# Patient Record
Sex: MALE | Race: WHITE | Employment: OTHER | URBAN - METROPOLITAN AREA
[De-identification: names, ages, dates, MRNs, and addresses within clinical notes are randomized per-mention and may not be internally consistent; named-entity substitution may affect disease eponyms.]

---

## 2017-01-02 ENCOUNTER — APPOINTMENT (OUTPATIENT)
Dept: CARDIAC REHAB | Facility: HOSPITAL | Age: 82
End: 2017-01-02
Attending: INTERNAL MEDICINE

## 2017-01-04 ENCOUNTER — APPOINTMENT (OUTPATIENT)
Dept: CARDIAC REHAB | Facility: HOSPITAL | Age: 82
End: 2017-01-04
Attending: INTERNAL MEDICINE

## 2017-01-06 ENCOUNTER — APPOINTMENT (OUTPATIENT)
Dept: CARDIAC REHAB | Facility: HOSPITAL | Age: 82
End: 2017-01-06
Attending: INTERNAL MEDICINE

## 2017-01-09 ENCOUNTER — APPOINTMENT (OUTPATIENT)
Dept: CARDIAC REHAB | Facility: HOSPITAL | Age: 82
End: 2017-01-09
Attending: INTERNAL MEDICINE

## 2017-01-11 ENCOUNTER — APPOINTMENT (OUTPATIENT)
Dept: CARDIAC REHAB | Facility: HOSPITAL | Age: 82
End: 2017-01-11
Attending: INTERNAL MEDICINE

## 2017-01-13 ENCOUNTER — APPOINTMENT (OUTPATIENT)
Dept: CARDIAC REHAB | Facility: HOSPITAL | Age: 82
End: 2017-01-13
Attending: INTERNAL MEDICINE

## 2017-01-16 ENCOUNTER — APPOINTMENT (OUTPATIENT)
Dept: CARDIAC REHAB | Facility: HOSPITAL | Age: 82
End: 2017-01-16
Attending: INTERNAL MEDICINE

## 2017-01-18 ENCOUNTER — APPOINTMENT (OUTPATIENT)
Dept: CARDIAC REHAB | Facility: HOSPITAL | Age: 82
End: 2017-01-18
Attending: INTERNAL MEDICINE

## 2017-01-20 ENCOUNTER — APPOINTMENT (OUTPATIENT)
Dept: CARDIAC REHAB | Facility: HOSPITAL | Age: 82
End: 2017-01-20
Attending: INTERNAL MEDICINE

## 2017-01-23 ENCOUNTER — APPOINTMENT (OUTPATIENT)
Dept: CARDIAC REHAB | Facility: HOSPITAL | Age: 82
End: 2017-01-23
Attending: INTERNAL MEDICINE

## 2017-01-25 ENCOUNTER — APPOINTMENT (OUTPATIENT)
Dept: CARDIAC REHAB | Facility: HOSPITAL | Age: 82
End: 2017-01-25
Attending: INTERNAL MEDICINE

## 2017-01-26 ENCOUNTER — PRIOR ORIGINAL RECORDS (OUTPATIENT)
Dept: OTHER | Age: 82
End: 2017-01-26

## 2017-01-27 ENCOUNTER — PRIOR ORIGINAL RECORDS (OUTPATIENT)
Dept: OTHER | Age: 82
End: 2017-01-27

## 2017-02-16 ENCOUNTER — MYAURORA ACCOUNT LINK (OUTPATIENT)
Dept: OTHER | Age: 82
End: 2017-02-16

## 2017-02-16 ENCOUNTER — PRIOR ORIGINAL RECORDS (OUTPATIENT)
Dept: OTHER | Age: 82
End: 2017-02-16

## 2017-05-25 ENCOUNTER — PRIOR ORIGINAL RECORDS (OUTPATIENT)
Dept: OTHER | Age: 82
End: 2017-05-25

## 2017-06-07 ENCOUNTER — LAB ENCOUNTER (OUTPATIENT)
Dept: LAB | Age: 82
End: 2017-06-07
Attending: FAMILY MEDICINE
Payer: MEDICARE

## 2017-06-07 ENCOUNTER — PRIOR ORIGINAL RECORDS (OUTPATIENT)
Dept: OTHER | Age: 82
End: 2017-06-07

## 2017-06-07 DIAGNOSIS — I25.10 CORONARY ATHEROSCLEROSIS OF NATIVE CORONARY ARTERY: ICD-10-CM

## 2017-06-07 DIAGNOSIS — I10 ESSENTIAL HYPERTENSION, MALIGNANT: ICD-10-CM

## 2017-06-07 DIAGNOSIS — E78.2 MIXED HYPERLIPIDEMIA: Primary | ICD-10-CM

## 2017-06-07 DIAGNOSIS — R73.01 IMPAIRED FASTING GLUCOSE: ICD-10-CM

## 2017-06-07 PROCEDURE — 36415 COLL VENOUS BLD VENIPUNCTURE: CPT

## 2017-06-07 PROCEDURE — 82043 UR ALBUMIN QUANTITATIVE: CPT

## 2017-06-07 PROCEDURE — 83036 HEMOGLOBIN GLYCOSYLATED A1C: CPT

## 2017-06-07 PROCEDURE — 80053 COMPREHEN METABOLIC PANEL: CPT

## 2017-06-07 PROCEDURE — 82570 ASSAY OF URINE CREATININE: CPT

## 2017-06-07 PROCEDURE — 80061 LIPID PANEL: CPT

## 2017-06-15 ENCOUNTER — MYAURORA ACCOUNT LINK (OUTPATIENT)
Dept: OTHER | Age: 82
End: 2017-06-15

## 2017-06-15 ENCOUNTER — PRIOR ORIGINAL RECORDS (OUTPATIENT)
Dept: OTHER | Age: 82
End: 2017-06-15

## 2017-06-15 LAB
ALT (SGPT): 24 U/L
AST (SGOT): 26 U/L
CHOLESTEROL, TOTAL: 110 MG/DL
HDL CHOLESTEROL: 48 MG/DL
LDL CHOLESTEROL: 53 MG/DL
TRIGLYCERIDES: 47 MG/DL

## 2017-06-20 LAB
ALBUMIN: 3.5 G/DL
ALKALINE PHOSPHATATE(ALK PHOS): 57 IU/L
BILIRUBIN TOTAL: 0.4 MG/DL
BUN: 26 MG/DL
CALCIUM: 9.2 MG/DL
CHLORIDE: 109 MEQ/L
CREATININE, SERUM: 0.87 MG/DL
GLUCOSE: 120 MG/DL
POTASSIUM, SERUM: 4.6 MEQ/L
PROTEIN, TOTAL: 6.9 G/DL
SGOT (AST): 26 IU/L
SGPT (ALT): 24 IU/L
SODIUM: 141 MEQ/L

## 2017-07-10 ENCOUNTER — PRIOR ORIGINAL RECORDS (OUTPATIENT)
Dept: OTHER | Age: 82
End: 2017-07-10

## 2017-07-12 ENCOUNTER — PRIOR ORIGINAL RECORDS (OUTPATIENT)
Dept: OTHER | Age: 82
End: 2017-07-12

## 2017-12-21 ENCOUNTER — PRIOR ORIGINAL RECORDS (OUTPATIENT)
Dept: OTHER | Age: 82
End: 2017-12-21

## 2018-07-19 ENCOUNTER — PRIOR ORIGINAL RECORDS (OUTPATIENT)
Dept: OTHER | Age: 83
End: 2018-07-19

## 2018-07-19 ENCOUNTER — MYAURORA ACCOUNT LINK (OUTPATIENT)
Dept: OTHER | Age: 83
End: 2018-07-19

## 2019-01-01 ENCOUNTER — EXTERNAL RECORD (OUTPATIENT)
Dept: HEALTH INFORMATION MANAGEMENT | Facility: OTHER | Age: 84
End: 2019-01-01

## 2019-02-28 VITALS
HEIGHT: 64 IN | SYSTOLIC BLOOD PRESSURE: 132 MMHG | DIASTOLIC BLOOD PRESSURE: 82 MMHG | BODY MASS INDEX: 26.12 KG/M2 | HEART RATE: 70 BPM | WEIGHT: 153 LBS

## 2019-02-28 VITALS
BODY MASS INDEX: 24.91 KG/M2 | DIASTOLIC BLOOD PRESSURE: 52 MMHG | WEIGHT: 155 LBS | HEART RATE: 72 BPM | SYSTOLIC BLOOD PRESSURE: 128 MMHG | HEIGHT: 66 IN

## 2019-02-28 VITALS
HEIGHT: 65 IN | DIASTOLIC BLOOD PRESSURE: 54 MMHG | HEART RATE: 78 BPM | BODY MASS INDEX: 25.66 KG/M2 | SYSTOLIC BLOOD PRESSURE: 110 MMHG | WEIGHT: 154 LBS

## 2019-03-01 VITALS
HEART RATE: 56 BPM | BODY MASS INDEX: 24.27 KG/M2 | HEIGHT: 66 IN | SYSTOLIC BLOOD PRESSURE: 146 MMHG | DIASTOLIC BLOOD PRESSURE: 82 MMHG | WEIGHT: 151 LBS

## 2019-04-09 RX ORDER — CLOPIDOGREL BISULFATE 75 MG/1
TABLET ORAL
COMMUNITY
Start: 2018-07-27 | End: 2019-07-24 | Stop reason: SDUPTHER

## 2019-04-09 RX ORDER — NITROGLYCERIN 0.4 MG/1
TABLET SUBLINGUAL
COMMUNITY
Start: 2017-02-16 | End: 2019-06-06 | Stop reason: SDUPTHER

## 2019-04-09 RX ORDER — ISOSORBIDE MONONITRATE 30 MG/1
TABLET, EXTENDED RELEASE ORAL
COMMUNITY
Start: 2017-02-16 | End: 2019-12-06 | Stop reason: ALTCHOICE

## 2019-04-09 RX ORDER — RANITIDINE 150 MG/1
TABLET ORAL
COMMUNITY
Start: 2017-12-21 | End: 2019-12-06 | Stop reason: ALTCHOICE

## 2019-04-09 RX ORDER — LISINOPRIL 20 MG/1
TABLET ORAL
COMMUNITY
Start: 2017-11-03 | End: 2019-06-06

## 2019-04-18 LAB
ABSOLUTE IMMATURE GRANULOCYTES (OFFPRE24): NORMAL
ALBUMIN SERPL-MCNC: 2.9 G/DL
ALBUMIN/GLOB SERPL: 0.7 {RATIO}
ALP SERPL-CCNC: 60 U/L
ALT SERPL-CCNC: 14 U/L
ANION GAP SERPL CALC-SCNC: NORMAL MMOL/L
AST SERPL-CCNC: 16 U/L
BASO+EOS+MONOS # BLD: NORMAL 10*3/UL
BASO+EOS+MONOS NFR BLD: NORMAL %
BASOPHILS # BLD: NORMAL 10*3/UL
BASOPHILS NFR BLD: NORMAL %
BILIRUB SERPL-MCNC: 1.3 MG/DL
BUN SERPL-MCNC: 21 MG/DL
BUN/CREAT SERPL: NORMAL
CALCIUM SERPL-MCNC: 8.6 MG/DL
CHLORIDE SERPL-SCNC: 103 MMOL/L
CO2 SERPL-SCNC: 23 MMOL/L
CREAT SERPL-MCNC: 0.77 MG/DL
DIFFERENTIAL METHOD BLD: NORMAL
EOSINOPHIL # BLD: NORMAL 10*3/UL
EOSINOPHIL NFR BLD: NORMAL %
ERYTHROCYTE [DISTWIDTH] IN BLOOD: 14 %
GLOBULIN SER-MCNC: 4.1 G/DL
GLUCOSE SERPL-MCNC: 153 MG/DL
HCT VFR BLD CALC: 46.5 %
HGB BLD-MCNC: 16 G/DL
IMMATURE GRANULOCYTES (OFFPRE25): NORMAL
INR PPP: 1.23 (ref 2–3)
LENGTH OF FAST TIME PATIENT: NORMAL H
LYMPHOCYTES # BLD: NORMAL 10*3/UL
LYMPHOCYTES NFR BLD: NORMAL %
MCH RBC QN AUTO: 30.9 PG
MCHC RBC AUTO-ENTMCNC: 34.4 G/DL
MCV RBC AUTO: 89.8 FL
MONOCYTES # BLD: NORMAL 10*3/UL
MONOCYTES NFR BLD: NORMAL %
MPV (OFFPRE2): 10.6
NEUTROPHILS # BLD: NORMAL 10*3/UL
NEUTROPHILS NFR BLD: NORMAL %
NORMAL CONTROL: ABNORMAL
NRBC BLD MANUAL-RTO: NORMAL %
PATIENT ON COUMADIN Y/N: ABNORMAL
PLAT MORPH BLD: NORMAL
PLATELET # BLD: 229 10*3/UL
POTASSIUM SERPL-SCNC: 3.9 MMOL/L
PROT SERPL-MCNC: 7 G/DL
PROTHROMBIN TIME: 12.6 S
RBC # BLD: 5.18 10*6/UL
RBC MORPH BLD: NORMAL
SODIUM SERPL-SCNC: 133 MMOL/L
WBC # BLD: 24.9 10*3/UL
WBC MORPH BLD: NORMAL

## 2019-04-20 LAB
ABSOLUTE IMMATURE GRANULOCYTES (OFFPRE24): NORMAL
ALBUMIN SERPL-MCNC: NORMAL G/DL
ALBUMIN/GLOB SERPL: NORMAL {RATIO}
ALP SERPL-CCNC: NORMAL U/L
ALT SERPL-CCNC: NORMAL U/L
ANION GAP SERPL CALC-SCNC: NORMAL MMOL/L
AST SERPL-CCNC: NORMAL U/L
BASO+EOS+MONOS # BLD: NORMAL 10*3/UL
BASO+EOS+MONOS NFR BLD: NORMAL %
BASOPHILS # BLD: NORMAL 10*3/UL
BASOPHILS NFR BLD: NORMAL %
BILIRUB SERPL-MCNC: NORMAL MG/DL
BUN SERPL-MCNC: 43 MG/DL
BUN/CREAT SERPL: NORMAL
CALCIUM SERPL-MCNC: 9 MG/DL
CHLORIDE SERPL-SCNC: 101 MMOL/L
CO2 SERPL-SCNC: 25 MMOL/L
CREAT SERPL-MCNC: 0.84 MG/DL
DIFFERENTIAL METHOD BLD: NORMAL
EOSINOPHIL # BLD: NORMAL 10*3/UL
EOSINOPHIL NFR BLD: NORMAL %
ERYTHROCYTE [DISTWIDTH] IN BLOOD: 14.2 %
GLOBULIN SER-MCNC: NORMAL G/DL
GLUCOSE SERPL-MCNC: 115 MG/DL
HCT VFR BLD CALC: 45 %
HGB BLD-MCNC: 15.5 G/DL
IMMATURE GRANULOCYTES (OFFPRE25): NORMAL
LENGTH OF FAST TIME PATIENT: NORMAL H
LYMPHOCYTES # BLD: NORMAL 10*3/UL
LYMPHOCYTES NFR BLD: NORMAL %
MAGNESIUM SERPL-MCNC: 2.6 MG/DL
MCH RBC QN AUTO: 30.7 PG
MCHC RBC AUTO-ENTMCNC: 34.4 G/DL
MCV RBC AUTO: 89.1 FL
MONOCYTES # BLD: NORMAL 10*3/UL
MONOCYTES NFR BLD: NORMAL %
MPV (OFFPRE2): 10.7
NEUTROPHILS # BLD: NORMAL 10*3/UL
NEUTROPHILS NFR BLD: NORMAL %
NRBC BLD MANUAL-RTO: NORMAL %
PLAT MORPH BLD: NORMAL
PLATELET # BLD: 221 10*3/UL
POTASSIUM SERPL-SCNC: 3.7 MMOL/L
PROT SERPL-MCNC: NORMAL G/DL
RBC # BLD: 5.05 10*6/UL
RBC MORPH BLD: NORMAL
SODIUM SERPL-SCNC: 133 MMOL/L
WBC # BLD: 21.5 10*3/UL
WBC MORPH BLD: NORMAL

## 2019-04-21 LAB
ABSOLUTE IMMATURE GRANULOCYTES (OFFPRE24): NORMAL
ALBUMIN SERPL-MCNC: NORMAL G/DL
ALBUMIN/GLOB SERPL: NORMAL {RATIO}
ALP SERPL-CCNC: NORMAL U/L
ALT SERPL-CCNC: NORMAL U/L
ANION GAP SERPL CALC-SCNC: NORMAL MMOL/L
AST SERPL-CCNC: NORMAL U/L
BASO+EOS+MONOS # BLD: NORMAL 10*3/UL
BASO+EOS+MONOS NFR BLD: NORMAL %
BASOPHILS # BLD: NORMAL 10*3/UL
BASOPHILS NFR BLD: NORMAL %
BILIRUB SERPL-MCNC: NORMAL MG/DL
BUN SERPL-MCNC: 38 MG/DL
BUN/CREAT SERPL: NORMAL
CALCIUM SERPL-MCNC: 8.1 MG/DL
CHLORIDE SERPL-SCNC: 107 MMOL/L
CO2 SERPL-SCNC: 24 MMOL/L
CREAT SERPL-MCNC: 0.67 MG/DL
DIFFERENTIAL METHOD BLD: NORMAL
EOSINOPHIL # BLD: NORMAL 10*3/UL
EOSINOPHIL NFR BLD: NORMAL %
ERYTHROCYTE [DISTWIDTH] IN BLOOD: 14.1 %
GLOBULIN SER-MCNC: NORMAL G/DL
GLUCOSE SERPL-MCNC: 110 MG/DL
HCT VFR BLD CALC: 40.1 %
HGB BLD-MCNC: 13.5 G/DL
IMMATURE GRANULOCYTES (OFFPRE25): NORMAL
LENGTH OF FAST TIME PATIENT: NORMAL H
LYMPHOCYTES # BLD: NORMAL 10*3/UL
LYMPHOCYTES NFR BLD: NORMAL %
MAGNESIUM SERPL-MCNC: 2.6 MG/DL
MCH RBC QN AUTO: 30.2 PG
MCHC RBC AUTO-ENTMCNC: 33.7 G/DL
MCV RBC AUTO: 89.7 FL
MONOCYTES # BLD: NORMAL 10*3/UL
MONOCYTES NFR BLD: NORMAL %
MPV (OFFPRE2): 10.1
NEUTROPHILS # BLD: NORMAL 10*3/UL
NEUTROPHILS NFR BLD: NORMAL %
NRBC BLD MANUAL-RTO: NORMAL %
PLAT MORPH BLD: NORMAL
PLATELET # BLD: 223 10*3/UL
POTASSIUM SERPL-SCNC: 3.7 MMOL/L
PROT SERPL-MCNC: NORMAL G/DL
RBC # BLD: 4.47 10*6/UL
RBC MORPH BLD: NORMAL
SODIUM SERPL-SCNC: 137 MMOL/L
WBC # BLD: 13.1 10*3/UL
WBC MORPH BLD: NORMAL

## 2019-04-26 ENCOUNTER — TELEPHONE (OUTPATIENT)
Dept: CARDIOLOGY | Age: 84
End: 2019-04-26

## 2019-06-06 ENCOUNTER — OFFICE VISIT (OUTPATIENT)
Dept: CARDIOLOGY | Age: 84
End: 2019-06-06

## 2019-06-06 VITALS
HEART RATE: 64 BPM | BODY MASS INDEX: 26.58 KG/M2 | DIASTOLIC BLOOD PRESSURE: 72 MMHG | SYSTOLIC BLOOD PRESSURE: 126 MMHG | HEIGHT: 63 IN | WEIGHT: 150 LBS

## 2019-06-06 DIAGNOSIS — I10 BENIGN ESSENTIAL HTN: ICD-10-CM

## 2019-06-06 DIAGNOSIS — Z95.5 S/P DRUG ELUTING CORONARY STENT PLACEMENT: ICD-10-CM

## 2019-06-06 DIAGNOSIS — I25.10 ATHEROSCLEROSIS OF NATIVE CORONARY ARTERY OF NATIVE HEART WITHOUT ANGINA PECTORIS: Primary | ICD-10-CM

## 2019-06-06 PROCEDURE — 99214 OFFICE O/P EST MOD 30 MIN: CPT | Performed by: NURSE PRACTITIONER

## 2019-06-06 RX ORDER — DOXEPIN HYDROCHLORIDE 50 MG/1
1 CAPSULE ORAL
COMMUNITY
End: 2019-06-06 | Stop reason: SDUPTHER

## 2019-06-06 RX ORDER — ROSUVASTATIN CALCIUM 10 MG/1
10 TABLET, COATED ORAL DAILY
COMMUNITY

## 2019-06-06 RX ORDER — LISINOPRIL 10 MG/1
10 TABLET ORAL 2 TIMES DAILY
COMMUNITY
End: 2019-12-06 | Stop reason: ALTCHOICE

## 2019-06-06 RX ORDER — NITROGLYCERIN 0.4 MG/1
0.4 TABLET SUBLINGUAL EVERY 5 MIN PRN
Qty: 25 TABLET | Refills: 1 | Status: SHIPPED | OUTPATIENT
Start: 2019-06-06 | End: 2020-06-26 | Stop reason: SDUPTHER

## 2019-06-06 SDOH — SOCIAL STABILITY: SOCIAL NETWORK: ARE YOU MARRIED, WIDOWED, DIVORCED, SEPARATED, NEVER MARRIED, OR LIVING WITH A PARTNER?: MARRIED

## 2019-06-06 ASSESSMENT — ENCOUNTER SYMPTOMS
ALLERGIC/IMMUNOLOGIC COMMENTS: NO NEW FOOD ALLERGIES
HEMATOCHEZIA: 0
SUSPICIOUS LESIONS: 0
BRUISES/BLEEDS EASILY: 0
HEMOPTYSIS: 0
COUGH: 0
WEIGHT LOSS: 0
FEVER: 0
CHILLS: 0
WEIGHT GAIN: 0

## 2019-06-10 ENCOUNTER — CLINICAL ABSTRACT (OUTPATIENT)
Dept: CARDIOLOGY | Age: 84
End: 2019-06-10

## 2019-07-18 ENCOUNTER — OFFICE VISIT (OUTPATIENT)
Dept: CARDIOLOGY | Age: 84
End: 2019-07-18

## 2019-07-18 VITALS
WEIGHT: 153 LBS | SYSTOLIC BLOOD PRESSURE: 126 MMHG | DIASTOLIC BLOOD PRESSURE: 56 MMHG | HEART RATE: 68 BPM | HEIGHT: 65 IN | BODY MASS INDEX: 25.49 KG/M2

## 2019-07-18 DIAGNOSIS — I10 BENIGN ESSENTIAL HTN: Primary | ICD-10-CM

## 2019-07-18 DIAGNOSIS — I25.10 ATHEROSCLEROSIS OF NATIVE CORONARY ARTERY OF NATIVE HEART WITHOUT ANGINA PECTORIS: ICD-10-CM

## 2019-07-18 DIAGNOSIS — Z95.5 S/P DRUG ELUTING CORONARY STENT PLACEMENT: ICD-10-CM

## 2019-07-18 PROCEDURE — 99214 OFFICE O/P EST MOD 30 MIN: CPT | Performed by: INTERNAL MEDICINE

## 2019-07-18 ASSESSMENT — PATIENT HEALTH QUESTIONNAIRE - PHQ9
SUM OF ALL RESPONSES TO PHQ9 QUESTIONS 1 AND 2: 0
1. LITTLE INTEREST OR PLEASURE IN DOING THINGS: NOT AT ALL
2. FEELING DOWN, DEPRESSED OR HOPELESS: NOT AT ALL
SUM OF ALL RESPONSES TO PHQ9 QUESTIONS 1 AND 2: 0

## 2019-07-24 RX ORDER — CLOPIDOGREL BISULFATE 75 MG/1
TABLET ORAL
Qty: 90 TABLET | Refills: 1 | Status: SHIPPED | OUTPATIENT
Start: 2019-07-24 | End: 2020-01-21 | Stop reason: SDUPTHER

## 2019-08-15 ENCOUNTER — TELEPHONE (OUTPATIENT)
Dept: CARDIOLOGY | Age: 84
End: 2019-08-15

## 2019-08-29 ENCOUNTER — HOSPITAL ENCOUNTER (OUTPATIENT)
Dept: CV DIAGNOSTICS | Age: 84
Discharge: HOME | End: 2019-08-29
Attending: INTERNAL MEDICINE
Payer: MEDICARE

## 2019-08-29 DIAGNOSIS — I25.10 ATHEROSCLEROSIS OF CORONARY ARTERY: ICD-10-CM

## 2019-08-29 DIAGNOSIS — Z95.5 S/P DRUG ELUTING CORONARY STENT PLACEMENT: ICD-10-CM

## 2019-08-29 PROCEDURE — 78452 HT MUSCLE IMAGE SPECT MULT: CPT | Performed by: INTERNAL MEDICINE

## 2019-08-29 PROCEDURE — 93018 CV STRESS TEST I&R ONLY: CPT | Performed by: INTERNAL MEDICINE

## 2019-08-29 PROCEDURE — 93017 CV STRESS TEST TRACING ONLY: CPT | Performed by: INTERNAL MEDICINE

## 2019-09-06 ENCOUNTER — TELEPHONE (OUTPATIENT)
Dept: CARDIOLOGY | Age: 84
End: 2019-09-06

## 2019-12-16 ENCOUNTER — APPOINTMENT (OUTPATIENT)
Dept: CARDIOLOGY | Age: 84
End: 2019-12-16

## 2019-12-16 ENCOUNTER — TELEPHONE (OUTPATIENT)
Dept: CARDIOLOGY | Age: 84
End: 2019-12-16

## 2019-12-18 ENCOUNTER — APPOINTMENT (OUTPATIENT)
Dept: CARDIOLOGY | Age: 84
End: 2019-12-18

## 2019-12-20 ENCOUNTER — OFFICE VISIT (OUTPATIENT)
Dept: CARDIOLOGY | Age: 84
End: 2019-12-20

## 2019-12-20 VITALS
HEIGHT: 65 IN | DIASTOLIC BLOOD PRESSURE: 68 MMHG | BODY MASS INDEX: 25.49 KG/M2 | SYSTOLIC BLOOD PRESSURE: 126 MMHG | HEART RATE: 62 BPM | WEIGHT: 153 LBS

## 2019-12-20 DIAGNOSIS — I10 BENIGN ESSENTIAL HTN: Primary | ICD-10-CM

## 2019-12-20 DIAGNOSIS — I25.10 ATHEROSCLEROSIS OF NATIVE CORONARY ARTERY OF NATIVE HEART WITHOUT ANGINA PECTORIS: ICD-10-CM

## 2019-12-20 DIAGNOSIS — Z95.5 S/P DRUG ELUTING CORONARY STENT PLACEMENT: ICD-10-CM

## 2019-12-20 DIAGNOSIS — I25.10 CORONARY ARTERY DISEASE INVOLVING NATIVE HEART WITHOUT ANGINA PECTORIS, UNSPECIFIED VESSEL OR LESION TYPE: ICD-10-CM

## 2019-12-20 PROCEDURE — 99214 OFFICE O/P EST MOD 30 MIN: CPT | Performed by: INTERNAL MEDICINE

## 2019-12-20 RX ORDER — ISOSORBIDE MONONITRATE 30 MG/1
30 TABLET, EXTENDED RELEASE ORAL
COMMUNITY
Start: 2019-06-12

## 2019-12-20 RX ORDER — LISINOPRIL 10 MG/1
TABLET ORAL
COMMUNITY
Start: 2019-04-23

## 2019-12-20 SDOH — SOCIAL STABILITY: SOCIAL NETWORK: ARE YOU MARRIED, WIDOWED, DIVORCED, SEPARATED, NEVER MARRIED, OR LIVING WITH A PARTNER?: MARRIED

## 2019-12-20 SDOH — HEALTH STABILITY: PHYSICAL HEALTH: ON AVERAGE, HOW MANY DAYS PER WEEK DO YOU ENGAGE IN MODERATE TO STRENUOUS EXERCISE (LIKE A BRISK WALK)?: 0 DAYS

## 2019-12-20 SDOH — HEALTH STABILITY: PHYSICAL HEALTH: ON AVERAGE, HOW MANY MINUTES DO YOU ENGAGE IN EXERCISE AT THIS LEVEL?: 0 MIN

## 2019-12-20 ASSESSMENT — PATIENT HEALTH QUESTIONNAIRE - PHQ9
1. LITTLE INTEREST OR PLEASURE IN DOING THINGS: NOT AT ALL
2. FEELING DOWN, DEPRESSED OR HOPELESS: NOT AT ALL
SUM OF ALL RESPONSES TO PHQ9 QUESTIONS 1 AND 2: 0
SUM OF ALL RESPONSES TO PHQ9 QUESTIONS 1 AND 2: 0

## 2019-12-20 ASSESSMENT — ENCOUNTER SYMPTOMS
WEIGHT LOSS: 0
BRUISES/BLEEDS EASILY: 0
FEVER: 0
HEMATOCHEZIA: 0
CHILLS: 0
COUGH: 0
SUSPICIOUS LESIONS: 0
ALLERGIC/IMMUNOLOGIC COMMENTS: NO NEW FOOD ALLERGIES
HEMOPTYSIS: 0
WEIGHT GAIN: 0

## 2020-01-01 ENCOUNTER — EXTERNAL RECORD (OUTPATIENT)
Dept: HEALTH INFORMATION MANAGEMENT | Facility: OTHER | Age: 85
End: 2020-01-01

## 2020-01-21 ENCOUNTER — TELEPHONE (OUTPATIENT)
Dept: CARDIOLOGY | Age: 85
End: 2020-01-21

## 2020-01-21 RX ORDER — CLOPIDOGREL BISULFATE 75 MG/1
75 TABLET ORAL DAILY
Qty: 90 TABLET | Refills: 3 | Status: SHIPPED | OUTPATIENT
Start: 2020-01-21 | End: 2021-01-15

## 2020-02-17 ENCOUNTER — TELEPHONE (OUTPATIENT)
Dept: CARDIOLOGY | Age: 85
End: 2020-02-17

## 2020-06-09 LAB
ALBUMIN SERPL-MCNC: 4 G/DL
ALBUMIN/GLOB SERPL: 1.4 {RATIO}
ALP SERPL-CCNC: 53 U/L
ALT SERPL-CCNC: 15 UNITS/L
AST SERPL-CCNC: 21 UNITS/L
BILIRUB SERPL-MCNC: 1 MG/DL
BUN SERPL-MCNC: 20 MG/DL
CALCIUM SERPL-MCNC: 9.4 MG/DL
CHLORIDE SERPL-SCNC: 106 MMOL/L
CHOLEST SERPL-MCNC: 130 MG/DL
CHOLEST/HDLC SERPL: 2.8 {RATIO}
CO2 SERPL-SCNC: 24 MMOL/L
CREAT SERPL-MCNC: 0.79 MG/DL
ERYTHROCYTE [DISTWIDTH] IN BLOOD: 13 %
GLOBULIN SER-MCNC: 2.8 G/DL
GLUCOSE SERPL-MCNC: 108 MG/DL
HBA1C MFR BLD: 6 %
HCT VFR BLD CALC: 43.9 %
HDLC SERPL-MCNC: 46 MG/DL
HGB BLD-MCNC: 15.5 G/DL
LDLC SERPL CALC-MCNC: 70 MG/DL
LENGTH OF FAST TIME PATIENT: YES H
LENGTH OF FAST TIME PATIENT: YES H
MCH RBC QN AUTO: 30.8 PG
MCHC RBC AUTO-ENTMCNC: 35.3 G/DL
MCV RBC AUTO: 87.1 FL
MPV (OFFPRE2): 10.7
NONHDLC SERPL-MCNC: 84 MG/DL
PLATELET # BLD: 223 K/MCL
POTASSIUM SERPL-SCNC: 4.4 MMOL/L
PROT SERPL-MCNC: 6.8 G/DL
RBC # BLD: 5.04 10*6/UL
SODIUM SERPL-SCNC: 139 MMOL/L
TRIGL SERPL-MCNC: 63 MG/DL
WBC # BLD: 7.5 K/MCL

## 2020-06-26 ENCOUNTER — V-VISIT (OUTPATIENT)
Dept: CARDIOLOGY | Age: 85
End: 2020-06-26

## 2020-06-26 VITALS
HEIGHT: 65 IN | BODY MASS INDEX: 24.99 KG/M2 | DIASTOLIC BLOOD PRESSURE: 74 MMHG | HEART RATE: 54 BPM | WEIGHT: 150 LBS | SYSTOLIC BLOOD PRESSURE: 131 MMHG

## 2020-06-26 DIAGNOSIS — I25.10 CORONARY ARTERY DISEASE INVOLVING NATIVE HEART WITHOUT ANGINA PECTORIS, UNSPECIFIED VESSEL OR LESION TYPE: ICD-10-CM

## 2020-06-26 DIAGNOSIS — I10 BENIGN ESSENTIAL HTN: Primary | ICD-10-CM

## 2020-06-26 DIAGNOSIS — I25.10 ATHEROSCLEROSIS OF NATIVE CORONARY ARTERY OF NATIVE HEART WITHOUT ANGINA PECTORIS: ICD-10-CM

## 2020-06-26 DIAGNOSIS — Z95.5 S/P DRUG ELUTING CORONARY STENT PLACEMENT: ICD-10-CM

## 2020-06-26 PROCEDURE — 99212 OFFICE O/P EST SF 10 MIN: CPT | Performed by: INTERNAL MEDICINE

## 2020-06-26 RX ORDER — NITROGLYCERIN 0.4 MG/1
0.4 TABLET SUBLINGUAL EVERY 5 MIN PRN
Qty: 25 TABLET | Refills: 1 | Status: SHIPPED | OUTPATIENT
Start: 2020-06-26

## 2020-06-26 SDOH — SOCIAL STABILITY: SOCIAL NETWORK: ARE YOU MARRIED, WIDOWED, DIVORCED, SEPARATED, NEVER MARRIED, OR LIVING WITH A PARTNER?: MARRIED

## 2020-06-26 SDOH — HEALTH STABILITY: PHYSICAL HEALTH: ON AVERAGE, HOW MANY DAYS PER WEEK DO YOU ENGAGE IN MODERATE TO STRENUOUS EXERCISE (LIKE A BRISK WALK)?: 7 DAYS

## 2020-06-26 SDOH — HEALTH STABILITY: PHYSICAL HEALTH: ON AVERAGE, HOW MANY MINUTES DO YOU ENGAGE IN EXERCISE AT THIS LEVEL?: 60 MIN

## 2020-06-26 ASSESSMENT — ENCOUNTER SYMPTOMS
ALLERGIC/IMMUNOLOGIC COMMENTS: NO NEW FOOD ALLERGIES
CHILLS: 0
HEMATOCHEZIA: 0
HEMOPTYSIS: 0
BRUISES/BLEEDS EASILY: 0
WEIGHT GAIN: 0
COUGH: 0
SUSPICIOUS LESIONS: 0
FEVER: 0
WEIGHT LOSS: 0

## 2020-06-26 ASSESSMENT — PATIENT HEALTH QUESTIONNAIRE - PHQ9
SUM OF ALL RESPONSES TO PHQ9 QUESTIONS 1 AND 2: 0
1. LITTLE INTEREST OR PLEASURE IN DOING THINGS: NOT AT ALL
2. FEELING DOWN, DEPRESSED OR HOPELESS: NOT AT ALL
CLINICAL INTERPRETATION OF PHQ2 SCORE: NO FURTHER SCREENING NEEDED
CLINICAL INTERPRETATION OF PHQ9 SCORE: NO FURTHER SCREENING NEEDED
SUM OF ALL RESPONSES TO PHQ9 QUESTIONS 1 AND 2: 0

## 2020-06-29 ENCOUNTER — TELEPHONE (OUTPATIENT)
Dept: CARDIOLOGY | Age: 85
End: 2020-06-29

## 2020-07-06 ENCOUNTER — TELEPHONE (OUTPATIENT)
Dept: CARDIOLOGY | Age: 85
End: 2020-07-06

## 2020-07-06 RX ORDER — PANTOPRAZOLE SODIUM 40 MG/1
40 TABLET, DELAYED RELEASE ORAL 2 TIMES DAILY
COMMUNITY

## 2020-07-07 ENCOUNTER — CLINICAL ABSTRACT (OUTPATIENT)
Dept: CARDIOLOGY | Age: 85
End: 2020-07-07

## 2020-07-21 ENCOUNTER — TELEPHONE (OUTPATIENT)
Dept: CARDIOLOGY | Age: 85
End: 2020-07-21

## 2021-01-15 RX ORDER — CLOPIDOGREL BISULFATE 75 MG/1
75 TABLET ORAL DAILY
Qty: 90 TABLET | Refills: 3 | Status: SHIPPED | OUTPATIENT
Start: 2021-01-15

## 2021-05-26 ENCOUNTER — TELEPHONE (OUTPATIENT)
Dept: CARDIOLOGY | Age: 86
End: 2021-05-26

## 2021-06-02 ENCOUNTER — OFFICE VISIT (OUTPATIENT)
Dept: CARDIOLOGY | Age: 86
End: 2021-06-02

## 2021-06-02 VITALS
DIASTOLIC BLOOD PRESSURE: 78 MMHG | SYSTOLIC BLOOD PRESSURE: 158 MMHG | HEART RATE: 64 BPM | HEIGHT: 65 IN | BODY MASS INDEX: 25.99 KG/M2 | WEIGHT: 156 LBS

## 2021-06-02 DIAGNOSIS — Z01.810 PRE-OPERATIVE CARDIOVASCULAR EXAMINATION: Primary | ICD-10-CM

## 2021-06-02 PROCEDURE — 99213 OFFICE O/P EST LOW 20 MIN: CPT | Performed by: NURSE PRACTITIONER

## 2021-06-02 ASSESSMENT — PATIENT HEALTH QUESTIONNAIRE - PHQ9
CLINICAL INTERPRETATION OF PHQ9 SCORE: NO FURTHER SCREENING NEEDED
SUM OF ALL RESPONSES TO PHQ9 QUESTIONS 1 AND 2: 0
2. FEELING DOWN, DEPRESSED OR HOPELESS: NOT AT ALL
SUM OF ALL RESPONSES TO PHQ9 QUESTIONS 1 AND 2: 0
1. LITTLE INTEREST OR PLEASURE IN DOING THINGS: NOT AT ALL
CLINICAL INTERPRETATION OF PHQ2 SCORE: NO FURTHER SCREENING NEEDED

## 2021-08-09 ENCOUNTER — OFFICE VISIT (OUTPATIENT)
Dept: PODIATRY | Age: 86
End: 2021-08-09

## 2021-08-09 DIAGNOSIS — B35.1 DERMATOPHYTOSIS OF NAIL: ICD-10-CM

## 2021-08-09 DIAGNOSIS — L60.3 NAIL DYSTROPHY: Primary | ICD-10-CM

## 2021-08-09 PROCEDURE — 99203 OFFICE O/P NEW LOW 30 MIN: CPT | Performed by: PODIATRIST

## 2021-10-13 ENCOUNTER — APPOINTMENT (OUTPATIENT)
Dept: URGENT CARE | Age: 86
End: 2021-10-13

## 2021-11-15 ENCOUNTER — OFFICE VISIT (OUTPATIENT)
Dept: PODIATRY | Age: 86
End: 2021-11-15

## 2021-11-15 DIAGNOSIS — L60.8 INCURVATED NAIL: ICD-10-CM

## 2021-11-15 DIAGNOSIS — M79.674 PAIN OF TOE OF RIGHT FOOT: Primary | ICD-10-CM

## 2021-11-15 PROCEDURE — 99212 OFFICE O/P EST SF 10 MIN: CPT | Performed by: PODIATRIST

## 2021-12-27 ENCOUNTER — HOSPITAL ENCOUNTER (EMERGENCY)
Age: 86
Discharge: LEFT BEFORE DISCHARGE | End: 2021-12-27
Payer: MEDICARE

## 2022-06-08 ENCOUNTER — TELEPHONE (OUTPATIENT)
Dept: PODIATRY | Age: 87
End: 2022-06-08

## 2022-06-21 ENCOUNTER — OFFICE VISIT (OUTPATIENT)
Dept: PODIATRY | Age: 87
End: 2022-06-21

## 2022-06-21 DIAGNOSIS — B35.1 DERMATOPHYTOSIS OF NAIL: ICD-10-CM

## 2022-06-21 DIAGNOSIS — M79.675 PAIN IN TOES OF BOTH FEET: ICD-10-CM

## 2022-06-21 DIAGNOSIS — L60.3 NAIL DYSTROPHY: Primary | ICD-10-CM

## 2022-06-21 DIAGNOSIS — M79.674 PAIN IN TOES OF BOTH FEET: ICD-10-CM

## 2022-06-21 DIAGNOSIS — L60.0 INGROWING NAIL: ICD-10-CM

## 2022-09-07 ENCOUNTER — OFFICE VISIT (OUTPATIENT)
Dept: PODIATRY | Age: 87
End: 2022-09-07

## 2022-09-07 DIAGNOSIS — L60.3 NAIL DYSTROPHY: ICD-10-CM

## 2022-09-07 DIAGNOSIS — Z86.718 CURRENT LONG-TERM USE OF ANTICOAGULANT MEDICATION WITH HISTORY OF DEEP VENOUS THROMBOSIS (DVT): ICD-10-CM

## 2022-09-07 DIAGNOSIS — Z79.01 CURRENT LONG-TERM USE OF ANTICOAGULANT MEDICATION WITH HISTORY OF DEEP VENOUS THROMBOSIS (DVT): ICD-10-CM

## 2022-09-07 DIAGNOSIS — B35.1 DERMATOPHYTOSIS OF NAIL: Primary | ICD-10-CM

## 2022-09-07 DIAGNOSIS — M79.674 PAIN IN TOES OF BOTH FEET: ICD-10-CM

## 2022-09-07 DIAGNOSIS — M79.675 PAIN IN TOES OF BOTH FEET: ICD-10-CM

## 2022-11-16 ENCOUNTER — OFFICE VISIT (OUTPATIENT)
Dept: PODIATRY | Age: 87
End: 2022-11-16

## 2022-11-16 DIAGNOSIS — L60.3 NAIL DYSTROPHY: ICD-10-CM

## 2022-11-16 DIAGNOSIS — M79.675 PAIN IN TOES OF BOTH FEET: ICD-10-CM

## 2022-11-16 DIAGNOSIS — B35.1 DERMATOPHYTOSIS OF NAIL: Primary | ICD-10-CM

## 2022-11-16 DIAGNOSIS — M79.674 PAIN IN TOES OF BOTH FEET: ICD-10-CM

## 2023-02-14 ENCOUNTER — OFFICE VISIT (OUTPATIENT)
Dept: PODIATRY | Age: 88
End: 2023-02-14

## 2023-02-14 DIAGNOSIS — L60.3 NAIL DYSTROPHY: ICD-10-CM

## 2023-02-14 DIAGNOSIS — E11.9 TYPE 2 DIABETES MELLITUS WITHOUT COMPLICATION, WITHOUT LONG-TERM CURRENT USE OF INSULIN (CMD): Primary | ICD-10-CM

## 2023-02-14 PROCEDURE — 99213 OFFICE O/P EST LOW 20 MIN: CPT | Performed by: PODIATRIST

## 2023-08-22 ENCOUNTER — APPOINTMENT (OUTPATIENT)
Dept: PODIATRY | Age: 88
End: 2023-08-22

## 2023-12-04 ENCOUNTER — APPOINTMENT (OUTPATIENT)
Dept: GENERAL RADIOLOGY | Facility: HOSPITAL | Age: 88
End: 2023-12-04
Attending: EMERGENCY MEDICINE
Payer: MEDICARE

## 2023-12-04 ENCOUNTER — HOSPITAL ENCOUNTER (EMERGENCY)
Facility: HOSPITAL | Age: 88
Discharge: HOME | End: 2023-12-04
Attending: EMERGENCY MEDICINE
Payer: MEDICARE

## 2023-12-04 ENCOUNTER — APPOINTMENT (OUTPATIENT)
Dept: CT IMAGING | Facility: HOSPITAL | Age: 88
End: 2023-12-04
Attending: EMERGENCY MEDICINE
Payer: MEDICARE

## 2023-12-04 VITALS
HEART RATE: 95 BPM | DIASTOLIC BLOOD PRESSURE: 97 MMHG | TEMPERATURE: 97 F | SYSTOLIC BLOOD PRESSURE: 148 MMHG | RESPIRATION RATE: 18 BRPM | OXYGEN SATURATION: 96 %

## 2023-12-04 DIAGNOSIS — S02.2XXA CLOSED NONDISPLACED FRACTURE OF NASAL BONE, INITIAL ENCOUNTER: ICD-10-CM

## 2023-12-04 DIAGNOSIS — S01.21XA LACERATION OF NOSE, INITIAL ENCOUNTER: ICD-10-CM

## 2023-12-04 DIAGNOSIS — S09.90XA INJURY OF HEAD, INITIAL ENCOUNTER: ICD-10-CM
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
## 2023-12-04 PROCEDURE — 90471 IMMUNIZATION ADMIN: CPT

## 2023-12-04 PROCEDURE — 73030 X-RAY EXAM OF SHOULDER: CPT | Performed by: EMERGENCY MEDICINE

## 2023-12-04 PROCEDURE — 99284 EMERGENCY DEPT VISIT MOD MDM: CPT

## 2023-12-04 PROCEDURE — 70450 CT HEAD/BRAIN W/O DYE: CPT | Performed by: EMERGENCY MEDICINE

## 2023-12-04 PROCEDURE — 70160 X-RAY EXAM OF NASAL BONES: CPT | Performed by: EMERGENCY MEDICINE

## 2023-12-04 PROCEDURE — 99285 EMERGENCY DEPT VISIT HI MDM: CPT

## 2023-12-04 NOTE — ED QUICK NOTES
Report received from Claudio Yan [place]. Assumed care of pt at this time. Pt resting comfortably on the stretcher, denies needs at this time. Family at bedside.

## 2023-12-04 NOTE — ED INITIAL ASSESSMENT (HPI)
Patient received via [facility] for unwitnessed fall while out walking today. Patient denies LOC, but has swelling to nose and small laceration to bridge of nose. Patient is from home with wife. Patient QA&O x 4 upon arrival to ER.

## 2024-01-09 ENCOUNTER — HOSPITAL ENCOUNTER (OUTPATIENT)
Dept: CV DIAGNOSTICS | Age: 89
Discharge: HOME | End: 2024-01-09
Attending: INTERNAL MEDICINE
Payer: MEDICARE

## 2024-01-09 DIAGNOSIS — R55 SYNCOPE AND COLLAPSE: ICD-10-CM

## 2024-01-09 DIAGNOSIS — I10 BENIGN ESSENTIAL HYPERTENSION: ICD-10-CM

## 2024-01-09 PROCEDURE — 93306 TTE W/DOPPLER COMPLETE: CPT | Performed by: INTERNAL MEDICINE

## 2024-02-28 ENCOUNTER — ORDER TRANSCRIPTION (OUTPATIENT)
Dept: PHYSICAL THERAPY | Facility: HOSPITAL | Age: 89
End: 2024-02-28

## 2024-02-28 DIAGNOSIS — M25.511 CHRONIC RIGHT SHOULDER PAIN: Primary | ICD-10-CM

## 2024-02-28 DIAGNOSIS — G89.29 CHRONIC RIGHT SHOULDER PAIN: Primary | ICD-10-CM

## 2024-02-29 ENCOUNTER — TELEPHONE (OUTPATIENT)
Dept: PHYSICAL THERAPY | Facility: HOSPITAL | Age: 89
End: 2024-02-29

## 2024-03-04 ENCOUNTER — OFFICE VISIT (OUTPATIENT)
Facility: LOCATION | Age: 89
End: 2024-03-04
Attending: FAMILY MEDICINE
Payer: MEDICARE

## 2024-03-04 ENCOUNTER — TELEPHONE (OUTPATIENT)
Dept: PHYSICAL THERAPY | Facility: HOSPITAL | Age: 89
End: 2024-03-04

## 2024-03-04 DIAGNOSIS — M25.511 CHRONIC RIGHT SHOULDER PAIN: Primary | ICD-10-CM

## 2024-03-04 DIAGNOSIS — G89.29 CHRONIC RIGHT SHOULDER PAIN: Primary | ICD-10-CM

## 2024-03-04 PROCEDURE — 97110 THERAPEUTIC EXERCISES: CPT

## 2024-03-04 PROCEDURE — 97162 PT EVAL MOD COMPLEX 30 MIN: CPT

## 2024-03-04 NOTE — PROGRESS NOTES
INITIAL EVALUATION:   Referring Physician: Dr. Mills  Diagnosis: Chronic right shoulder pain (M25.511,G89.29)     Date of Service: 3/4/2024     PATIENT SUMMARY/ASSESSMENT   Beba Mijares is a 89 year old y/o male who presents to therapy today with complaints of right shoulder pain and decreased ROM. The patient reports initial onset of symptoms several months ago with no known mechanism of injury. HE reports his shoulder pain has progressively worsened and he had x-rays which showed some arthritis and possible rotator cuff injury    Pain: Present 5/10  Best 3/10  Worst 7/10  The patient reports pain in the right shoulder/upper arm. He reports his symptoms are aggravated with using the right arm more. He reports his symptoms are eased with resting    Beba Mijares presents with impaired joint mobility, motor function, muscle performance, and range of motion associated with right shoulder pain  There are 1 personal factors and co-morbidities influencing plan of care, including chronic nature of symptoms, making treatment more difficult  There are 3 or more Body Structures/Functions, Activity Limitations and Participation Restrictions, including poor posture, decreased shoulder AROM, decreased shoulder strength  Clinical Presentation: Evolving   Beba describes prior level of function as independent with ADLs and self-care tasks without significant limitations or complaints of shoulder pain. Pt goals include decreasing his complaints of shoulder pain.  Past medical history was reviewed with Beba. Significant findings include  has a past medical history of Atherosclerosis of coronary artery (9/21/16 ), BLOOD CLOTS, Colitis, Deaf, Heart attack (HCC) (9/18/16), High blood pressure, High cholesterol, Kidney stone, Pulmonary embolism (HCC) (2013), and Stroke (HCC).  Beba would benefit from skilled Physical Therapy to address the above impairments to decrease complaints of pain and facilitate return to previous level of  function    Precautions:  None  OBJECTIVE:   Observation/Posture: The patient sits with poor posture with a slightly forward head and rounded shoulders    ROM: Seated AROM right shoulder flexion 80 degrees, Abduction 70 degrees with scapular elevation, Functional IR to L5, Functional ER to ear.   Supine PROM right shoulder flexion 120 degrees, ER 70 degrees at 90 degrees of abduction, IR 50 degrees at 90 degrees of abduction    Accessory motion: Hypomobility with GH joint AP/inferior glides    Flexibility: Moderate limitations in bilateral pectorals    Special tests: Drop Arm test painful    QuickDASH Outcome Score  Score: 38.64 % (3/4/2024  2:17 PM)       Today’s Treatment and Response: Reviewed plan of care and role of physical therapy. Performed supine PROM right shoulder as tolerated, Seated OH pulleys, supine wand shoulder flexion and provided handout for HEP and recommended the patient obtain shoulder pulleys for home    Charges: PT Eval Moderate complexity x1, TherEx x 1      Total Timed Treatment: 10 min     Total Treatment Time: 40 min     PLAN OF CARE:    Goals:  (To be met in 12 visits)  Patient to demonstrate independence and compliance with comprehensive home exercise program   Patient to demonstrate improved postural awareness, requiring no cueing during treatment session   Patient to improve QuickDash score to be better than 15%  Patient to demonstrate improved right shoulder AROM to be at least 120 degrees of flexion to facilitate performance of overhead tasks  Patient to report decreased complaints of pain at its worst to be less than or equal to 3/10 to facilitate performance of ADLs and self-care tasks    Frequency / Duration: Patient will be seen for 2 x/week or a total of 12 visits over a 90 day period. Treatment will include: Manual Therapy; Therapeutic Exercises; Neuromuscular Re-education; Therapeutic Activity; Electrical Stim; Ultrasound;     Education or treatment limitation: None  Rehab  Potential:fair      Patient/Family/Caregiver was advised of these findings, precautions, and treatment options and has agreed to actively participate in planning and for this course of care.    Thank you for your referral. Please co-sign or sign and return this letter via fax as soon as possible to [phone]. If you have any questions, please contact me at Dept: [phone]    Sincerely,  Electronically signed by therapist: Jose Juan Lee, PT    Physician's certification required: Yes  I certify the need for these services furnished under this plan of treatment and while under my care.    X___________________________________________________ Date____________________    Certification From: 3/4/2024  To:6/2/2024

## 2024-03-06 ENCOUNTER — OFFICE VISIT (OUTPATIENT)
Facility: LOCATION | Age: 89
End: 2024-03-06
Attending: FAMILY MEDICINE
Payer: MEDICARE

## 2024-03-06 PROCEDURE — 97140 MANUAL THERAPY 1/> REGIONS: CPT

## 2024-03-06 PROCEDURE — 97110 THERAPEUTIC EXERCISES: CPT

## 2024-03-06 NOTE — PROGRESS NOTES
Dx: Chronic right shoulder pain (M25.511,G89.29)           Authorized # of Visits:  12  Fall Risk: standard         Precautions: n/a             Subjective:   The patient reports he has been working on the exercises at home and ordered pulleys  Current Pain Ratin/10  Objective:   See flow sheet    Assessment:   The patient tolerated treatment well with no significantly increased complaints of pain. Continued limitations with active shoulder ROM    Plan:   Continue PT to address soft tissue and joint restrictions and provide instruction in a progressive therapeutic exercise program with manual and verbal cueing for proper form and technique    Date: 3/6/2024  Tx#:  Date:   Tx#: 3/ Date:   Tx#: 4/ Date:   Tx#: 5/ Date:   Tx#: 6/ Date:   Tx#: 7/ Date:   Tx#: 8/   TherEx (30 min) TherEx TherEx TherEx TherEx TherEx TherEx   Supine PROM right shoulder as tolerated          Seated OH pulleys shoulder flexion and horizontal abd with ER         UBE x 6 min         Supine wand shoulder flexion and chest press 2 x 10 each         Reviewed HEP         Manual Therapy (8 min)         GH joint AP/inferior glides grade 3         STM/TPR right scapular musculature         -         HEP: Seated OH pulleys, Supine wand shoulder flexion    Charges: TherEx x 2; MT x 1       Total Timed Treatment: 38 min  Total Treatment Time: 38 min

## 2024-03-12 ENCOUNTER — OFFICE VISIT (OUTPATIENT)
Facility: LOCATION | Age: 89
End: 2024-03-12
Attending: FAMILY MEDICINE
Payer: MEDICARE

## 2024-03-12 PROCEDURE — 97110 THERAPEUTIC EXERCISES: CPT

## 2024-03-12 PROCEDURE — 97140 MANUAL THERAPY 1/> REGIONS: CPT

## 2024-03-12 NOTE — PROGRESS NOTES
Dx: Chronic right shoulder pain (M25.511,G89.29)           Authorized # of Visits:  12  Fall Risk: standard         Precautions: n/a             Subjective:   The patient reports his shoulder is feeling a little bit better with less pain  Current Pain Rating: 3/10  Objective:   See flow sheet  PROM: Supine PROM shoulder flexion 140 degrees, Shoulder ER 80 degrees at 90 degrees of abduction, Shoulder IR 60 degrees at 90 degrees of abduction    Assessment:   The patient demonstrated significantly improved shoulder PROM, but continues to have difficulty with shoulder AROM into flexion    Goals:  (To be met in 12 visits)  Patient to demonstrate independence and compliance with comprehensive home exercise program   Patient to demonstrate improved postural awareness, requiring no cueing during treatment session   Patient to improve QuickDash score to be better than 15%  Patient to demonstrate improved right shoulder AROM to be at least 120 degrees of flexion to facilitate performance of overhead tasks  Patient to report decreased complaints of pain at its worst to be less than or equal to 3/10 to facilitate performance of ADLs and self-care tasks    Plan:   Continue PT to address soft tissue and joint restrictions and provide instruction in a progressive therapeutic exercise program with manual and verbal cueing for proper form and technique    Date: 3/6/2024  Tx#: 2/12 Date:   3/12/2024  Tx#: 3/12 Date:   Tx#: 4/ Date:   Tx#: 5/ Date:   Tx#: 6/ Date:   Tx#: 7/ Date:   Tx#: 8/   TherEx (30 min) TherEx (30 min) TherEx TherEx TherEx TherEx TherEx   Supine PROM right shoulder as tolerated  Supine PROM right shoulder as tolerated        Seated OH pulleys shoulder flexion and horizontal abd with ER Seated OH pulleys shoulder flexion and horizontal abd with ER        UBE x 6 min Seated AAROM shoulder flexion x 10        Supine wand shoulder flexion and chest press 2 x 10 each Supine wand shoulder flexion and chest press 2 x 10  each        Reviewed HEP UBE x 6 min        Manual Therapy (8 min) Reviewed HEP        GH joint AP/inferior glides grade 3 Manual Therapy (10 min)        STM/TPR right scapular musculature GH joint AP/inferior glides grade 3        - STM/TPR right scapular musculature        HEP: Seated OH pulleys, Supine wand shoulder flexion    Charges: TherEx x 2; MT x 1       Total Timed Treatment: 40 min  Total Treatment Time: 40 min

## 2024-03-19 ENCOUNTER — OFFICE VISIT (OUTPATIENT)
Facility: LOCATION | Age: 89
End: 2024-03-19
Attending: FAMILY MEDICINE
Payer: MEDICARE

## 2024-03-19 PROCEDURE — 97110 THERAPEUTIC EXERCISES: CPT

## 2024-03-19 PROCEDURE — 97140 MANUAL THERAPY 1/> REGIONS: CPT

## 2024-03-19 NOTE — PROGRESS NOTES
Dx: Chronic right shoulder pain (M25.511,G89.29)           Authorized # of Visits:  12  Fall Risk: standard         Precautions: n/a             Subjective:   The patient reports he feels like his shoulder is a little better with less pain  Current Pain Ratin/10  Objective:   See flow sheet  PROM: Supine PROM shoulder flexion 140 degrees, Shoulder ER 80 degrees at 90 degrees of abduction, Shoulder IR 60 degrees at 90 degrees of abduction    Assessment:   The patient tolerated treatment well, with improvements in PROM, but significant limitations and continued pain with active shoulder movements    Goals:  (To be met in 12 visits)  Patient to demonstrate independence and compliance with comprehensive home exercise program   Patient to demonstrate improved postural awareness, requiring no cueing during treatment session   Patient to improve QuickDash score to be better than 15%  Patient to demonstrate improved right shoulder AROM to be at least 120 degrees of flexion to facilitate performance of overhead tasks  Patient to report decreased complaints of pain at its worst to be less than or equal to 3/10 to facilitate performance of ADLs and self-care tasks    Plan:   Continue PT to address soft tissue and joint restrictions and provide instruction in a progressive therapeutic exercise program with manual and verbal cueing for proper form and technique    Date: 3/6/2024  Tx#:  Date:   3/12/2024  Tx#: 3/12 Date:  3/19/2024   Tx#:  Date:   Tx#: 5/ Date:   Tx#: / Date:   Tx#: 7/ Date:   Tx#: 8/   TherEx (30 min) TherEx (30 min) TherEx (30 min) TherEx TherEx TherEx TherEx   Supine PROM right shoulder as tolerated  Supine PROM right shoulder as tolerated  UBE x 6 min       Seated OH pulleys shoulder flexion and horizontal abd with ER Seated OH pulleys shoulder flexion and horizontal abd with ER Seated OH pulleys shoulder flexion and horizontal abd with ER       UBE x 6 min Seated AAROM shoulder flexion x 10  Supine PROM right shoulder as tolerated       Supine wand shoulder flexion and chest press 2 x 10 each Supine wand shoulder flexion and chest press 2 x 10 each S/L shoulder ER 2 x 10       Reviewed HEP UBE x 6 min S/L shoulder abduction 2 x 10       Manual Therapy (8 min) Reviewed HEP Seated AAROM shoulder flexion with wand x 10       GH joint AP/inferior glides grade 3 Manual Therapy (10 min) Manual Therapy (10 min)       STM/TPR right scapular musculature GH joint AP/inferior glides grade 3 GH joint AP/inferior glides grade 3       - STM/TPR right scapular musculature STM/TPR right UT, LS and infraspinatus       HEP: Seated OH pulleys, Supine wand shoulder flexion    Charges: TherEx x 2; MT x 1       Total Timed Treatment: 40 min  Total Treatment Time: 40 min

## 2024-03-26 ENCOUNTER — APPOINTMENT (OUTPATIENT)
Facility: LOCATION | Age: 89
End: 2024-03-26
Attending: FAMILY MEDICINE
Payer: MEDICARE

## 2024-03-26 ENCOUNTER — TELEPHONE (OUTPATIENT)
Dept: PHYSICAL THERAPY | Facility: HOSPITAL | Age: 89
End: 2024-03-26

## 2024-03-26 NOTE — PROGRESS NOTES
Dx: Chronic right shoulder pain (M25.511,G89.29)           Authorized # of Visits:  12  Fall Risk: standard         Precautions: n/a             Subjective:   ***  Current Pain Ratin/10    Objective:   See flow sheet      Assessment:   ***    The patient tolerated treatment well, with improvements in PROM, but significant limitations and continued pain with active shoulder movements    Goals:  (To be met in 12 visits)  Patient to demonstrate independence and compliance with comprehensive home exercise program   Patient to demonstrate improved postural awareness, requiring no cueing during treatment session   Patient to improve QuickDash score to be better than 15%  Patient to demonstrate improved right shoulder AROM to be at least 120 degrees of flexion to facilitate performance of overhead tasks  Patient to report decreased complaints of pain at its worst to be less than or equal to 3/10 to facilitate performance of ADLs and self-care tasks    Plan:   Continue PT to address soft tissue and joint restrictions and provide instruction in a progressive therapeutic exercise program with manual and verbal cueing for proper form and technique    Date: 3/6/2024  Tx#:  Date:   3/12/2024  Tx#: 3/12 Date:  3/19/2024   Tx#:  Date: 3/26/24  Tx#: 5/ Date:   Tx#: 6/ Date:   Tx#: 7/ Date:   Tx#: 8/   TherEx (30 min) TherEx (30 min) TherEx (30 min)  TherEx TherEx TherEx   Supine PROM right shoulder as tolerated  Supine PROM right shoulder as tolerated  UBE x 6 min       Seated OH pulleys shoulder flexion and horizontal abd with ER Seated OH pulleys shoulder flexion and horizontal abd with ER Seated OH pulleys shoulder flexion and horizontal abd with ER       UBE x 6 min Seated AAROM shoulder flexion x 10 Supine PROM right shoulder as tolerated       Supine wand shoulder flexion and chest press 2 x 10 each Supine wand shoulder flexion and chest press 2 x 10 each S/L shoulder ER 2 x 10       Reviewed HEP UBE x 6 min S/L  shoulder abduction 2 x 10       Manual Therapy (8 min) Reviewed HEP Seated AAROM shoulder flexion with wand x 10       GH joint AP/inferior glides grade 3 Manual Therapy (10 min) Manual Therapy (10 min)       STM/TPR right scapular musculature GH joint AP/inferior glides grade 3 GH joint AP/inferior glides grade 3       - STM/TPR right scapular musculature STM/TPR right UT, LS and infraspinatus       HEP: Seated OH pulleys, Supine wand shoulder flexion    Charges: TherEx x ***; MT x ***       Total Timed Treatment: *** min  Total Treatment Time: *** min

## 2024-03-29 ENCOUNTER — APPOINTMENT (OUTPATIENT)
Dept: CT IMAGING | Age: 89
End: 2024-03-29
Attending: EMERGENCY MEDICINE
Payer: MEDICARE

## 2024-03-29 ENCOUNTER — HOSPITAL ENCOUNTER (EMERGENCY)
Age: 89
Discharge: HOME | End: 2024-03-29
Attending: EMERGENCY MEDICINE
Payer: MEDICARE

## 2024-03-29 VITALS
DIASTOLIC BLOOD PRESSURE: 62 MMHG | HEIGHT: 66 IN | OXYGEN SATURATION: 95 % | RESPIRATION RATE: 16 BRPM | WEIGHT: 140 LBS | SYSTOLIC BLOOD PRESSURE: 113 MMHG | BODY MASS INDEX: 22.5 KG/M2 | TEMPERATURE: 99 F | HEART RATE: 79 BPM

## 2024-03-29 DIAGNOSIS — S01.01XA LACERATION OF SCALP, INITIAL ENCOUNTER: Primary | ICD-10-CM

## 2024-03-29 DIAGNOSIS — S09.90XA CLOSED HEAD INJURY, INITIAL ENCOUNTER: ICD-10-CM

## 2024-03-29 LAB
ALBUMIN SERPL-MCNC: 3.5 G/DL (ref 3.4–5)
ALBUMIN/GLOB SERPL: 0.9 {RATIO} (ref 1–2)
ALP LIVER SERPL-CCNC: 54 U/L
ALT SERPL-CCNC: 23 U/L
ANION GAP SERPL CALC-SCNC: 8 MMOL/L (ref 0–18)
AST SERPL-CCNC: 20 U/L (ref 15–37)
BASOPHILS # BLD AUTO: 0.05 X10(3) UL (ref 0–0.2)
BASOPHILS NFR BLD AUTO: 0.3 %
BILIRUB SERPL-MCNC: 0.5 MG/DL (ref 0.1–2)
BILIRUB UR QL STRIP.AUTO: NEGATIVE
BUN BLD-MCNC: 32 MG/DL (ref 9–23)
CALCIUM BLD-MCNC: 8.9 MG/DL (ref 8.5–10.1)
CHLORIDE SERPL-SCNC: 107 MMOL/L (ref 98–112)
CLARITY UR REFRACT.AUTO: CLEAR
CO2 SERPL-SCNC: 24 MMOL/L (ref 21–32)
COLOR UR AUTO: YELLOW
CREAT BLD-MCNC: 0.99 MG/DL
EGFRCR SERPLBLD CKD-EPI 2021: 69 ML/MIN/1.73M2 (ref 60–?)
EOSINOPHIL # BLD AUTO: 0.04 X10(3) UL (ref 0–0.7)
EOSINOPHIL NFR BLD AUTO: 0.2 %
ERYTHROCYTE [DISTWIDTH] IN BLOOD BY AUTOMATED COUNT: 14 %
GLOBULIN PLAS-MCNC: 3.7 G/DL (ref 2.8–4.4)
GLUCOSE BLD-MCNC: 175 MG/DL (ref 70–99)
GLUCOSE BLD-MCNC: 198 MG/DL (ref 70–99)
GLUCOSE UR STRIP.AUTO-MCNC: NEGATIVE MG/DL
HCT VFR BLD AUTO: 43.9 %
HGB BLD-MCNC: 14.9 G/DL
IMM GRANULOCYTES # BLD AUTO: 0.09 X10(3) UL (ref 0–1)
IMM GRANULOCYTES NFR BLD: 0.6 %
KETONES UR STRIP.AUTO-MCNC: 15 MG/DL
LEUKOCYTE ESTERASE UR QL STRIP.AUTO: NEGATIVE
LYMPHOCYTES # BLD AUTO: 0.7 X10(3) UL (ref 1–4)
LYMPHOCYTES NFR BLD AUTO: 4.3 %
MCH RBC QN AUTO: 30.8 PG (ref 26–34)
MCHC RBC AUTO-ENTMCNC: 33.9 G/DL (ref 31–37)
MCV RBC AUTO: 90.7 FL
MONOCYTES # BLD AUTO: 0.88 X10(3) UL (ref 0.1–1)
MONOCYTES NFR BLD AUTO: 5.4 %
NEUTROPHILS # BLD AUTO: 14.51 X10 (3) UL (ref 1.5–7.7)
NEUTROPHILS # BLD AUTO: 14.51 X10(3) UL (ref 1.5–7.7)
NEUTROPHILS NFR BLD AUTO: 89.2 %
NITRITE UR QL STRIP.AUTO: NEGATIVE
OSMOLALITY SERPL CALC.SUM OF ELEC: 300 MOSM/KG (ref 275–295)
PH UR STRIP.AUTO: 7 [PH] (ref 5–8)
PLATELET # BLD AUTO: 172 10(3)UL (ref 150–450)
POTASSIUM SERPL-SCNC: 3.8 MMOL/L (ref 3.5–5.1)
PROT SERPL-MCNC: 7.2 G/DL (ref 6.4–8.2)
RBC # BLD AUTO: 4.84 X10(6)UL
SODIUM SERPL-SCNC: 139 MMOL/L (ref 136–145)
SP GR UR STRIP.AUTO: 1.02 (ref 1–1.03)
UROBILINOGEN UR STRIP.AUTO-MCNC: 0.2 MG/DL
WBC # BLD AUTO: 16.3 X10(3) UL (ref 4–11)

## 2024-03-29 PROCEDURE — 12002 RPR S/N/AX/GEN/TRNK2.6-7.5CM: CPT

## 2024-03-29 PROCEDURE — 81001 URINALYSIS AUTO W/SCOPE: CPT | Performed by: EMERGENCY MEDICINE

## 2024-03-29 PROCEDURE — 96360 HYDRATION IV INFUSION INIT: CPT

## 2024-03-29 PROCEDURE — 72125 CT NECK SPINE W/O DYE: CPT | Performed by: EMERGENCY MEDICINE

## 2024-03-29 PROCEDURE — 82962 GLUCOSE BLOOD TEST: CPT

## 2024-03-29 PROCEDURE — 81015 MICROSCOPIC EXAM OF URINE: CPT | Performed by: EMERGENCY MEDICINE

## 2024-03-29 PROCEDURE — 70450 CT HEAD/BRAIN W/O DYE: CPT | Performed by: EMERGENCY MEDICINE

## 2024-03-29 PROCEDURE — 99285 EMERGENCY DEPT VISIT HI MDM: CPT

## 2024-03-29 PROCEDURE — 85025 COMPLETE CBC W/AUTO DIFF WBC: CPT | Performed by: EMERGENCY MEDICINE

## 2024-03-29 PROCEDURE — 93005 ELECTROCARDIOGRAM TRACING: CPT

## 2024-03-29 PROCEDURE — 93010 ELECTROCARDIOGRAM REPORT: CPT

## 2024-03-29 PROCEDURE — 80053 COMPREHEN METABOLIC PANEL: CPT | Performed by: EMERGENCY MEDICINE

## 2024-03-29 RX ORDER — OLOPATADINE HYDROCHLORIDE 1 MG/ML
2 SOLUTION/ DROPS OPHTHALMIC 2 TIMES DAILY
COMMUNITY
Start: 2022-06-20

## 2024-03-29 RX ORDER — NITROGLYCERIN 0.4 MG/1
TABLET SUBLINGUAL
COMMUNITY
Start: 2021-08-03

## 2024-03-29 NOTE — ED PROVIDER NOTES
Patient Seen in: [facility]      History     Chief Complaint   Patient presents with    Fall     Stated Complaint: UNWITNESSED FALL ON DRIVEWAY. DRIVING NURSE NOTICED HIM LAYING ON THE GROUND 30*    Subjective:   HPI    89-year-old male here for fall patient had unwitnessed fall in his driveway neighbor found him no report that he was unconscious but has not quite clear why he fell whether he lost consciousness or fainted or how long he was down for.  Patient has some pain and swelling in the back of his head but denies any neck pain or extremity injury.    Objective:   Past Medical History:   Diagnosis Date    Atherosclerosis of coronary artery 9/21/16     Stents 3    BLOOD CLOTS     Colitis     Deaf     Heart attack (HCC) 9/18/16    NSTEMI    High blood pressure     High cholesterol     Kidney stone     Pulmonary embolism (HCC) 2013    DVT & PE POst  Fractured Metatarsil    Stroke (HCC)               Past Surgical History:   Procedure Laterality Date    ANGIOGRAM      ANGIOPLASTY (CORONARY)  9/21/16      Stents LAD, CIRC, RCA    CATH CARTOTID STENT      CATH DRUG ELUTING STENT      PROSTATE BIOPSIES                  Social History     Socioeconomic History    Marital status:    Tobacco Use    Smoking status: Never   Substance and Sexual Activity    Alcohol use: Yes     Comment: A BEER 2X/WEEK    Drug use: No              Review of Systems    Positive for stated complaint: UNWITNESSED FALL ON DRIVEWAY. DRIVING NURSE NOTICED HIM LAYING ON THE GROUND 30*  Other systems are as noted in HPI.  Constitutional and vital signs reviewed.      All other systems reviewed and negative except as noted above.    Physical Exam     ED Triage Vitals   BP 03/29/24 1138 (!) 194/76   Pulse 03/29/24 1138 108   Resp 03/29/24 1138 18   Temp 03/29/24 1138 98.9 °F (37.2 °C)   Temp src 03/29/24 1716 Temporal   SpO2 03/29/24 1138 93 %   O2 Device 03/29/24 1138 None (Room air)       Current:/62    Pulse 79   Temp 98.6 °F (37 °C) (Temporal)   Resp 16   Ht 167.6 cm (5' 6\")   Wt 63.5 kg   SpO2 95%   BMI 22.60 kg/m²         Physical Exam    The patient is alert and oriented x 2 no acute distress HEENT exam pupils are equal round react to light extract muscles are intact on the occipital area there is an area of skin tear about 4 cm across and underlying this there is a laceration about 3 cm that is into the subcutaneous tissue no bony deformities noted.  The neck is nontender there is no JVD lungs are clear cardiovascular exam shows regular rate and rhythm without murmurs abdomen soft nontender extremities are atraumatic with full range of motion  Neuro exam the mental status is mildly confused as per dates cranial nerves II through XII are normal motor functions intact sensations intact cerebellar finger-nose is normal.    ED Course     Labs Reviewed   COMP METABOLIC PANEL (14) - Abnormal; Notable for the following components:       Result Value    Glucose 198 (*)     BUN 32 (*)     Calculated Osmolality 300 (*)     A/G Ratio 0.9 (*)     All other components within normal limits   URINALYSIS WITH CULTURE REFLEX - Abnormal; Notable for the following components:    Ketones Urine 15 (*)     Blood Urine Moderate (*)     Protein Urine 30 mg/dL (*)     All other components within normal limits   UA MICROSCOPIC ONLY, URINE - Abnormal; Notable for the following components:    RBC Urine 3-5 (*)     All other components within normal limits   POCT GLUCOSE - Abnormal; Notable for the following components:    POC Glucose 175 (*)     All other components within normal limits   CBC W/ DIFFERENTIAL - Abnormal; Notable for the following components:    WBC 16.3 (*)     Neutrophil Absolute Prelim 14.51 (*)     Neutrophil Absolute 14.51 (*)     Lymphocyte Absolute 0.70 (*)     All other components within normal limits   CBC WITH DIFFERENTIAL WITH PLATELET    Narrative:     The following orders were created for panel order  CBC With Differential With Platelet.  Procedure                               Abnormality         Status                     ---------                               -----------         ------                     CBC W/ DIFFERENTIAL[[id number]]          Abnormal            Final result                 Please view results for these tests on the individual orders.   RAINBOW DRAW LAVENDER   RAINBOW DRAW LIGHT GREEN   RAINBOW DRAW BLUE     EKG    Rate, intervals and axes as noted on EKG Report.  Rate: 102  Rhythm: Sinus Rhythm  Reading: Sinus tachycardia occasional PVCs left axis deviation inferior infarct age undetermined this is an abnormal EKG         BUN 32 white blood Cygan 16.3        CT SPINE CERVICAL (CPT=72125)    Result Date: 3/29/2024  CONCLUSION:  No acute cervical spine fracture.  Diffuse spondylosis    LOCATION:  [id number]   Dictated by (CST): Nicholas Lemus MD on 3/29/2024 at 1:06 PM     Finalized by (CST): Nicholas Lemus MD on 3/29/2024 at 1:08 PM       CT BRAIN OR HEAD ([id number])    Result Date: 3/29/2024  CONCLUSION: 1. No acute intracranial findings 2. Cerebral atrophy with chronic microvascular ischemic changes.  Encephalomalacia involving the left frontal and parietal lobes, stable from prior.    LOCATION:  [id number]   Dictated by (CST): Nicholas Lemus MD on 3/29/2024 at 1:04 PM     Finalized by (CST): Nicholas Lemus MD on 3/29/2024 at 1:06 PM      Images independent reviewed there is no intracranial hemorrhage there is diffuse spondylosis but no fracture.         MDM      Initial differential diagnosis includes scalp laceration contusion concussion intracranial hemorrhage skull fracture cervical spine injury cervical spine fracture contusion extremity injury syncope.    Patient's wound was reexamined there was skin avulsion which was removed and then there was a stellate laceration total length about 6 cm a total of 8 staples were placed after local anesthesia with 1% lidocaine without epinephrine the wound  was scrubbed and irrigated prior to stapling.  A dressing was applied.  The patient was given a liter of IV fluids.  I discussed with the son the possibility of a syncopal episode but the patient and the son think this was more of a mechanical fall and offered admission but declines this.    UA was negative                           Medical Decision Making      Disposition and Plan     Clinical Impression:  1. Laceration of scalp, initial encounter    2. Closed head injury, initial encounter         Disposition:  Discharge  3/29/2024  6:00 pm    Follow-up:  Ashish Mills  2020 [address] [address]  [phone]    Follow up            Medications Prescribed:  Discharge Medication List as of 3/29/2024  6:02 PM

## 2024-03-29 NOTE — DISCHARGE INSTRUCTIONS
Follow-up with your primary care doctor or return to the ER in 10 days to 2 weeks for staple removal.  Clean wound daily with soap and water apply Neosporin ointment cover with a bandage.

## 2024-03-29 NOTE — ED QUICK NOTES
Pt ambulated to the bathroom with steady gait. Pt tried to void in the urinal bottle but was unable to collect his urine in it. Nurse was notified.

## 2024-03-29 NOTE — ED INITIAL ASSESSMENT (HPI)
Pt fell in driveway this AM. Fall was unwitnessed and patient does not remember falling. Unknown LOC, found by neighbor laying in driveway. Laceration and abrasion noted to back of head. Denies any pain. Pt is on blood thinners

## 2024-03-31 LAB
ATRIAL RATE: 102 BPM
P AXIS: -10 DEGREES
P-R INTERVAL: 136 MS
Q-T INTERVAL: 340 MS
QRS DURATION: 88 MS
QTC CALCULATION (BEZET): 443 MS
R AXIS: -41 DEGREES
T AXIS: 16 DEGREES
VENTRICULAR RATE: 102 BPM

## 2024-04-02 ENCOUNTER — APPOINTMENT (OUTPATIENT)
Facility: LOCATION | Age: 89
End: 2024-04-02
Attending: FAMILY MEDICINE
Payer: MEDICARE

## 2024-04-02 ENCOUNTER — TELEPHONE (OUTPATIENT)
Dept: PHYSICAL THERAPY | Facility: HOSPITAL | Age: 89
End: 2024-04-02

## 2024-04-09 ENCOUNTER — HOSPITAL ENCOUNTER (OUTPATIENT)
Age: 89
Discharge: HOME | End: 2024-04-09
Payer: MEDICARE

## 2024-04-09 VITALS
RESPIRATION RATE: 18 BRPM | SYSTOLIC BLOOD PRESSURE: 179 MMHG | TEMPERATURE: 98 F | OXYGEN SATURATION: 97 % | DIASTOLIC BLOOD PRESSURE: 69 MMHG | HEART RATE: 64 BPM

## 2024-04-09 DIAGNOSIS — Z48.02 ENCOUNTER FOR STAPLE REMOVAL: Primary | ICD-10-CM

## 2024-04-09 PROCEDURE — 99024 POSTOP FOLLOW-UP VISIT: CPT | Performed by: NURSE PRACTITIONER

## 2024-04-09 NOTE — ED PROVIDER NOTES
Patient Seen in: [facility]      History     Chief Complaint   Patient presents with    Sut Stap RingRemoval     Stated Complaint: staple removal    Subjective:   HPI    Patient is a 89-year-old male who is here today 2 weeks after having 8 staples placed in the posterior scalp for 6 staple removal.  There is no sign of infection wound is well-healed    Objective:   Past Medical History:   Diagnosis Date    Atherosclerosis of coronary artery 9/21/16     Stents 3    BLOOD CLOTS     Colitis     Deaf     Heart attack (HCC) 9/18/16    NSTEMI    High blood pressure     High cholesterol     Kidney stone     Pulmonary embolism (HCC) 2013    DVT & PE POst  Fractured Metatarsil    Stroke ([facility])               Past Surgical History:   Procedure Laterality Date    ANGIOGRAM      ANGIOPLASTY (CORONARY)  9/21/16      Stents LAD, CIRC, RCA    CATH CARTOTID STENT      CATH DRUG ELUTING STENT      PROSTATE BIOPSIES                  Social History     Socioeconomic History    Marital status:    Tobacco Use    Smoking status: Never   Substance and Sexual Activity    Alcohol use: Yes     Comment: A BEER 2X/WEEK    Drug use: No              Review of Systems    Positive for stated complaint: staple removal  Other systems are as noted in HPI.  Constitutional and vital signs reviewed.      All other systems reviewed and negative except as noted above.    Physical Exam     ED Triage Vitals [04/09/24 1612]   BP (!) 179/69   Pulse 64   Resp 18   Temp 98.1 °F (36.7 °C)   Temp src Temporal   SpO2 97 %   O2 Device None (Room air)       Current:BP (!) 179/69   Pulse 64   Temp 98.1 °F (36.7 °C) (Temporal)   Resp 18   SpO2 97%         Physical Exam    Adult physical exam:     VS: Vital signs reviewed. O2 saturation within normal limits for this patient     General: Patient is awake and alert, oriented to person, place and time. Not in acute distress.      HEENT: Head is normocephalic atraumatic. Pupils reactive bilaterally.   EOMs intact.  No facial droop or slurred speech.  No oral pallor. Mucous membranes moist.      Lungs: good inspiratory effort. +air entry bilaterally without wheezes, rhonchi, crackles.  No accessory muscle use or tachypnea.        Extremities: No edema.  Pulses 2+ extremities.   Brisk capillary refill noted.      Skin: Normal skin turgor there are 8 staples placed in the posterior scalp.  No surrounding erythema, drainage    CNS: Moves all 4 extremities.  Interacts appropriately.  No tremor.  No gait abnormality          ED Course   Labs Reviewed - No data to display  I have personally  reviewed available prior medical records for any recent pertinent discharge summaries/testing. Patient/family updated on results and plan, a verbalized understanding and agreement with the plan.  I explained to the patient that emergent conditions may arise and to go to the ER for new, worsening or any persistent conditions. I've explained the importance of taking all medicatons as prescribed, follow up, and return precuations,  All questions answered.    Please note that this report has been produced using speech recognition software and may contain errors related to that system including, but not limited to, errors in grammar, punctuation, and spelling, as well as words and phrases that possibly may have been recognized inappropriately.  If there are any questions or concerns, contact the dictating provider for clarification.         MDM          Patient presents the emergency room for staple removal.  History and physical exam as above.  Tetanus shot is up-to-date.  Staples removed at bedside.  No evidence of wound infection or wound dehiscence.  Counseled on local wound care.  Return to ED precautions discussed with the patient and they verbalized understanding.                                   Medical Decision Making      Disposition and Plan     Clinical Impression:  1. Encounter for staple removal          Disposition:  Discharge  4/9/2024  4:19 pm    Follow-up:  Ashish Mills  2020 [address] [id number] [phone]                Medications Prescribed:  Current Discharge Medication List

## 2024-04-09 NOTE — ED INITIAL ASSESSMENT (HPI)
Patient was treated at [facility] ED on 3/29/24 for a laceration to his scalp. 8 staples placed. Here for removal.

## 2024-04-17 ENCOUNTER — ORDER TRANSCRIPTION (OUTPATIENT)
Dept: PHYSICAL THERAPY | Facility: HOSPITAL | Age: 89
End: 2024-04-17

## 2024-04-17 DIAGNOSIS — R29.6 RECURRENT FALLS: ICD-10-CM

## 2024-04-17 DIAGNOSIS — R26.81 UNSTEADY GAIT: Primary | ICD-10-CM

## 2024-04-19 ENCOUNTER — TELEPHONE (OUTPATIENT)
Dept: PHYSICAL THERAPY | Facility: HOSPITAL | Age: 89
End: 2024-04-19

## 2024-04-23 ENCOUNTER — OFFICE VISIT (OUTPATIENT)
Facility: LOCATION | Age: 89
End: 2024-04-23
Attending: FAMILY MEDICINE
Payer: MEDICARE

## 2024-04-23 DIAGNOSIS — R26.81 UNSTEADY GAIT: Primary | ICD-10-CM

## 2024-04-23 DIAGNOSIS — R29.6 RECURRENT FALLS: ICD-10-CM

## 2024-04-23 PROCEDURE — 97162 PT EVAL MOD COMPLEX 30 MIN: CPT

## 2024-04-23 PROCEDURE — 97110 THERAPEUTIC EXERCISES: CPT

## 2024-04-23 NOTE — PROGRESS NOTES
INITIAL EVALUATION   Referring Physician: Dr. Mills  Diagnosis: Unsteady gait (R26.81)  Recurrent falls (R29.6)     Date of Service: 4/23/2024     PATIENT SUMMARY/ASSESSMENT   Beba Mijares is a 89 year old y/o male who presents to therapy today with complaints of recent falls and poor balance. He reports he had a fall last year in November/December and had another fall approximately 1 month ago. He also has chronic shoulder pain and was being treated in physical therapy for his shoulder pain last month. Treatment was placed on hold as the patient had the fall noted above at home   There are 1 personal factors and co-morbidities influencing plan of care, including chronic nature of balance difficulties, making treatment more difficult  There are 3 or more Body Structures/Functions, Activity Limitations and Participation Restrictions, including poor balance, decreased LE flexibility, difficulty walking long distances  Clinical Presentation: Evolving   Beba describes prior level of function as independent with ambulation without an assistive device and independent with ADLs and self-care tasks. Pt goals include improving his balance.  Past medical history was reviewed with Beba. Significant findings include  has a past medical history of Atherosclerosis of coronary artery (9/21/16 ), BLOOD CLOTS, Colitis, Deaf, Heart attack (HCC) (9/18/16), High blood pressure, High cholesterol, Kidney stone, Pulmonary embolism ([facility]) (2013), and Stroke ([facility]).  Beba would benefit from skilled Physical Therapy to address the above impairments to decrease complaints of shoulder pain and improve balance/gait      Positive History of Falls: yes   FES Score  Score: 73.44 % (4/23/2024  9:55 AM)    Score and level of concern: 47 - high concern (4/23/2024  9:55 AM)  Fall Risk: yes      Precautions:  Fall Risk    OBJECTIVE:   Physical Exam:  Posture/Observation: Slightly forward flexed posture in standing    LE Strength: Hip flexors 4+/5,  Quadriceps 5/5, Hamstrings 5/5, Ankle dorsiflexors 4/5, Ankle invertors 5/5, Ankle evertors 4/5  LE Flexibility: Moderate limitations in bilateral hamstrings               Postural Control:  4-Stage Balance Test:   - Feet together: >30 sec with increased sway   - Modified Tandem:  >15 sec with increased sway    - Tandem Stance: Unable sec Fall Risk: yes   - SLS: R 1 sec, L 1 sec Fall Risk: yes  [Full tandem stance <10 sec indicates increased risk of falling]  Age appropriate norms for SLS: 60-68 y/o mean = 27.0 sec      70-78 y/o mean = 17.2 sec      80-98 y/o mean = 8.5 sec     ROM: Seated AROM right shoulder flexion 80 degrees, Abduction 70 degrees with scapular elevation, Functional IR to L5, Functional ER to ear.     Functional Mobility:  30 sec sit<>stand: 11   Fall Risk: no  [Below average score indicates high risk for falls; norms by age > or = to...   60-63 y/o Men: 14, Women: 12   65-68 y/o Men: 12, Women: 11   70-73 y/o Men: 12, Women: 10   75-78 y/o Men: 11, Women: 10   80-85 y/o Men: 10, Women: 9   85-90 y/o Men: 8, Women: 8   90-95 y/o Men 7, Women: 4]    Gait The patient ambulates with a slightly wide ROBERT, decreased step length and decreased heel strike and push off     TUG (AD, time): 18 sec    Fall Risk: yes  [Performance exceeding the upper limit of confidence intervals are considered increased risk for falls; Lester, 2006...   60-68 y/o mean 8.1s (7.1-9.0s)   70-78 y/o mean 9.2s (8.2-10.2s)   80-98 y/o mean 11.3s (10.0-12.7s)]    4 Item Dynamic Gait Index Score: 6/12 Fall Risk: yes  [Scores of 10 or less indicate increased risk for falls]  Gait level surface: 1  Grading: Art the lowest category that applies.  (3)  Normal: Walks 20’, no assistive devices, good speed, no evidence of imbalance, normal gait pattern.  (2)  Mild Impairment: Walks 20’, uses assistive devices, slower speed, mild gait deviations.  (1)  Moderate Impairment: Walks 20’, slow speed, abnormal gait pattern, evidence of  imbalance.  (0)  Severe Impairment: Cannot walk 20’ without assistance, severe gait deviations or imbalance.     Change in gait speed: 1  Grading: Art the lowest category that applies.  (3)  Normal: Able to smoothly change walking speed without loss of balance or gait deviation. Shows a significant difference in walking speed between normal, fast and slow speeds.   (2)  Mild Impairment: Is able to change speed but demonstrates mild gait deviations, or no gait deviations but unable to achieve a significant change in velocity, or uses an assistive device.   (1)  Moderate Impairment: Makes only minor adjustments to walking speed, or accomplishes a change in speed with significant gait deviations, or changes speed but has significant gait deviations, or changes speed but loses balance but is able to recover and continue walking.  (0)  Severe Impairment: Cannot change speeds, or loses balance and has to reach for wall or be caught.    Gait with horizontal head turns: 2  (3)  Normal: Performs head turns smoothly with no change in gait.  (2)  Mild Impairment: Preforms head turns smoothly with slight change in gait velocity, i.e., minor disruption to smooth gait path or uses walking aid.  (1) Moderate Impairment: Preforms head turns with moderate change in gait velocity, slows down, staggers but recovers, can continue to walk.  (0)  Severe Impairment: Preform task with severe disruption of gait, i.e., staggers outside 15” path, loses balance, stops, reaches for wall.    Gait with vertical head turns: 2  Grading: Art the lowest category that applies.  (3) Normal: Preforms head turns smoothly with no change in gait.  (2) Mild Impairment: Preforms head turns smoothly with slight change in gait velocity, i.e., minor disruption to smooth gait path or uses walking aid.  (1) Moderate Impairment: Preforms head turns with moderate change in gait velocity, slows down, staggers but recovers, can continue to walk.  (0) Severe  Impairment: Preforms task with severe disruption of gait, i.e., staggers outside 15” path, loses balance, stops, reaches for wall.    Today's treatment: Reviewed plan of care and role of physical therapy.  : PT Eval Moderate complexity x1, TherEx x 1      Total Timed Treatment: 10 min     Total Treatment Time: 35 min     PLAN OF CARE:    Goals: ( To be met in 12 visits)  Pt will demonstrate improved SLS to >5 seconds JAYLYN to promote safety and decrease risk of falls on uneven surfaces such as grass   Pt will perform TUG in <12 seconds with least restrictive AD, demonstrating improved gait speed for improved participation in ADL such as community ambulation  Pt will perform 4-Item DGI with score of 10/12 or greater with least restrictive AD to demonstrate ability to ambulate safely in crowded and busy environments   Pt will demonstrate improved right shoulder flexion AROM to be greater than 120 degrees   Pt will be independent and compliant with comprehensive HEP to maintain progress achieved in PT    Frequency / Duration: Patient will be seen for 2 x/week or a total of 12 visits over a 90 day period. Treatment will include: Neuromuscular Re-education; Gait Training; Therapeutic Exercises; Manual Therapy;     Education or treatment limitation: None  Rehab Potential:good    Patient/Family/Caregiver was advised of these findings, precautions, and treatment options and has agreed to actively participate in planning and for this course of care.    Thank you for your referral. Please co-sign or sign and return this letter via fax as soon as possible to [phone]. If you have any questions, please contact me at Dept: [phone]    Sincerely,  Electronically signed by therapist: Jose Juan Lee, PT    Physician's certification required: Yes  I certify the need for these services furnished under this plan of treatment and while under my care.    X___________________________________________________  Date____________________    Certification From: 4/23/2024  To:7/22/2024

## 2024-04-25 ENCOUNTER — OFFICE VISIT (OUTPATIENT)
Facility: LOCATION | Age: 89
End: 2024-04-25
Attending: FAMILY MEDICINE
Payer: MEDICARE

## 2024-04-25 PROCEDURE — 97110 THERAPEUTIC EXERCISES: CPT

## 2024-04-25 NOTE — PROGRESS NOTES
Dx:  Unsteady gait (R26.81)  Recurrent falls (R29.6)           Authorized # of Visits:  12  Fall Risk: standard         Precautions: n/a             Subjective:   The patient reports no new complaints   Current Pain Ratin/10  Objective:   See flow sheet    Assessment:   The patient tolerated treatment well, but requires frequent cueing with exercises for positioning    Plan:   Progress strength, balance and stability exercises as tolerated    Date: 2024  Tx#:  Date:   Tx#: 3/ Date:   Tx#: 4/ Date:   Tx#: 5/ Date:   Tx#: 6/ Date:   Tx#: 7/ Date:   Tx#: 8/   TherEx (12 min) TherEx TherEx TherEx TherEx TherEx TherEx   Nustep L4 x 10 min         Seated OH pulleys shoulder flexion         Slantboard gastroc stretch 3 x 30 sec         Stance on Airex with head/trunk rotation SBA         Gait with verbal cueing for increased step length and gait speed         Sidestepping along bar with 1 hand assist         Forward/retro walking with 1 hand assist along bar         Supine piriformis stretch 2 x 30 sec         Reviewed HEP         HEP: Sit to stand, Standing gastroc stretch   Charges: TherEx x 3       Total Timed Treatment: 40 min  Total Treatment Time: 40 min

## 2024-04-30 ENCOUNTER — OFFICE VISIT (OUTPATIENT)
Facility: LOCATION | Age: 89
End: 2024-04-30
Attending: FAMILY MEDICINE
Payer: MEDICARE

## 2024-04-30 PROCEDURE — 97110 THERAPEUTIC EXERCISES: CPT

## 2024-04-30 NOTE — PROGRESS NOTES
Dx:  Unsteady gait (R26.81)  Recurrent falls (R29.6)           Authorized # of Visits:  12  Fall Risk: standard         Precautions: n/a             Subjective:   The patient brought his walking stick to therapy today   Current Pain Ratin/10  Objective:   See flow sheet    Assessment:   The patient tolerated exercises well without any significant loss of balance, cueing needed for positioning and form with exercises    Plan:   Progress strength, balance and stability exercises as tolerated    Date: 2024  Tx#:  Date:   2024  Tx#: 3/12 Date:   Tx#: 4/ Date:   Tx#: 5/ Date:   Tx#: 6/ Date:   Tx#: 7/ Date:   Tx#: 8/   TherEx (40 min) TherEx (40 min) TherEx TherEx TherEx TherEx TherEx   Nustep L4 x 10 min Nustep L5 x 10 min        Seated OH pulleys shoulder flexion Seated OH pulleys shoulder flexion         Slantboard gastroc stretch 3 x 30 sec Slantboard gastroc stretch 3 x 30 sec        Stance on Airex with head/trunk rotation SBA Stance on Airex with head/trunk rotation SBA        Gait with verbal cueing for increased step length and gait speed Sidestepping with 1 UE assist at mirror        Sidestepping along bar with 1 hand assist Forward/retro walking with 1 hand assist along bar        Forward/retro walking with 1 hand assist along bar Seated marching with green band 2 x 15        Supine piriformis stretch 2 x 30 sec Seated clams 2 x 15        Reviewed HEP Reviewed HEP        HEP: Sit to stand, Standing gastroc stretch     Charges: TherEx x 3       Total Timed Treatment: 40 min  Total Treatment Time: 40 min

## 2024-05-02 ENCOUNTER — OFFICE VISIT (OUTPATIENT)
Facility: LOCATION | Age: 89
End: 2024-05-02
Attending: FAMILY MEDICINE
Payer: MEDICARE

## 2024-05-02 PROCEDURE — 97110 THERAPEUTIC EXERCISES: CPT

## 2024-05-02 NOTE — PROGRESS NOTES
Dx:  Unsteady gait (R26.81)  Recurrent falls (R29.6)           Authorized # of Visits:  12  Fall Risk: standard         Precautions: n/a             Subjective:   The patient reports his daughter drove him today  Current Pain Rating: 3/10  Objective:   See flow sheet     Assessment:   The patient tolerated treatment well, requires frequent cueing with activities. Ambulates with a wide ROBERT    Plan:   Progress strength, balance and stability exercises as tolerated    Date: 4/25/2024  Tx#: 2/12 Date:   4/30/2024  Tx#: 3/12 Date:   5/2/2024  Tx#: 4/12 Date:   Tx#: 5/ Date:   Tx#: 6/ Date:   Tx#: 7/ Date:   Tx#: 8/   TherEx (40 min) TherEx (40 min) TherEx (40 min) TherEx TherEx TherEx TherEx   Nustep L4 x 10 min Nustep L5 x 10 min Nustep L5 x 10 min       Seated OH pulleys shoulder flexion Seated OH pulleys shoulder flexion  Forward tap up to cones       Slantboard gastroc stretch 3 x 30 sec Slantboard gastroc stretch 3 x 30 sec Sidestepping with 1 UE assist on bar       Stance on Airex with head/trunk rotation SBA Stance on Airex with head/trunk rotation SBA Seated marching green band 2 x 15       Gait with verbal cueing for increased step length and gait speed Sidestepping with 1 UE assist at mirror Seated clams green band 2 x 15       Sidestepping along bar with 1 hand assist Forward/retro walking with 1 hand assist along bar Standing marching 2 x 10       Forward/retro walking with 1 hand assist along bar Seated marching with green band 2 x 15 Seated HS stretch 3 x 30 sec       Supine piriformis stretch 2 x 30 sec Seated clams 2 x 15 Slantboard gastroc stretch 3 x 30 sec       Reviewed HEP Reviewed HEP Gait with verbal cueing for increased gait speed         Seated OH pulleys shoulder flexion       HEP: Sit to stand, Standing gastroc stretch     Charges: TherEx x 3       Total Timed Treatment: 40 min  Total Treatment Time: 40 min

## 2024-05-06 ENCOUNTER — OFFICE VISIT (OUTPATIENT)
Facility: LOCATION | Age: 89
End: 2024-05-06
Attending: FAMILY MEDICINE
Payer: MEDICARE

## 2024-05-06 PROCEDURE — 97110 THERAPEUTIC EXERCISES: CPT

## 2024-05-06 NOTE — PROGRESS NOTES
Dx:  Unsteady gait (R26.81)  Recurrent falls (R29.6)           Authorized # of Visits:  12  Fall Risk: standard         Precautions: n/a             Subjective:   The patient reports a little pain in his shoulder today.  Current Pain Ratin/10  Objective:   See flow sheet     Assessment:   The patient tolerated treatment well, but he continues to require frequent cueing with exercises    Plan:   Progress strength, balance and stability exercises as tolerated    Date: 2024  Tx#:  Date:   2024  Tx#: 3/12 Date:   2024  Tx#:  Date:   2024  Tx#:  Date:   Tx#: / Date:   Tx#:  Date:   Tx#: 8/   TherEx (40 min) TherEx (40 min) TherEx (40 min) TherEx (40 min) TherEx TherEx TherEx   Nustep L4 x 10 min Nustep L5 x 10 min Nustep L5 x 10 min Nustep L5 x 10 min      Seated OH pulleys shoulder flexion Seated OH pulleys shoulder flexion  Forward tap up to cones Seated OH pulleys shoulder flexion      Slantboard gastroc stretch 3 x 30 sec Slantboard gastroc stretch 3 x 30 sec Sidestepping with 1 UE assist on bar Slantboard gastroc stretch 3 x 30 sec      Stance on Airex with head/trunk rotation SBA Stance on Airex with head/trunk rotation SBA Seated marching green band 2 x 15 Seated HS stretch 3 x 30 sec      Gait with verbal cueing for increased step length and gait speed Sidestepping with 1 UE assist at mirror Seated clams green band 2 x 15 Seated marching and clams green band 2 x 15 each      Sidestepping along bar with 1 hand assist Forward/retro walking with 1 hand assist along bar Standing marching 2 x 10 Standing marching 2 x 10      Forward/retro walking with 1 hand assist along bar Seated marching with green band 2 x 15 Seated HS stretch 3 x 30 sec Sidestepping and retro walking along bar with 1 UE assist      Supine piriformis stretch 2 x 30 sec Seated clams 2 x 15 Slantboard gastroc stretch 3 x 30 sec Reviewed HEP      Reviewed HEP Reviewed HEP Gait with verbal cueing for increased gait  speed -        Seated OH pulleys shoulder flexion -      HEP: Sit to stand, Standing gastroc stretch     Charges: TherEx x 3       Total Timed Treatment: 40 min  Total Treatment Time: 40 min

## 2024-05-09 ENCOUNTER — APPOINTMENT (OUTPATIENT)
Facility: LOCATION | Age: 89
End: 2024-05-09
Attending: FAMILY MEDICINE
Payer: MEDICARE

## 2024-05-13 ENCOUNTER — OFFICE VISIT (OUTPATIENT)
Facility: LOCATION | Age: 89
End: 2024-05-13
Attending: FAMILY MEDICINE
Payer: MEDICARE

## 2024-05-13 PROCEDURE — 97110 THERAPEUTIC EXERCISES: CPT

## 2024-05-13 NOTE — PROGRESS NOTES
Dx:  Unsteady gait (R26.81)  Recurrent falls (R29.6)           Authorized # of Visits:  12  Fall Risk: standard         Precautions: n/a             Subjective:   The patient reports he had a busy weekend with family over the Mother's Day  Current Pain Ratin/10  Objective:   See flow sheet     Assessment:   The patient tolerated treatment well. He continues to ambulate with short and wide steps     Plan:   Progress strength, balance and stability exercises as tolerated    Date: 2024  Tx#:  Date:   2024  Tx#: 3/12 Date:   2024  Tx#:  Date:   2024  Tx#:  Date:   2024  Tx#:  Date:   Tx#:  Date:   Tx#: 8/   TherEx (40 min) TherEx (40 min) TherEx (40 min) TherEx (40 min) TherEx (40 min) TherEx TherEx   Nustep L4 x 10 min Nustep L5 x 10 min Nustep L5 x 10 min Nustep L5 x 10 min Nustep L5 x 10 min     Seated OH pulleys shoulder flexion Seated OH pulleys shoulder flexion  Forward tap up to cones Seated OH pulleys shoulder flexion Seated OH pulleys shoulder flexion     Slantboard gastroc stretch 3 x 30 sec Slantboard gastroc stretch 3 x 30 sec Sidestepping with 1 UE assist on bar Slantboard gastroc stretch 3 x 30 sec Standing marching 2 x 10     Stance on Airex with head/trunk rotation SBA Stance on Airex with head/trunk rotation SBA Seated marching green band 2 x 15 Seated HS stretch 3 x 30 sec Standing hip abd x 10 R/L     Gait with verbal cueing for increased step length and gait speed Sidestepping with 1 UE assist at mirror Seated clams green band 2 x 15 Seated marching and clams green band 2 x 15 each Seated marching and clams green band 2 x 15     Sidestepping along bar with 1 hand assist Forward/retro walking with 1 hand assist along bar Standing marching 2 x 10 Standing marching 2 x 10 Sidestepping along bar with 1 UE assist     Forward/retro walking with 1 hand assist along bar Seated marching with green band 2 x 15 Seated HS stretch 3 x 30 sec Sidestepping and retro  walking along bar with 1 UE assist FSU onto Airex 2 x 10     Supine piriformis stretch 2 x 30 sec Seated clams 2 x 15 Slantboard gastroc stretch 3 x 30 sec Reviewed HEP Forward tap to 2 cones with 1 UE assist     Reviewed HEP Reviewed HEP Gait with verbal cueing for increased gait speed - -       Seated OH pulleys shoulder flexion - -     HEP: Sit to stand, Standing gastroc stretch     Charges: TherEx x 3       Total Timed Treatment: 40 min  Total Treatment Time: 40 min

## 2024-06-03 ENCOUNTER — HOSPITAL ENCOUNTER (INPATIENT)
Facility: HOSPITAL | Age: 89
LOS: 1 days | Discharge: HOME | End: 2024-06-04
Attending: EMERGENCY MEDICINE | Admitting: HOSPITALIST
Payer: MEDICARE

## 2024-06-03 ENCOUNTER — APPOINTMENT (OUTPATIENT)
Dept: CT IMAGING | Age: 89
End: 2024-06-03
Attending: PHYSICIAN ASSISTANT
Payer: MEDICARE

## 2024-06-03 ENCOUNTER — APPOINTMENT (OUTPATIENT)
Dept: GENERAL RADIOLOGY | Age: 89
End: 2024-06-03
Attending: EMERGENCY MEDICINE
Payer: MEDICARE

## 2024-06-03 ENCOUNTER — APPOINTMENT (OUTPATIENT)
Dept: CT IMAGING | Age: 89
DRG: 312 | End: 2024-06-03
Attending: PHYSICIAN ASSISTANT
Payer: MEDICARE

## 2024-06-03 ENCOUNTER — APPOINTMENT (OUTPATIENT)
Dept: GENERAL RADIOLOGY | Age: 89
DRG: 312 | End: 2024-06-03
Attending: EMERGENCY MEDICINE
Payer: MEDICARE

## 2024-06-03 ENCOUNTER — HOSPITAL ENCOUNTER (INPATIENT)
Facility: HOSPITAL | Age: 89
LOS: 1 days | Discharge: HOME | DRG: 312 | End: 2024-06-04
Attending: EMERGENCY MEDICINE | Admitting: HOSPITALIST
Payer: MEDICARE

## 2024-06-03 DIAGNOSIS — S41.111A ARM LACERATION, RIGHT, INITIAL ENCOUNTER: ICD-10-CM

## 2024-06-03 DIAGNOSIS — R55 SYNCOPE AND COLLAPSE: Primary | ICD-10-CM

## 2024-06-03 LAB
ALBUMIN SERPL-MCNC: 3.3 G/DL (ref 3.4–5)
ALBUMIN/GLOB SERPL: 0.9 {RATIO} (ref 1–2)
ALP LIVER SERPL-CCNC: 47 U/L
ALT SERPL-CCNC: 21 U/L
ANION GAP SERPL CALC-SCNC: 3 MMOL/L (ref 0–18)
AST SERPL-CCNC: 17 U/L (ref 15–37)
BASOPHILS # BLD AUTO: 0.05 X10(3) UL (ref 0–0.2)
BASOPHILS NFR BLD AUTO: 0.7 %
BILIRUB SERPL-MCNC: 0.5 MG/DL (ref 0.1–2)
BILIRUB UR QL STRIP.AUTO: NEGATIVE
BUN BLD-MCNC: 18 MG/DL (ref 9–23)
CALCIUM BLD-MCNC: 9 MG/DL (ref 8.5–10.1)
CHLORIDE SERPL-SCNC: 109 MMOL/L (ref 98–112)
CLARITY UR REFRACT.AUTO: CLEAR
CO2 SERPL-SCNC: 27 MMOL/L (ref 21–32)
COLOR UR AUTO: YELLOW
CREAT BLD-MCNC: 0.74 MG/DL
EGFRCR SERPLBLD CKD-EPI 2021: 82 ML/MIN/1.73M2 (ref 60–?)
EOSINOPHIL # BLD AUTO: 0.17 X10(3) UL (ref 0–0.7)
EOSINOPHIL NFR BLD AUTO: 2.2 %
ERYTHROCYTE [DISTWIDTH] IN BLOOD BY AUTOMATED COUNT: 14.3 %
GLOBULIN PLAS-MCNC: 3.5 G/DL (ref 2.8–4.4)
GLUCOSE BLD-MCNC: 116 MG/DL (ref 70–99)
GLUCOSE UR STRIP.AUTO-MCNC: NEGATIVE MG/DL
HCT VFR BLD AUTO: 40.9 %
HGB BLD-MCNC: 13.9 G/DL
IMM GRANULOCYTES # BLD AUTO: 0.02 X10(3) UL (ref 0–1)
IMM GRANULOCYTES NFR BLD: 0.3 %
KETONES UR STRIP.AUTO-MCNC: NEGATIVE MG/DL
LEUKOCYTE ESTERASE UR QL STRIP.AUTO: NEGATIVE
LYMPHOCYTES # BLD AUTO: 1.83 X10(3) UL (ref 1–4)
LYMPHOCYTES NFR BLD AUTO: 24 %
MAGNESIUM SERPL-MCNC: 2.3 MG/DL (ref 1.6–2.6)
MCH RBC QN AUTO: 30.5 PG (ref 26–34)
MCHC RBC AUTO-ENTMCNC: 34 G/DL (ref 31–37)
MCV RBC AUTO: 89.7 FL
MONOCYTES # BLD AUTO: 0.71 X10(3) UL (ref 0.1–1)
MONOCYTES NFR BLD AUTO: 9.3 %
NEUTROPHILS # BLD AUTO: 4.85 X10 (3) UL (ref 1.5–7.7)
NEUTROPHILS # BLD AUTO: 4.85 X10(3) UL (ref 1.5–7.7)
NEUTROPHILS NFR BLD AUTO: 63.5 %
NITRITE UR QL STRIP.AUTO: NEGATIVE
OSMOLALITY SERPL CALC.SUM OF ELEC: 291 MOSM/KG (ref 275–295)
PH UR STRIP.AUTO: 7 [PH] (ref 5–8)
PLATELET # BLD AUTO: 212 10(3)UL (ref 150–450)
POTASSIUM SERPL-SCNC: 4.1 MMOL/L (ref 3.5–5.1)
PROT SERPL-MCNC: 6.8 G/DL (ref 6.4–8.2)
PROT UR STRIP.AUTO-MCNC: NEGATIVE MG/DL
RBC # BLD AUTO: 4.56 X10(6)UL
RBC UR QL AUTO: NEGATIVE
SODIUM SERPL-SCNC: 139 MMOL/L (ref 136–145)
SP GR UR STRIP.AUTO: 1.01 (ref 1–1.03)
TROPONIN I SERPL HS-MCNC: 14 NG/L
UROBILINOGEN UR STRIP.AUTO-MCNC: 0.2 MG/DL
WBC # BLD AUTO: 7.6 X10(3) UL (ref 4–11)

## 2024-06-03 PROCEDURE — 70450 CT HEAD/BRAIN W/O DYE: CPT | Performed by: PHYSICIAN ASSISTANT

## 2024-06-03 PROCEDURE — 71045 X-RAY EXAM CHEST 1 VIEW: CPT | Performed by: EMERGENCY MEDICINE

## 2024-06-03 RX ORDER — ISOSORBIDE MONONITRATE 30 MG/1
30 TABLET, EXTENDED RELEASE ORAL DAILY
COMMUNITY

## 2024-06-03 RX ORDER — PANTOPRAZOLE SODIUM 40 MG/1
40 TABLET, DELAYED RELEASE ORAL
COMMUNITY

## 2024-06-04 ENCOUNTER — APPOINTMENT (OUTPATIENT)
Dept: CV DIAGNOSTICS | Facility: HOSPITAL | Age: 89
End: 2024-06-04
Attending: INTERNAL MEDICINE
Payer: MEDICARE

## 2024-06-04 ENCOUNTER — APPOINTMENT (OUTPATIENT)
Dept: CV DIAGNOSTICS | Facility: HOSPITAL | Age: 89
DRG: 312 | End: 2024-06-04
Attending: INTERNAL MEDICINE
Payer: MEDICARE

## 2024-06-04 VITALS
RESPIRATION RATE: 18 BRPM | HEIGHT: 62 IN | OXYGEN SATURATION: 96 % | TEMPERATURE: 99 F | HEART RATE: 68 BPM | SYSTOLIC BLOOD PRESSURE: 121 MMHG | DIASTOLIC BLOOD PRESSURE: 81 MMHG | WEIGHT: 142.19 LBS | BODY MASS INDEX: 26.17 KG/M2

## 2024-06-04 PROBLEM — S41.111A ARM LACERATION, RIGHT, INITIAL ENCOUNTER: Status: ACTIVE | Noted: 2024-06-04

## 2024-06-04 PROBLEM — R55 SYNCOPE AND COLLAPSE: Status: ACTIVE | Noted: 2024-06-04

## 2024-06-04 LAB
ATRIAL RATE: 58 BPM
P AXIS: 12 DEGREES
P-R INTERVAL: 158 MS
Q-T INTERVAL: 450 MS
QRS DURATION: 132 MS
QTC CALCULATION (BEZET): 441 MS
R AXIS: -48 DEGREES
T AXIS: -4 DEGREES
VENTRICULAR RATE: 58 BPM

## 2024-06-04 PROCEDURE — 93308 TTE F-UP OR LMTD: CPT | Performed by: INTERNAL MEDICINE

## 2024-06-04 PROCEDURE — 99235 HOSP IP/OBS SAME DATE MOD 70: CPT | Performed by: INTERNAL MEDICINE

## 2024-06-04 RX ORDER — ASPIRIN 81 MG/1
81 TABLET, CHEWABLE ORAL DAILY
Status: DISCONTINUED | OUTPATIENT
Start: 2024-06-04 | End: 2024-06-04

## 2024-06-04 RX ORDER — MELATONIN
3 NIGHTLY PRN
Status: DISCONTINUED | OUTPATIENT
Start: 2024-06-04 | End: 2024-06-04

## 2024-06-04 RX ORDER — ONDANSETRON 2 MG/ML
4 INJECTION INTRAMUSCULAR; INTRAVENOUS EVERY 6 HOURS PRN
Status: DISCONTINUED | OUTPATIENT
Start: 2024-06-04 | End: 2024-06-04

## 2024-06-04 RX ORDER — ACETAMINOPHEN 500 MG
1000 TABLET ORAL EVERY 8 HOURS PRN
Status: DISCONTINUED | OUTPATIENT
Start: 2024-06-04 | End: 2024-06-04

## 2024-06-04 RX ORDER — SENNOSIDES 8.6 MG
17.2 TABLET ORAL NIGHTLY PRN
Status: DISCONTINUED | OUTPATIENT
Start: 2024-06-04 | End: 2024-06-04

## 2024-06-04 RX ORDER — BENZONATATE 100 MG/1
200 CAPSULE ORAL 3 TIMES DAILY PRN
Status: DISCONTINUED | OUTPATIENT
Start: 2024-06-04 | End: 2024-06-04

## 2024-06-04 RX ORDER — ENEMA 19; 7 G/133ML; G/133ML
1 ENEMA RECTAL ONCE AS NEEDED
Status: DISCONTINUED | OUTPATIENT
Start: 2024-06-04 | End: 2024-06-04

## 2024-06-04 RX ORDER — ENOXAPARIN SODIUM 100 MG/ML
40 INJECTION SUBCUTANEOUS DAILY
Status: DISCONTINUED | OUTPATIENT
Start: 2024-06-04 | End: 2024-06-04

## 2024-06-04 RX ORDER — LISINOPRIL 10 MG/1
10 TABLET ORAL 2 TIMES DAILY
Status: DISCONTINUED | OUTPATIENT
Start: 2024-06-04 | End: 2024-06-04

## 2024-06-04 RX ORDER — POLYETHYLENE GLYCOL 3350 17 G/17G
17 POWDER, FOR SOLUTION ORAL DAILY PRN
Status: DISCONTINUED | OUTPATIENT
Start: 2024-06-04 | End: 2024-06-04

## 2024-06-04 RX ORDER — LISINOPRIL 10 MG/1
10 TABLET ORAL 2 TIMES DAILY
COMMUNITY

## 2024-06-04 RX ORDER — PANTOPRAZOLE SODIUM 40 MG/1
40 TABLET, DELAYED RELEASE ORAL
Status: DISCONTINUED | OUTPATIENT
Start: 2024-06-04 | End: 2024-06-04

## 2024-06-04 RX ORDER — METOCLOPRAMIDE HYDROCHLORIDE 5 MG/ML
10 INJECTION INTRAMUSCULAR; INTRAVENOUS EVERY 8 HOURS PRN
Status: DISCONTINUED | OUTPATIENT
Start: 2024-06-04 | End: 2024-06-04

## 2024-06-04 RX ORDER — ISOSORBIDE MONONITRATE 30 MG/1
30 TABLET, EXTENDED RELEASE ORAL DAILY
Status: DISCONTINUED | OUTPATIENT
Start: 2024-06-04 | End: 2024-06-04

## 2024-06-04 RX ORDER — BISACODYL 10 MG
10 SUPPOSITORY, RECTAL RECTAL
Status: DISCONTINUED | OUTPATIENT
Start: 2024-06-04 | End: 2024-06-04

## 2024-06-04 RX ORDER — ECHINACEA PURPUREA EXTRACT 125 MG
1 TABLET ORAL
Status: DISCONTINUED | OUTPATIENT
Start: 2024-06-04 | End: 2024-06-04

## 2024-06-04 NOTE — H&P
[facility]     Beba Mijares Patient Status:  Inpatient    1926 MRN [id number]   Location [facility] 3NE-A Attending Alvaro Mcginnis,    Hosp Day # 0 PCP CLARK HIGGINS     Chief Complaint: Syncope    Subjective:    History of Present Illness:     Beba Mijares is a 89 year old male with past medical history of CAD presents following a syncopal episode    Patient was at home with his wife, he had a syncopal episode.  He had a similar episode earlier this year.  He only complains of pain in his right arm where he fell, he was found to have a lacerated elbow which was sutured in the emergency department.  He is denying chest pain or difficulty breathing.  He denies pain anywhere else.  No recent illness.    History/Other:    Past Medical History:  Past Medical History:    Atherosclerosis of coronary artery    Stents 3    BLOOD CLOTS    Colitis    Deaf    Heart attack (HCC)    NSTEMI    High blood pressure    High cholesterol    Kidney stone    Pulmonary embolism (HCC)    DVT & PE POst  Fractured Metatarsil    Stroke (HCC)     Past Surgical History:   Past Surgical History:   Procedure Laterality Date    Angiogram      Angioplasty (coronary)  16      Stents LAD, CIRC, RCA    Cath cartotid stent      Cath drug eluting stent      Prostate biopsies        Family History:   Family History   Problem Relation Age of Onset    Hypertension Sister     Cancer Brother      Social History:    reports that he has quit smoking. His smoking use included cigarettes. He does not have any smokeless tobacco history on file. He reports that he does not currently use alcohol. He reports that he does not use drugs.     Allergies: No Known Allergies    Medications:    Current Facility-Administered Medications on File Prior to Encounter   Medication Dose Route Frequency Provider Last Rate Last Admin    [COMPLETED] sodium chloride 0.9 % IV bolus 1,000 mL  1,000 mL Intravenous Once Norm Jalloh  MD KATELYN   Stopped at 03/29/24 8605     Current Outpatient Medications on File Prior to Encounter   Medication Sig Dispense Refill    isosorbide mononitrate ER 30 MG Oral Tablet 24 Hr Take 1 tablet (30 mg total) by mouth daily.      pantoprazole 40 MG Oral Tab EC Take 1 tablet (40 mg total) by mouth 2 (two) times daily before meals.      lisinopril 20 MG Oral Tab Take 0.5 tablets (10 mg total) by mouth daily. 30 tablet     multivitamin Oral Tab Take 1 tablet by mouth daily.      aspirin 81 MG Oral Chew Tab Chew 1 tablet (81 mg total) by mouth daily.      NITROSTAT 0.4 MG Sublingual SL Tab nitroGLYcerin (NITROSTAT) 0.4 MG sublingual tablet, [RxNorm: 913336]      olopatadine 0.1 % Ophthalmic Solution Apply 2 drops to eye 2 (two) times daily. (Patient not taking: Reported on 6/3/2024)      Clopidogrel Bisulfate 75 MG Oral Tab Take 1 tablet (75 mg total) by mouth daily. (Patient not taking: Reported on 6/3/2024) 30 tablet 3    Rosuvastatin Calcium (CRESTOR) 10 MG Oral Tab Take 1 tablet (10 mg total) by mouth daily. (Patient not taking: Reported on 6/3/2024)      Ranitidine HCl (ZANTAC) 15 MG/ML Oral Syrup Take  by mouth as needed. (Patient not taking: Reported on 6/3/2024)         Review of Systems:   A comprehensive review of systems was completed.    Pertinent positives and negatives noted in the HPI.    Objective:   Physical Exam:    /80   Pulse 70   Temp 98.1 °F (36.7 °C) (Oral)   Resp 20   Ht 5' 6\" (1.676 m)   Wt 140 lb (63.5 kg)   SpO2 96%   BMI 22.60 kg/m²   General: No acute distress, Alert  Respiratory: No rhonchi, no wheezes  Cardiovascular: S1, S2. Regular rate and rhythm  Abdomen: Soft, Non-tender, non-distended, positive bowel sounds  Neuro: No new focal deficits  Extremities: No edema      Results:    Labs:      Labs Last 24 Hours:    Recent Labs   Lab 06/03/24  2304   RBC 4.56   HGB 13.9   HCT 40.9   MCV 89.7   MCH 30.5   MCHC 34.0   RDW 14.3   NEPRELIM 4.85   WBC 7.6   .0       Recent  Labs   Lab 06/03/24  2304   *   BUN 18   CREATSERUM 0.74   EGFRCR 82   CA 9.0   ALB 3.3*      K 4.1      CO2 27.0   ALKPHO 47   AST 17   ALT 21   BILT 0.5   TP 6.8       Lab Results   Component Value Date    INR 1.05 09/18/2016    INR 1.03 07/06/2016       Recent Labs   Lab 06/03/24  2304   TROPHS 14       No results for input(s): \"TROP\", \"PBNP\" in the last 168 hours.    No results for input(s): \"PCT\" in the last 168 hours.    Imaging: Imaging data reviewed in Epic.    Assessment & Plan:      #Recurrent Syncopal episodes   -telemetry  -orthostatic vital signs  -Cardiology consulted    #CAD  -ASA/Imdur/Statin    #HTN  -Lisinopril    #GERD  -PPI        Plan of care discussed with Dr. Khoa Mcginnis, DO    Supplementary Documentation:     The 21st Century Cures Act makes medical notes like these available to patients in the interest of transparency. Please be advised this is a medical document. Medical documents are intended to carry relevant information, facts as evident, and the clinical opinion of the practitioner. The medical note is intended as peer to peer communication and may appear blunt or direct. It is written in medical language and may contain abbreviations or verbiage that are unfamiliar.               **Certification      PHYSICIAN Certification of Need for Inpatient Hospitalization - Initial Certification    Patient will require inpatient services that will reasonably be expected to span two midnight's based on the clinical documentation in H+P.   Based on patients current state of illness, I anticipate that, after discharge, patient will require TBD.

## 2024-06-04 NOTE — PROGRESS NOTES
06/04/24 0221 06/04/24 0222 06/04/24 0224   Vitals   /80 128/83 (!) 152/91   MAP (mmHg) 99 98 (!) 109   BP Location Left arm Left arm Left arm   BP Method Automatic Automatic Automatic   Patient Position Lying Sitting Standing

## 2024-06-04 NOTE — PROGRESS NOTES
Beba Mijares Patient Status:  Emergency    1926 MRN [id number]   Location [facility] [place] Attending Jose Couch,    Hosp Day # 0 PCP CLARK HIGGINS     Cardiology Nocturnal APN Note    Briefly: (Documentation from chart review)     Beba Mijares is a 89 y/o male with PMH/PSH of HTN and CAD s/p PCI who presented with recurrent syncope.     Primary Cardiologist Sonny    Vital Signs:       6/3/2024    11:45 PM 2024    12:44 AM   Vitals History   /70 144/87   Pulse 62 70   Resp 17 20   SpO2 96 %         Labs:   Lab Results   Component Value Date    WBC 7.6 2024    HGB 13.9 2024    HCT 40.9 2024    .0 2024    CREATSERUM 0.74 2024    BUN 18 2024     2024    K 4.1 2024     2024    CO2 27.0 2024     2024    CA 9.0 2024    ALB 3.3 2024    ALKPHO 47 2024    BILT 0.5 2024    TP 6.8 2024    AST 17 2024    ALT 21 2024    MG 2.3 2024    TROPHS 14 2024       Diagnostics:   XR CHEST AP PORTABLE  (CPT=71045)    Result Date: 6/3/2024  CONCLUSION: No acute cardiopulmonary abnormality.   LOCATION:  [facility]      Dictated by (CST): Nicholas Veronica MD on 2024 at 11:36 PM     Finalized by (CST): Nicholas Veronica MD on 2024 at 11:36 PM       CT BRAIN OR HEAD (90041)    Result Date: 6/3/2024  CONCLUSION:  No acute intracranial abnormality    LOCATION:  [facility]   Dictated by (CST): Nicholas Veronica MD on 2024 at 11:30 PM     Finalized by (CST): Nicholas Veronica MD on 2024 at 11:31 PM        Allergies:  No Known Allergies    Medications:  No current facility-administered medications for this encounter.    Assessment:   Syncope  - Does not appear to have had prodrome   - Previous workup in office with normal LVEF and no significant bradycardia or AV block on MCT  - Monitor on tele  - Orthostatic vitals      Plan:    - Continue to monitor  overnight  - Formal Cardiology consult to follow in AM.       ABDIEL Kat  [facility]  6/4/2024  1:07 AM

## 2024-06-04 NOTE — HISTORICAL OFFICE NOTE
[facility]  Outside Information  Continuity of Care Document  1/16/2024  Beba Mijares - 89 y.o. Male; born [birth year]Summary of episode note, generated on Mar. 04, 2024March 04, 2024   CHIEF COMPLAINT    CHIEF COMPLAINT  Reason for Visit/Chief Complaint   follow up after holter and echo   Patient is here for follow-up visit. Overall he is doing well. He is under quite amount of stress. He is taking care of of his wife due to dementia issues. He has not had any more falls or syncope.     PROBLEMS  Reconcile with Patient's ChartPROBLEMS  Problem Effective Dates Date resolved Problem Status   Coronary artery disease, [SNOMED-CT: 91435666] 12/16/2019 - Active   S/P drug eluting coronary stent placement, [SNOMED-CT: 582746981] 6/6/2019 - Active   Benign essential HTN, [SNOMED-CT: 6413626] 6/6/2019 - Active   Atherosclerosis of native coronary artery of native heart without angina pectoris, [SNOMED-CT: 128456907] 6/6/2019 - Active     ENCOUNTER DIAGNOSIS    ENCOUNTER DIAGNOSIS  Problem Effective Dates Date resolved Problem Status   Syncope and collapse, [SNOMED-CT: 431932681] 12/6/2023 - Active   S/P drug eluting coronary stent placement, [SNOMED-CT: 951376391] 6/6/2019 - Active   Benign essential HTN, [SNOMED-CT: 8657762] 6/6/2019 - Active   Atherosclerosis of native coronary artery of native heart without angina pectoris, [SNOMED-CT: 895038908] 6/6/2019 - Active     VITAL SIGNS    VITAL SIGNS  Date / Time: 1/16/2024   BP Systolic 140 mmHg   BP Diastolic 64 mmHg   Height 65 inches   Weight 152 lbs   Pulse Rate 62 bpm   BSA (Body Surface Area) 1.8 cc/m2   BMI (Body Mass Index) 25.3 cc/m2   Blood Pressure 140 / 64 mmHg     PHYSICAL EXAMINATION    PHYSICAL EXAMINATION  Header Details   Vitals Right Arm Sitting  / 64 mmHg, Pulse rate 62 bpm, Height in 5' 5\", BMI: 25.3, Weight in 152.01 lbs (or) 68.95 kgs, BSA : 1.79 cc/m²   General Appearance No Acute Distress   Cardiovascular       ALLERGIES, ADVERSE REACTIONS, ALERTS  Reconcile with Patient's ChartNo data available    MEDICATIONS ADMINISTERED DURING VISIT    No data available    MEDICATIONS  Reconcile with Patient's ChartMEDICATIONS  Medication Start Date Route/Frequency Status   aspirin 81 MG tablet, [RxNorm: 668517] 8/3/2021 Take 81 mg by mouth daily. Active   isosorbide mononitrate (IMDUR) 30 MG 24 hr tablet, [RxNorm: 189498] 8/3/2021 Take 30 mg by mouth. Active   lisinopriL 10 mg tablet, [RxNorm: 129407] 12/6/2023 Take 1 tablet orally 2 times a day. Active   nitroGLYcerin (NITROSTAT) 0.4 MG sublingual tablet, [RxNorm: 389912] 8/3/2021 Place 1 tablet under the tongue every 5 minutes as needed for Chest pain. Active   nitroglycerin 0.4 mg sublingual tablet, [RxNorm: 381022] 12/6/2023 Take 1 tablet (sublingual) as needed for chest pain Active   pantoprazole (PROTONIX) 40 MG tablet, [RxNorm: 216924] 8/3/2021 Take 40 mg by mouth 2 times daily. Active   rosuvastatin 10 mg tablet, [RxNorm: 596787] 12/6/2023 Take 1 tablet orally once a day. Active   lisinopriL 10 mg tablet, [RxNorm: 900898] 1/16/2024 Take 1 tablet orally 2 times a day. Active   rosuvastatin 10 mg tablet, [RxNorm: 054551] 1/16/2024 Take 1 tablet orally once a day. Active     ASSESSMENT    Patient is here for follow-up visit. Since our last visit, he has had no falls. There is no syncope. I reviewed his MCT monitor and echo with him. There were no clear issues with this. His LV function is normal. No heart block was significant. Will continue with medical therapy. Patient will be 89 years old in the summer. I will see him in a year. He will call with questions or concerns     FAMILY HISTORY    No data available    GENERAL STATUS    No data available    PAST MEDICAL HISTORY    PAST MEDICAL HISTORY  Problem Date diagonsed Date resolved Status   Coronary artery disease, [SNOMED-CT: 65731581] 12/16/2019 - Active   S/P drug eluting coronary stent placement, [SNOMED-CT: 153123505]  6/6/2019 - Active   Benign essential HTN, [SNOMED-CT: 0715604] 6/6/2019 - Active   Atherosclerosis of native coronary artery of native heart without angina pectoris, [SNOMED-CT: 928327145] 6/6/2019 - Active     HISTORY OF PRESENT ILLNESS    Patient is here for follow-up visit. Overall he is doing well. He is under quite amount of stress. He is taking care of of his wife due to dementia issues. He has not had any more falls or syncope.     IMMUNIZATIONS  Reconcile with Patient's ChartNo data available    PLAN OF CARE    PLAN OF CARE  Planned Care Date   Take 1 tablet orally 2 times a day.-lisinopriL 10 mg tablet 1/16/2024   Take 1 tablet orally once a day.-rosuvastatin 10 mg tablet 1/16/2024   Follow up visit - Dusty Dennis MD 9/30/2024     PROCEDURES    No data available    RESULTS    RESULTS  Name Result Date Location - Ordered By   Holter Monitoring 1.This is an excellent quality study. 2.Predominant rhythm is normal sinus rhythm. 3.The minimum heart rate recorded was 37 beats / minute (12/11/2023). The maximum heart rate is 149 beats / minute (12/7/2023). The mean heart rate is 64 beats / minute. 4.No evidence of atrial fibrillation noted. 5.Afib [place] 0%6.Lowest HR was during presumed hours of sleeping. 12/6/2023 2:00:00 PM Luma Hazel MD     REVIEW OF SYSTEMS    REVIEW OF SYSTEMS  Header Details   Constitutional No history of Weight Loss   Eyes No history of Blurry vision   ENT No history of Bloody nose (epistaxis)   Gastrointestinal No history of Abdominal pain   Cardiovascular No history of Chest pain, DOWNS, Palpitations, Syncope, PND, Orthopnea, Edema, Claudication   Genitourinary No history of Dysuria   Musculoskeletal No history of Myalgias   Respiratory No history of SOB   Neurological No history of Migraines   Endocrine No history of Polyuria   Hem/Lymphatic No history of Easy bruising   Integumentary No history of Rashes     SOCIAL HISTORY    SOCIAL HISTORY  Social History Element Description  Effective Dates   Smoking status Never smoked -     FUNCTIONAL STATUS    No data available    MEDICAL EQUIPMENT    No data available    Goals Sections    No data available    REASON FOR REFERRAL    No data available    Health Concerns Section    No data available    COGNITIVE/MENTAL STATUS    No data available    Patient Demographics    Patient Demographics  Patient Address Patient Name Communication   [address] [place] [address] Beba Mijares [phone] (Mobile)     Patient Demographics  Language Race / Ethnicity Marital Status   English - Spoken (Preferred) White / Not  or       Document Information    Primary Care Provider Other Service Providers Document Coverage Dates   Dusty Dennis  NPI: [id number]  [phone] (Work)  [address] [place] [address]  [place] [address]  Interpreting Physicians  [facility]  [phone] (Work)  [address] [place] [id number] Jessie Carreno  NPI: [id number]  [phone] (Work)  [address] [place] [address]  [place] [address]  Nurses Jan. 16, 2024January 16, 2024      Organization   [facility]  [phone] (Work)  [address] [place] [id number]  [place] [address]     Encounter Providers Encounter Date    Jan. 16, 2024January 16, 2024     Legal Authenticator    Dusty Dennis  NPI: [id number]  [phone] (Work)  [address] [place] [address]  [place] [address]

## 2024-06-04 NOTE — CONSULTS
[facility]  Cardiology Consultation    Beba Mijares Patient Status:  Inpatient    1926 MRN [id number]   Location [facility] 3NE-A Attending Clifford Peterson, DO   Hosp Day # 0 PCP CLARK HIGGINS     Reason for Consultation:  Syncope    History of Present Illness:  Beba Mijares is a a(n) 89 year old male with hx of CAD with prior stenting who presents with witnessed syncope.  He was preparing supper for his wife, who has dementia.  Last thing he remembers was being in living room.  No confusion after her regained his consciousness.  Denies CP, palpitations or heart failure symptoms.  No seizure like activity.  No loss of urine or bowel continence.  Similar episode earlier this year.    ECG shows SB with RBBB, ventricualr rate 58, as low as 47 bpm in   HS troponin negative; blood work and urine unremarkable    Echo 2024:  1. Left ventricle: The cavity size was normal. Wall thickness was mildly      increased. Systolic function was normal. The estimated ejection fraction      was 65-70%, by visual assessment. Wall motion is normal; there are no      regional wall motion abnormalities. Left ventricular diastolic function      parameters were normal for the patient's age.   2. Right ventricle: The cavity size was normal. Systolic function was      normal. Systolic pressure was at the upper limits of normal.   3. Left atrium: The left atrial volume was mildly increased.   4. Aortic valve: The valve was trileaflet. The leaflets were mildly to      moderately calcified. Cusp separation was at the lower limits of normal.      Transvalvular velocity was within the normal range. There was no evidence      for stenosis. There was moderate regurgitation. The peak systolic      velocity was 1.96m/sec. The mean systolic gradient was 8mm Hg. The valve      area (VTI) was 1.98cm^2. The valve area (VTI) index was 1.25cm^2/m^2.   5. Aortic root: The aortic root was 3.9cm diameter.   6. Ascending aorta: The  ascending aorta was 4.2cm diameter.   7. Pulmonary arteries: Systolic pressure was at the upper limits of normal,      estimated to be 33mm Hg.   Impressions:  No previous study was available for comparison.     Nuc 2019 with small reversible inferior defect    History:  Past Medical History:    Atherosclerosis of coronary artery    Stents 3    BLOOD CLOTS    Colitis    Deaf    Heart attack (HCC)    NSTEMI    High blood pressure    High cholesterol    Kidney stone    Pulmonary embolism (HCC)    DVT & PE POst  Fractured Metatarsil    Stroke (HCC)     Past Surgical History:   Procedure Laterality Date    Angiogram      Angioplasty (coronary)  9/21/16      Stents LAD, CIRC, RCA    Cath cartotid stent      Cath drug eluting stent      Prostate biopsies       Family History   Problem Relation Age of Onset    Hypertension Sister     Cancer Brother       reports that he has quit smoking. His smoking use included cigarettes. He does not have any smokeless tobacco history on file. He reports that he does not currently use alcohol. He reports that he does not use drugs.    Allergies:  No Known Allergies    Medications:    Current Facility-Administered Medications:     aspirin chewable tab 81 mg, 81 mg, Oral, Daily    isosorbide mononitrate ER (Imdur) 24 hr tab 30 mg, 30 mg, Oral, Daily    lisinopril (Zestril) tab 10 mg, 10 mg, Oral, BID    pantoprazole (Protonix) DR tab 40 mg, 40 mg, Oral, BID AC    acetaminophen (Tylenol Extra Strength) tab 1,000 mg, 1,000 mg, Oral, Q8H PRN    melatonin tab 3 mg, 3 mg, Oral, Nightly PRN    enoxaparin (Lovenox) 40 MG/0.4ML SUBQ injection 40 mg, 40 mg, Subcutaneous, Daily    ondansetron (Zofran) 4 MG/2ML injection 4 mg, 4 mg, Intravenous, Q6H PRN    metoclopramide (Reglan) 5 mg/mL injection 10 mg, 10 mg, Intravenous, Q8H PRN    polyethylene glycol (PEG 3350) (Miralax) 17 g oral packet 17 g, 17 g, Oral, Daily PRN    sennosides (Senokot) tab 17.2 mg, 17.2 mg, Oral, Nightly PRN    bisacodyl  (Dulcolax) 10 MG rectal suppository 10 mg, 10 mg, Rectal, Daily PRN    fleet enema (Fleet) 7-19 GM/118ML rectal enema 133 mL, 1 enema, Rectal, Once PRN    benzonatate (Tessalon) cap 200 mg, 200 mg, Oral, TID PRN    guaiFENesin (Robitussin) 100 MG/5 ML oral liquid 200 mg, 200 mg, Oral, Q4H PRN    glycerin-hypromellose- (Artificial Tears) 0.2-0.2-1 % ophthalmic solution 1 drop, 1 drop, Both Eyes, QID PRN    sodium chloride (Saline Mist) 0.65 % nasal solution 1 spray, 1 spray, Each Nare, Q3H PRN    Review of Systems:  A comprehensive review of systems was negative if not otherwise mention in above HPI.    BP (!) 152/91 (BP Location: Left arm)   Pulse 68   Temp 98.1 °F (36.7 °C) (Oral)   Resp 18   Ht 62\"   Wt 142 lb 3.2 oz   SpO2 95%   BMI 26.01 kg/m²   Temp (24hrs), Av.1 °F (36.7 °C), Min:98.1 °F (36.7 °C), Max:98.1 °F (36.7 °C)       Intake/Output Summary (Last 24 hours) at 2024 0646  Last data filed at 2024 0300  Gross per 24 hour   Intake --   Output 300 ml   Net -300 ml     Wt Readings from Last 3 Encounters:   24 142 lb 3.2 oz   24 140 lb   16 153 lb 3.5 oz       Physical Exam:   General: Alert and oriented x 3.    HEENT: Normocephalic, anicteric sclera, neck supple.  Neck: No JVD   Cardiac: Regular rate and rhythm. S1, S2 normal. Systolic/diastolic murmur RUSB  Lungs: Clear anteriorly  Extremities: Without edema  Neurologic: Alert and oriented, normal affect.  Skin: Warm and dry.     Laboratory Data:  Lab Results   Component Value Date    WBC 7.6 2024    HGB 13.9 2024    HCT 40.9 2024    .0 2024    CREATSERUM 0.74 2024    BUN 18 2024     2024    K 4.1 2024     2024    CO2 27.0 2024     2024    CA 9.0 2024    ALB 3.3 2024    ALKPHO 47 2024    BILT 0.5 2024    TP 6.8 2024    AST 17 2024    ALT 21 2024    MG 2.3 2024        Imaging:  CXR with no acute pathology  CT brain - no acute intracranial abnormallity    Impression:  Principal Problem:    Syncope and collapse  Active Problems:    Arm laceration, right, initial encounter    Syncope  CAD with prior stenting  Aortic insufficiency - moderate on recent echo    Recommendations:  - tele monitoring while in hospital, 30 day MCT at discharge  - limited echo  - no indication for PPM at present  - hold AV reagan blocking agents with bradycardia at night - ventricular rates in high 40's    Thank you for allowing me to participate in the care of your patient.    Josué Castellanos MD  6/4/2024  6:46 AM

## 2024-06-04 NOTE — PROGRESS NOTES
NURSING ADMISSION NOTE      Patient admitted via Ambulance  Oriented to room 3601.   Safety precautions initiated.  Bed in low position.  Call light in reach.    Received pt to unit around 0230. Admission navigator completed with pt, pt daughter, and this RN. A&O X3, RA, VSS, NSR/SB on tele. Cardiac electrolyte replacement protocol. Lab draw. PIV in L AC flushed and capped. Dressing CDI. Lovenox for VTE. Regular diet, pills whole with thin. Continent, using bedside urinal. High fall risk, bed alarm on. Denies pain. Up SBA. Laceration to R elbow from fall dressed with ABD pad and ACE bandage. Dressing changed, small amount of serosanguineous drainage. Very [place]. Orthostatics completed. PT/OT to see.

## 2024-06-04 NOTE — PLAN OF CARE
Please see H&P for further details. Patient has no complaints and feels like his normal self. Plan for limited echo today and PT/OT eval. Possible discharge later today with MCT on discharge.    Clifford Peterson, DO

## 2024-06-04 NOTE — OCCUPATIONAL THERAPY NOTE
OCCUPATIONAL THERAPY EVALUATION - INPATIENT    Room Number: 3601/3601-A  Evaluation Date: 6/4/2024     Type of Evaluation: Initial  Presenting Problem: syncope and collapse, right arm laceration    Physician Order: IP Consult to Occupational Therapy  Reason for Therapy:  ADL/IADL Dysfunction and Discharge Planning      OCCUPATIONAL THERAPY ASSESSMENT   Patient is a 89 year old male admitted on 6/3/2024 with Presenting Problem: syncope and collapse, right arm laceration. Co-Morbidities : CAD, HTN, CAD, NSTEMI  Patient is currently functioning at baseline with selfcare and functional mobility.  Prior to admission, patient's baseline is independent.  Patient met all OT goals at mod I to independent level.  Patient reports no further questions/concerns at this time.     Patient will be discharged from OT services at this time.  Will benefit from home at discharge from hospital.    WEIGHT BEARING RESTRICTION  Weight Bearing Restriction: None                Recommendations for nursing staff:   Transfers: supervision  Toileting location: Toilet    EVALUATION SESSION:  Patient at start of session: supine  FUNCTIONAL TRANSFER ASSESSMENT  Sit to Stand: Edge of Bed; Chair  Edge of Bed: Modified Independent  Chair: Modified Independent  Toilet Transfer: Modified Independent    BED MOBILITY  Rolling: Modified Independent  Supine to Sit : Modified Independent  Sit to Supine (OT): Modified Independent  Scooting: mod I    BALANCE ASSESSMENT  Static Sitting: Independent  Static Standing: Modified Independent    FUNCTIONAL ADL ASSESSMENT  Eating: Independent  Grooming Seated: Independent  LB Dressing Seated: Modified Independent  LB Dressing Standing: Modified Independent  Toileting Seated: Modified Independent      ACTIVITY TOLERANCE: WFL   SBP stable between 117-126 in supine, sitting and standing                       O2 SATURATIONS       COGNITION  WFL  COGNITION ASSESSMENTS       Upper Extremity:   ROM: within functional  limits w/ chronic limited end range right shoulder flexion and abduction  Strength: is within functional limits   Coordination:  Gross motor:   Fine motor: WFL  Sensation: light touch intact BUE    EDUCATION PROVIDED  Patient: Role of Occupational Therapy; Plan of Care; Discharge Recommendations  Patient's Response to Education: Verbalized Understanding; Returned Demonstration  Family/Caregiver: Role of Occupational Therapy; Plan of Care; Discharge Recommendations  Family/Caregiver's Response to Education: Verbalized Understanding    Equipment used: none  Demonstrates functional use    Therapist comments: OT educated patient on safety,  sequencing, energy conservation, pain management, home modifications and adaptive equipment to increase independence with ADLs.      Patient End of Session: Needs met;Call light within reach;RN aware of session/findings;Up in chair;With  staff;All patient questions and concerns addressed;Family present;Discussed recommendations with /    OCCUPATIONAL PROFILE    HOME SITUATION  Type of Home: House  Home Layout: One level  Lives With: Spouse (pt provides supervison d/t wife has dementia)    Toilet and Equipment: Standard height toilet  Shower/Tub and Equipment: Walk-in shower;Shower chair;Grab bar  Other Equipment: First-alert system (Rollator, cane)    Occupation/Status: retired  Hand Dominance: Right  Drives: Yes  Patient Regularly Uses: Reading glasses;Hearing aides    Prior Level of Function: independent with ADls and functional mobility with occ use of cane.  Patient provides supervision for his wife of 73 years d/t suffers from dementia.  Patient just finished attending outpatient PT for right shoulder.  Typically rides his stationary bike for 4-5 miles.  Patient's most recent fall was on Good Friday.      SUBJECTIVE  \"I drive a [place].\"    PAIN ASSESSMENT  Rating: Unable to rate  Location: right UE  Management Techniques: Activity  promotion    OBJECTIVE  Precautions: Hard of hearing  Fall Risk: Standard fall risk    WEIGHT BEARING RESTRICTION  Weight Bearing Restriction: None                AM-PAC ‘6-Clicks’ Inpatient Daily Activity Short Form  -   Putting on and taking off regular lower body clothing?: None  -   Bathing (including washing, rinsing, drying)?: None  -   Toileting, which includes using toilet, bedpan or urinal? : None  -   Putting on and taking off regular upper body clothing?: None  -   Taking care of personal grooming such as brushing teeth?: None  -   Eating meals?: None    AM-PAC Score:  Score: 24  Approx Degree of Impairment: 0%  Standardized Score (AM-PAC Scale): 57.54      ADDITIONAL TESTS     NEUROLOGICAL FINDINGS        PLAN   Patient has been evaluated and presents with no skilled Occupational Therapy needs at this time.  Patient discharged from Occupational Therapy services.  Please re-order if a new functional limitation presents during this admission.      Patient Evaluation Complexity Level:   Occupational Profile/Medical History LOW - Brief history including review of medical or therapy records    Specific performance deficits impacting engagement in ADL/IADL LOW  1 - 3 performance deficits    Client Assessment/Performance Deficits LOW - No comorbidities nor modifications of tasks    Clinical Decision Making LOW - Analysis of occupational profile, problem-focused assessments, limited treatment options    Overall Complexity LOW     OT Session Time: 30 minutes  Self-Care Home Management: 10 minutes

## 2024-06-04 NOTE — ED INITIAL ASSESSMENT (HPI)
Patient to ER with c/o fall around 1730. Patient states, \"I was making dinner for my wife and I and apparently I fell\". Patient denies any LOC, denies any head injury. Positive blood thinner. Right elbow laceration present.

## 2024-06-04 NOTE — CM/SW NOTE
06/04/24 1700   CM/SW Referral Data   Referral Source Physician   Reason for Referral Discharge planning   Informant Patient;Daughter;EMR;Clinical Staff Member   Medical Hx   Does patient have an established PCP? Yes   Patient Info   Patient's Current Mental Status at Time of Assessment Alert;Oriented   Patient's Home Environment House   Patient lives with Spouse/Significant other   Patient Status Prior to Admission   Independent with ADLs and Mobility Yes   Discharge Needs   Anticipated D/C needs No anticipated discharge needs     HOME SITUATION  Type of Home: House   Home Layout: One level     Lives With: Spouse (pt is primary CG for spouse with dementia)  Drives: Yes  Patient Owned Equipment: Rollator;Cane  Patient Regularly Uses: Reading glasses;Hearing aides     Prior Level of [place] per PT eval: Pt is typically independent with ADLs, ambulates with no AD or a cane as needed, and drives. [facility] and rides 4-5 miles per day on a stationary bike at home.     Pt is a 89 y/o male admitted with syncope and collapse. TIGIST received order for HHRN. TIGIST spoke with Hospitalist PA who stated pt will need a HHRN for wound dressings. HH referral sent in AID[facility]. TIGIST reviewed PT note stating anticipated need is outpatient therapy.     Anticipated therapy need: Home with Outpatient Rehab    TIGIST met with pt and pt's dtr at bedside to discuss discharge planning. Pt's dtr stated pt lives in a ranch home and is the primary caregiver for his spouse with dementia. Discussed HHRN and stated pt must be homebound for  services otherwise pt could have financial liability for services. Pt's dtr stated pt is not homebound and would not qualify for services. TIGIST provided pt's dtr with list of HH agencies and stated if pt utilized HH he may be billed for services. Pt's dtr requested to have instructions for pt's wound dressing, relayed to RN. Pt's dtr stated her siblings have considered hiring a caregiver for pt and  his spouse. SW provided pt's dtr with list of caregiving agencies and contact information for AP. Pt's dtr denied any further needs at this time. SW will continue to remain available.     MARTIN Joaquin  Discharge Planner

## 2024-06-04 NOTE — PLAN OF CARE
Problem: SAFETY ADULT - FALL  Goal: Free from fall injury  Description: INTERVENTIONS:  - Assess pt frequently for physical needs  - Identify cognitive and physical deficits and behaviors that affect risk of falls.  - [place] fall precautions as indicated by assessment.  - Educate pt/family on patient safety including physical limitations  - Instruct pt to call for assistance with activity based on assessment  - Modify environment to reduce risk of injury  - Provide assistive devices as appropriate  - Consider OT/PT consult to assist with strengthening/mobility  - Encourage toileting schedule  Outcome: Progressing      4 = No assist / stand by assistance

## 2024-06-04 NOTE — PHYSICAL THERAPY NOTE
PHYSICAL THERAPY EVALUATION - INPATIENT     Room Number: 3601/3601-A  Evaluation Date: 6/4/2024  Type of Evaluation: Initial  Physician Order: PT Eval and Treat    Presenting Problem: syncope and collapse, R elbow laceration  Co-Morbidities : MI, stroke, PE, HTN, cardiac stents  Reason for Therapy: Mobility Dysfunction and Discharge Planning    PHYSICAL THERAPY ASSESSMENT   Patient is a 89 year old male admitted 6/3/2024 for syncope and collapse, R elbow laceration.   Patient is currently functioning near baseline with bed mobility, transfers, and gait. Prior to admission, patient's baseline is independent with no AD or mod ind with a cane.     Patient will benefit from continued skilled PT Services at discharge to promote functional independence in home.  Anticipate patient will return home with OP PT.    PLAN  Patient has been evaluated and presents with no skilled Physical Therapy needs at this time.  Patient discharged from Physical Therapy services.  Please re-order if a new functional limitation presents during this admission.    GOALS  Patient was able to achieve the following goals ...    Patient was able to transfer Safely and independently    Patient able to ambulate on level surfaces Safely with supervision         HOME SITUATION  Type of Home: House   Home Layout: One level                Lives With: Spouse (pt is primary CG for spouse with dementia)  Drives: Yes  Patient Owned Equipment: Rollator;Cane  Patient Regularly Uses: Reading glasses;Hearing aides    Prior Level of [place]: Pt is typically independent with ADLs, ambulates with no AD or a cane as needed, and drives. [facility] and rides 4-5 miles per day on a stationary bike at home.     SUBJECTIVE  \"It's not good laying in the bed all that time\"       OBJECTIVE  Precautions: Bed/chair alarm;Hard of hearing  Fall Risk: Standard fall risk    WEIGHT BEARING RESTRICTION  Weight Bearing Restriction: None                PAIN  ASSESSMENT  Rating: Unable to rate  Location: RUE  Management Techniques: Activity promotion;Relaxation;Repositioning    COGNITION  Overall Cognitive Status:  WFL - within functional limits    RANGE OF MOTION AND STRENGTH ASSESSMENT  Upper extremity ROM and strength are within functional limits     Lower extremity ROM is within functional limits     Lower extremity strength is within functional limits       BALANCE  Static Sitting: Good  Dynamic Sitting: Good  Static Standing: Fair +  Dynamic Standing: Fair +    ADDITIONAL TESTS                                    ACTIVITY TOLERANCE   BP stable in supine, sitting, and standing. Pt denies dizziness.                       O2 WALK       NEUROLOGICAL FINDINGS                        AM-PAC '6-Clicks' INPATIENT SHORT FORM - BASIC MOBILITY  How much difficulty does the patient currently have...  Patient Difficulty: Turning over in bed (including adjusting bedclothes, sheets and blankets)?: None   Patient Difficulty: Sitting down on and standing up from a chair with arms (e.g., wheelchair, bedside commode, etc.): None   Patient Difficulty: Moving from lying on back to sitting on the side of the bed?: None   How much help from another person does the patient currently need...   Help from Another: Moving to and from a bed to a chair (including a wheelchair)?: A Little   Help from Another: Need to walk in hospital room?: A Little   Help from Another: Climbing 3-5 steps with a railing?: A Little       AM-PAC Score:  Raw Score: 21   Approx Degree of Impairment: 28.97%   Standardized Score (AM-PAC Scale): 50.25   CMS Modifier (G-Code): CJ    FUNCTIONAL ABILITY STATUS  Gait Assessment   Functional Mobility/Gait Assessment  Gait Assistance: Supervision  Distance (ft): 250  Assistive Device: None  Pattern: Within Functional Limits    Skilled Therapy Provided: Per RN okay to work with pt. Pt received in supine and was agreeable to PT session.     Bed Mobility:  Rolling: NT  Supine to  sit: independent    Sit to supine: independent      Transfer Mobility:  Sit to stand: independent    Stand to sit: independent   Gait = Pt ambulated with no AD and supervision. No LOB noted.     Therapist's comments:Pt educated on role of therapy, goals for session, safety, fall prevention, and activity recommendations.     Exercise/Education Provided:  Bed mobility  Energy conservation  Functional activity tolerated  Gait training  Posture  Strengthening  Transfer training    Patient End of Session: Up in chair;Needs met;Call light within reach;RN aware of session/findings;All patient questions and concerns addressed;Alarm set;Family present    Patient Evaluation Complexity Level:  History Moderate - 1 or 2 personal factors and/or co-morbidities   Examination of body systems Low - addressing 1-2 elements   Clinical Presentation Low - Stable   Clinical Decision Making Low Complexity       PT Session Time: 25 minutes  Gait Trainin minutes

## 2024-06-04 NOTE — PROGRESS NOTES
NURSING DISCHARGE NOTE    Discharge AVS reviewed with patient and his daughter at bedside.  Wound care reviewed with daughter and instructions are in AVS,  Supplies provided.  Peripheral IV and tele monitoring removed.  All patient belongings accounted for and sent home with the patient.  He was taken down to the lobby via wheelchair where his daughter was waiting to drive him home.

## 2024-06-04 NOTE — DISCHARGE SUMMARY
[facility]     Beba Mijares Patient Status:  Inpatient    1926 MRN [id number]   Location [facility] 3NE-A Attending Clifford Peterson, DO   Hosp Day # 0 PCP CLARK HIGGINS     Date of Admission: 6/3/2024  Date of Discharge:   2024    Discharge Disposition: Home or Self Care    Discharge Diagnosis:  Syncope and collapse with fall recurrent  Left forearm laceration requiring 2 sutures  CAD prior stenting remote  Aortic insufficiency      History of Present Illness:     Beba Mijares is a 89 year old male with past medical history of CAD presents following a syncopal episode     Patient was at home with his wife, he had a syncopal episode.  He had a similar episode earlier this year.  He only complains of pain in his right arm where he fell, he was found to have a lacerated elbow which was sutured in the emergency department.  He is denying chest pain or difficulty breathing.  He denies pain anywhere else.  No recent illness.    Brief Synopsis:   89-year-old who had syncopal episode earlier this year presented after falling again after having another syncopal episode.  Fortunately no fractures but did have a right forearm laceration that required several sutures.  Patient was seen by cardiology echocardiogram was done showed a preserved EF no wall motion abnormality.  Noted to have bradycardia especially while sleeping cardiology recommended a 30-day MCT monitor at discharge.  But there is no indication for pacemaker and avoid any AV reagan blocking agents.  Patient was seen by PT OT he is at his baseline recommend home.  Change the dressing right forearm recommendable Neosporin and Vaseline gauze and covered keep it dry for several days.  Will need sutures removed next week at PCPs office discussed with his daughter he has been cleared for discharge home today.    Lace+ Score: 65  59-90 High Risk  29-58 Medium Risk  0-28   Low Risk       TCM Follow-Up Recommendation:  LACE  29-58: Moderate Risk of readmission after discharge from the hospital.  **Certification    Admission date was 6/3/2024.  Inpatient stay was shorter than expected.  Patient's Syncope and collapse was initially serious enough to expect a more lengthy hospitalization but patient improved faster than expected.                 Procedures during hospitalization:   Echocardiogram preserved EF  CT of the head no acute intracranial abnormality  Chest x-ray negative for cardiopulmonary abnormality    Incidental or significant findings and recommendations (brief descriptions):  30-day MCT event monitor  Daily right forearm dressings with Neosporin Vaseline gauze and cover dry for several days then okay to shower needs sutures removed next week with PCP  Follow-up with cardiology  Hopefully social work can get a home health nurse to assess the wound.  Follow-up with cardiology PCP    Lab/Test results pending at Discharge:   None    Consultants:  Cardiology PT OT social work    Discharge Medication List:     Discharge Medications        CONTINUE taking these medications        Instructions Prescription details   aspirin 81 MG Chew      Chew 1 tablet (81 mg total) by mouth daily.   Refills: 0     isosorbide mononitrate ER 30 MG Tb24  Commonly known as: Imdur      Take 1 tablet (30 mg total) by mouth daily.   Refills: 0     lisinopril 10 MG Tabs  Commonly known as: Zestril      Take 1 tablet (10 mg total) by mouth 2 (two) times daily.   Refills: 0     multivitamin Tabs      Take 1 tablet by mouth at bedtime.   Refills: 0     Nitrostat 0.4 MG Subl  Generic drug: nitroglycerin      nitroGLYcerin (NITROSTAT) 0.4 MG sublingual tablet, [RxNorm: 049746]   Refills: 0     pantoprazole 40 MG Tbec  Commonly known as: Protonix      Take 1 tablet (40 mg total) by mouth 2 (two) times daily before meals.   Refills: 0     rosuvastatin 10 MG Tabs  Commonly known as: Crestor      Take 1 tablet (10 mg total) by mouth daily.   Refills: 0             STOP taking these medications      clopidogrel 75 MG Tabs  Commonly known as: Plavix        olopatadine 0.1 % Soln  Commonly known as: Patanol        raNITIdine 15 MG/ML Syrp  Commonly known as: Zantac                 [place] reviewed: Reviewed    Follow-up appointment:   [address]  [address]  [facility] [address]  [phone]        [place] [place]  [address]  [address]  [facility] [address]  [phone]    Follow up in 1 week(s)      Appointments for Next 30 Days 2024 - 2024      None            Vital signs:  Temp:  [98.1 °F (36.7 °C)-98.7 °F (37.1 °C)] 98.6 °F (37 °C)  Pulse:  [57-72] 68  Resp:  [16-20] 18  BP: (109-152)/(70-91) 121/81  SpO2:  [94 %-96 %] 96 %    Physical Exam:    -----------------------------------------------------------------------------------------------  PATIENT DISCHARGE INSTRUCTIONS: See electronic chart    Roma Abdullahi NP      Addendum:    Agree with above note except as documented below.  He feels back to his normal self. Has no complaints.     Gen: NAD, awake and alert  CVS: s1s2, RRR  Resp: Clear bilaterally  Abd: soft, NT, ND  Ext: No edema    #Recurrent syncope  #CAD s/p stent  #Left forearm laceration  #Aortic insufficiency  #Hypertension    -Orthostatics negative  -Telemetry  -PT/OT evals appreciated  -Cards recs appreciated  -TTE unremarkable  -MCT on discharge    Pt seen and examined independently. Chart reviewed. Labs and imaging over the last 24 hours have been personally reviewed.  I personally made/approved 100% of the management plan for this patient and take full responsibility for the patient management.   Note has been reviewed by me and modified as needed.  Exam and Impression/ Recs as noted above.  D/w staff.    Clifford Peterson,               Total time spent on discharge plannin minutes     The  [place] Cures Act makes medical notes like these available to patients in the interest of transparency. Please be  advised this is a medical document. Medical documents are intended to carry relevant information, facts as evident, and the clinical opinion of the practitioner. The medical note is intended as peer to peer communication and may appear blunt or direct. It is written in medical language and may contain abbreviations or verbiage that are unfamiliar.

## 2024-06-04 NOTE — ED PROVIDER NOTES
Patient Seen in: [facility] In [place]      History     Chief Complaint   Patient presents with    Laceration/Abrasion     Stated Complaint: Fall - right arm lac - denies head injury - right elbow pain    Subjective:   HPI    89-year-old male.  Medical history of previous heart attack, 3 previous stents,, hypertension, hyperlipidemia, previous DVT and PE, previous stroke.  Currently anticoagulated.  Patient lives with his wife.  She has advanced dementia and he is her primary caregiver.  This evening, he was making dinner and possibly had a syncopal episode.  He is unsure what happened but awoke on the floor.  He has a wound to his right  forearm region.    Patient arrives with his daughter.  She informs me that the patient has had 2 other similar falls in the last 6 months.  This has been evaluated by his primary care physician.  Patient underwent a echocardiogram at the beginning of the year.  She is concerned that he does not check his blood pressure as often as he should and might possibly be overmedicated.    Patient denies any recent abnormal health, fever or chills, cough or cold symptoms.  No urinary complaints.  No diarrhea.  No chest pain or shortness of breath.    Objective:   Past Medical History:    Atherosclerosis of coronary artery    Stents 3    BLOOD CLOTS    Colitis    Deaf    Heart attack (HCC)    NSTEMI    High blood pressure    High cholesterol    Kidney stone    Pulmonary embolism (HCC)    DVT & PE POst  Fractured Metatarsil    Stroke (HCC)              Past Surgical History:   Procedure Laterality Date    Angiogram      Angioplasty (coronary)  9/21/16      Stents LAD, CIRC, RCA    Cath cartotid stent      Cath drug eluting stent      Prostate biopsies                  Social History     Socioeconomic History    Marital status:    Tobacco Use    Smoking status: Former     Types: Cigarettes   Vaping Use    Vaping status: Never Used   Substance and Sexual Activity     Alcohol use: Not Currently     Comment: occ    Drug use: No     Social Determinants of Health     Physical Activity: Sufficiently Active (6/26/2020)    [facility]    Exercise Vital Sign     Days of Exercise per Week: 7 days     Minutes of Exercise per Session: 60 min    [facility], [facility]    Social Connections    Received from [facility], [facility]    Housing Stability              Review of Systems    Positive for stated complaint: Fall - right arm lac - denies head injury - right elbow pain  Other systems are as noted in HPI.  Constitutional and vital signs reviewed.      All other systems reviewed and negative except as noted above.    Physical Exam     ED Triage Vitals   BP 06/03/24 2218 136/72   Pulse 06/03/24 2218 58   Resp 06/03/24 2218 18   Temp 06/03/24 2218 98.1 °F (36.7 °C)   Temp src 06/03/24 2218 Oral   SpO2 06/03/24 2218 94 %   O2 Device 06/03/24 2345 None (Room air)       Current Vitals:   Vital Signs  BP: 109/70  Pulse: 62  Resp: 17  Temp: 98.1 °F (36.7 °C)  Temp src: Oral    Oxygen Therapy  SpO2: 96 %  O2 Device: None (Room air)            Physical Exam    Gen: Well appearing, well groomed, alert and aware x 3  Neck: Supple, full range of motion, no thyromegaly or lymphadenopathy.  Eye examination: EOMs are intact, normal conjunctival  ENT: No injection noted to the bilateral auditory canals; no loss of landmarks. Normal nasal mucosa without audible nasal congestion.  Oropharynx is patent without evidence of erythema, exudates or deviation.  No stridor to auscultation  Lung: No distress, RR, no retraction, breath sounds are clear bilaterally  Cardio: Regular rate and rhythm, normal S1-S2, no murmur appreciable  Skin:   Large skin tear to the right dorsal forearm.  Distal aspect transitions into a 1.5 cm laceration    ED Course     Labs Reviewed   COMP  METABOLIC PANEL (14) - Abnormal; Notable for the following components:       Result Value    Glucose 116 (*)     Albumin 3.3 (*)     A/G Ratio 0.9 (*)     All other components within normal limits   TROPONIN I HIGH SENSITIVITY - Normal   MAGNESIUM - Normal   CBC WITH DIFFERENTIAL WITH PLATELET    Narrative:     The following orders were created for panel order CBC With Differential With Platelet.  Procedure                               Abnormality         Status                     ---------                               -----------         ------                     CBC W/ DIFFERENTIAL[[id number]]                              Final result                 Please view results for these tests on the individual orders.   URINALYSIS, ROUTINE   CBC W/ DIFFERENTIAL     EKG    Rate, intervals and axes as noted on EKG Report.  Rate: 58  Rhythm: Sinus bradycardia  Reading:  right bundle branch block.  Evidence of previous inferior infarct.  No acute ischemic changes noted            XR CHEST AP PORTABLE  (CPT=71045)    Result Date: 6/3/2024  PROCEDURE:  XR CHEST AP PORTABLE  (CPT=71045)  TECHNIQUE:  AP chest radiograph was obtained.  COMPARISON:  [facility], XR, XR CHEST AP PORTABLE  (CPT=71010), 9/18/2016, 12:40 PM.  INDICATIONS:  Fall - right arm lac - denies head injury - right elbow pain  PATIENT STATED HISTORY: (As transcribed by Technologist)  Patient has fallen 06/03/2024 onto his right arm, right arm has lacerations. Ruling out any possible chest abnormalities.    FINDINGS:  The cardiomediastinal silhouette is within normal limits.  Chronic interstitial changes are noted in the lungs.  There is no consolidation, effusion, or pneumothorax.  No aggressive osseous lesions are identified.            CONCLUSION: No acute cardiopulmonary abnormality.   LOCATION:  [facility]      Dictated by (CST): Nicholas Veronica MD on 6/03/2024 at 11:36 PM     Finalized by (CST): Nicholas Veronica MD on 6/03/2024 at 11:36 PM       CT BRAIN OR HEAD  ([id number])    Result Date: 6/3/2024  PROCEDURE:  CT BRAIN OR HEAD (45930)  COMPARISON:  [facility], CT, CT BRAIN OR HEAD ([id number]), 3/29/2024, 12:42 PM.  INDICATIONS:  Fall - right arm lac - denies head injury - right elbow pain  TECHNIQUE:  Noncontrast CT scanning is performed through the brain. Dose reduction techniques were used. Dose information is transmitted to the ACR (American College of Radiology) NRDR (National Radiology Data Registry) which includes the Dose Index Registry.  PATIENT STATED HISTORY: (As transcribed by Technologist)  S/P fall with arm pain..  Head trauma.    FINDINGS: Diffuse cerebral volume loss is noted.  Ventricles are within normal limits.  There is no midline shift or mass-effect.  The basal cisterns are patent.  The gray-white matter differentiation is intact.  There is no acute intracranial hemorrhage or extra-axial fluid collection.  Severe hypodensities in the periventricular subcortical white matter is compatible chronic small vessel disease.  Encephalomalacia in the left frontal and parietal lobes is noted.  Incidental calcified meningioma along the falx is noted measuring up to 0.9 x 0.5 cm (image 48).  There is no evident fracture.  The visualized paranasal sinuses and mastoid air cells are unremarkable.             CONCLUSION:  No acute intracranial abnormality    LOCATION:  [facility]   Dictated by (CST): Nicholas Veronica MD on 6/03/2024 at 11:30 PM     Finalized by (CST): Nicholas Veronica MD on 6/03/2024 at 11:31 PM                MDM        Admission disposition: 6/4/2024 12:13 AM           I involved my supervising physician    CT of the brain.  Chest x-ray.  EKG.  CBC, CMP, troponin, magnesium level.    We had a discussion with the family concerning the multiple recent falls and the family's current living situation.  We recommend admission for further investigation and consideration of placement.  Family is agreeable.    Large skin tear to the right dorsal forearm.  The more distal  aspect transitions into a  laceration.        CT of the brain and chest x-ray demonstrate no acute abnormalities    CBC demonstrates no leukocytosis.  Magnesium levels within normal limits.  Urinalysis benign.  Troponin negative         CMP demonstrates a glucose of 116.  Albumin of 3.3     To the right forearm, the proximal aspect of the wound is a large skin tear with the majority of the skin intact.  This was thoroughly irrigated and the skin was reapproximated.  Multiple Steri-Strips were then applied.  Just distal to this there is a area of skin avulsion.  Gelfoam was applied to this region.  Just distal to this, there is a 1.5 cm laceration.  This site was anesthetized with plain lidocaine.  It was sterilized.  Using 4-0 nylon, 2 simple interrupted sutures were placed.  Nonadherent was applied to the entirety of the wound and Ace wrap applied.    We will proceed with admission       Medical Decision Making      Disposition and Plan     Clinical Impression:  1. Syncope and collapse    2. Arm laceration, right, initial encounter         Disposition:  Admit  6/4/2024 12:13 am    Follow-up:  No follow-up provider specified.        Medications Prescribed:  Current Discharge Medication List                            Hospital Problems       Present on Admission  Date Reviewed: 4/9/2024            ICD-10-CM Noted POA    * (Principal) Syncope and collapse R55 6/4/2024 Unknown

## 2024-06-04 NOTE — ED QUICK NOTES
Orders for admission, patient is aware of plan and ready to go upstairs. Any questions, please call ED RN Leeann at extension [phone].     Patient Covid vaccination status: Fully vaccinated     COVID Test Ordered in ED: None    COVID Suspicion at Admission: N/A    Running Infusions:  None    Mental Status/LOC at time of transport: Alert,oriented, and pleasant. Patient was making dinner for wife and passed out. Fall to kitchen floor causing injury to right arm requiring repair. Dressing in place. No c/o pain or discomfort. This is the 3rd fall with injury since November.     Other pertinent information:   CIWA score: N/A   NIH score:  N/A

## 2024-06-04 NOTE — PLAN OF CARE
Pt AOX4, room air, VSS, NSR.    Cardiology consulted, echo planned for today  Patient up with stand by assist, PT/OT to see today  Discharge planning to home with 30 day event monitoring pending echo  Daughter at bedside, updated on plan of care.  Continue to monitor.

## 2024-06-04 NOTE — DISCHARGE INSTRUCTIONS
Neosporin r forearm on sutures  then cover Vaseline gauze and cover to protect the skin- avoid tape to prevent further skin damage   See PCP  next week for suture removal   change drsg daily or as needed. Keep it dry for a few days     For assistance in finding in home care please call:  Toma at [facility] [phone] Email [email or web address]  [facility] [phone]  [facility]  [phone]

## 2024-06-05 NOTE — PAYOR COMM NOTE
--------------  DISCHARGE REVIEW    Payor: [employer]  Subscriber #:  [id number]  Authorization Number: [id number]    Admit date: 24  Admit time:   2:12 AM  Discharge Date: 2024  6:12 PM     Admitting Physician: Eric Steven MD  Attending Physician:  No att. providers found  Primary Care Physician: Ashish Higgins          Discharge Summary Notes        Discharge Summary signed by Clifford Peterson DO at 2024  5:56 PM       Author: Clifford Peterson DO Specialty: HOSPITALIST, Internal Medicine Author Type: Physician    Filed: 2024  5:56 PM Date of Service: 2024  4:11 PM Status: Signed    : Clifford Peterson DO (Physician)    Related Notes: Original Note by Roma Abdullahi NP (Nurse Practitioner) filed at 2024  4:19 PM           [facility]     Beba Mijares Patient Status:  Inpatient    1926 MRN [id number]   [facility] 3NE-A Attending Clifford Peterson DO   Hosp Day # 0 PCP ASHISH HIGGINS     Date of Admission: 6/3/2024  Date of Discharge:   2024    Discharge Disposition: Home or Self Care    Discharge Diagnosis:  Syncope and collapse with fall recurrent  Left forearm laceration requiring 2 sutures  CAD prior stenting remote  Aortic insufficiency      History of Present Illness:     Beba Mijares is a 89 year old male with past medical history of CAD presents following a syncopal episode     Patient was at home with his wife, he had a syncopal episode.  He had a similar episode earlier this year.  He only complains of pain in his right arm where he fell, he was found to have a lacerated elbow which was sutured in the emergency department.  He is denying chest pain or difficulty breathing.  He denies pain anywhere else.  No recent illness.    Brief Synopsis:   89-year-old who had syncopal episode earlier this year presented after falling again after having another syncopal episode.  Fortunately no fractures but did have a right  forearm laceration that required several sutures.  Patient was seen by cardiology echocardiogram was done showed a preserved EF no wall motion abnormality.  Noted to have bradycardia especially while sleeping cardiology recommended a 30-day MCT monitor at discharge.  But there is no indication for pacemaker and avoid any AV reagan blocking agents.  Patient was seen by PT OT he is at his baseline recommend home.  Change the dressing right forearm recommendable Neosporin and Vaseline gauze and covered keep it dry for several days.  Will need sutures removed next week at PCPs office discussed with his daughter he has been cleared for discharge home today.    Lace+ Score: 65  59-90 High Risk  29-58 Medium Risk  0-28   Low Risk       TCM Follow-Up Recommendation:  LACE 29-58: Moderate Risk of readmission after discharge from the hospital.  **Certification    Admission date was 6/3/2024.  Inpatient stay was shorter than expected.  Patient's Syncope and collapse was initially serious enough to expect a more lengthy hospitalization but patient improved faster than expected.                 Procedures during hospitalization:   Echocardiogram preserved EF  CT of the head no acute intracranial abnormality  Chest x-ray negative for cardiopulmonary abnormality    Incidental or significant findings and recommendations (brief descriptions):  30-day MCT event monitor  Daily right forearm dressings with Neosporin Vaseline gauze and cover dry for several days then okay to shower needs sutures removed next week with PCP  Follow-up with cardiology  Hopefully social work can get a home health nurse to assess the wound.  Follow-up with cardiology PCP    Lab/Test results pending at Discharge:   None    Consultants:  Cardiology PT OT social work    Discharge Medication List:     Discharge Medications        CONTINUE taking these medications        Instructions Prescription details   aspirin 81 MG Chew      Chew 1 tablet (81 mg total) by  mouth daily.   Refills: 0     isosorbide mononitrate ER 30 MG Tb24  Commonly known as: Imdur      Take 1 tablet (30 mg total) by mouth daily.   Refills: 0     lisinopril 10 MG Tabs  Commonly known as: Zestril      Take 1 tablet (10 mg total) by mouth 2 (two) times daily.   Refills: 0     multivitamin Tabs      Take 1 tablet by mouth at bedtime.   Refills: 0     Nitrostat 0.4 MG Subl  Generic drug: nitroglycerin      nitroGLYcerin (NITROSTAT) 0.4 MG sublingual tablet, [RxNorm: 602351]   Refills: 0     pantoprazole 40 MG Tbec  Commonly known as: Protonix      Take 1 tablet (40 mg total) by mouth 2 (two) times daily before meals.   Refills: 0     rosuvastatin 10 MG Tabs  Commonly known as: Crestor      Take 1 tablet (10 mg total) by mouth daily.   Refills: 0            STOP taking these medications      clopidogrel 75 MG Tabs  Commonly known as: Plavix        olopatadine 0.1 % Soln  Commonly known as: Patanol        raNITIdine 15 MG/ML Syrp  Commonly known as: Zantac                 [place] reviewed: Reviewed    Follow-up appointment:   [address] [id number] [phone]        [facility] Mills  [address] [place] [address] [id number] [phone]    Follow up in 1 week(s)      Appointments for Next 30 Days 6/4/2024 - 7/4/2024      None            Vital signs:  Temp:  [98.1 °F (36.7 °C)-98.7 °F (37.1 °C)] 98.6 °F (37 °C)  Pulse:  [57-72] 68  Resp:  [16-20] 18  BP: (109-152)/(70-91) 121/81  SpO2:  [94 %-96 %] 96 %    Physical Exam:    -----------------------------------------------------------------------------------------------  PATIENT DISCHARGE INSTRUCTIONS: See electronic chart    Roma Abdullahi NP      Addendum:    Agree with above note except as documented below.  He feels back to his normal self. Has no complaints.     Gen: NAD, awake and alert  CVS: s1s2, RRR  Resp: Clear bilaterally  Abd: soft, NT, ND  Ext: No edema    #Recurrent syncope  #CAD s/p  stent  #Left forearm laceration  #Aortic insufficiency  #Hypertension    -Orthostatics negative  -Telemetry  -PT/OT evals appreciated  -Cards recs appreciated  -TTE unremarkable  -MCT on discharge    Pt seen and examined independently. Chart reviewed. Labs and imaging over the last 24 hours have been personally reviewed.  I personally made/approved 100% of the management plan for this patient and take full responsibility for the patient management.   Note has been reviewed by me and modified as needed.  Exam and Impression/ Recs as noted above.  D/w staff.    Clifford Peterson DO              Total time spent on discharge plannin minutes     The  [employer] Cures Act makes medical notes like these available to patients in the interest of transparency. Please be advised this is a medical document. Medical documents are intended to carry relevant information, facts as evident, and the clinical opinion of the practitioner. The medical note is intended as peer to peer communication and may appear blunt or direct. It is written in medical language and may contain abbreviations or verbiage that are unfamiliar.       Electronically signed by Clifford Peterson DO on 2024  5:56 PM         REVIEWER COMMENTS

## 2024-06-05 NOTE — PAYOR COMM NOTE
--------------  ADMISSION REVIEW     Payor: [employer]  Subscriber #:  [id number]  Authorization Number: [id number]    Admit date: 6/4/24  Admit time:  2:12 AM       REVIEW DOCUMENTATION:    Patient Seen in: [facility] [place]      History     Chief Complaint   Patient presents with    Laceration/Abrasion     Stated Complaint: Fall - right arm lac - denies head injury - right elbow pain    Subjective:   HPI    89-year-old male.  Medical history of previous heart attack, 3 previous stents,, hypertension, hyperlipidemia, previous DVT and PE, previous stroke.  Currently anticoagulated.  Patient lives with his wife.  She has advanced dementia and he is her primary caregiver.  This evening, he was making dinner and possibly had a syncopal episode.  He is unsure what happened but awoke on the floor.  He has a wound to his right  forearm region.    Patient arrives with his daughter.  She informs me that the patient has had 2 other similar falls in the last 6 months.  This has been evaluated by his primary care physician.  Patient underwent a echocardiogram at the beginning of the year.  She is concerned that he does not check his blood pressure as often as he should and might possibly be overmedicated.    Patient denies any recent abnormal health, fever or chills, cough or cold symptoms.  No urinary complaints.  No diarrhea.  No chest pain or shortness of breath.    Objective:   Past Medical History:    Atherosclerosis of coronary artery    Stents 3    BLOOD CLOTS    Colitis    Deaf    Heart attack (HCC)    NSTEMI    High blood pressure    High cholesterol    Kidney stone    Pulmonary embolism (HCC)    DVT & PE POst  Fractured Metatarsil    Stroke (HCC)       Past Surgical History:   Procedure Laterality Date    Angiogram      Angioplasty (coronary)  9/21/16      Stents LAD, CIRC, RCA    Cath cartotid stent      Cath drug eluting stent      Prostate biopsies         Physical Exam      ED Triage Vitals   BP 06/03/24 2218 136/72   Pulse 06/03/24 2218 58   Resp 06/03/24 2218 18   Temp 06/03/24 2218 98.1 °F (36.7 °C)   Temp src 06/03/24 2218 Oral   SpO2 06/03/24 2218 94 %   O2 Device 06/03/24 2345 None (Room air)       Current Vitals:   Vital Signs  BP: 109/70  Pulse: 62  Resp: 17  Temp: 98.1 °F (36.7 °C)  Temp src: Oral    Oxygen Therapy  SpO2: 96 %  O2 Device: None (Room air)    Physical Exam    Gen: Well appearing, well groomed, alert and aware x 3  Neck: Supple, full range of motion, no thyromegaly or lymphadenopathy.  Eye examination: EOMs are intact, normal conjunctival  ENT: No injection noted to the bilateral auditory canals; no loss of landmarks. Normal nasal mucosa without audible nasal congestion.  Oropharynx is patent without evidence of erythema, exudates or deviation.  No stridor to auscultation  Lung: No distress, RR, no retraction, breath sounds are clear bilaterally  Cardio: Regular rate and rhythm, normal S1-S2, no murmur appreciable  Skin:   Large skin tear to the right dorsal forearm.  Distal aspect transitions into a 1.5 cm laceration    ED Course     Labs Reviewed   COMP METABOLIC PANEL (14) - Abnormal; Notable for the following components:       Result Value    Glucose 116 (*)     Albumin 3.3 (*)     A/G Ratio 0.9 (*)     All other components within normal limits   TROPONIN I HIGH SENSITIVITY - Normal   MAGNESIUM - Normal     EKG    Rate, intervals and axes as noted on EKG Report.  Rate: 58  Rhythm: Sinus bradycardia  Reading:  right bundle branch block.  Evidence of previous inferior infarct.  No acute ischemic changes noted       XR CHEST AP PORTABLE  (CPT=71045)    Result Date: 6/3/2024  PROCEDURE:  XR CHEST AP PORTABLE CONCLUSION: No acute cardiopulmonary abnormality.   LOCATION:  [facility]      Dictated by (CST): Nicholas Veronica MD on 6/03/2024 at 11:36 PM     Finalized by (CST): Nicholas Veronica MD on 6/03/2024 at 11:36 PM       CT BRAIN OR HEAD (49071)    Result Date:  6/3/2024  PROCEDURE:  CT BRAIN OR HEAD   CONCLUSION:  No acute intracranial abnormality    LOCATION:  [facility]   Dictated by (CST): Nicholas Veronica MD on 6/03/2024 at 11:30 PM     Finalized by (CST): Nicholas Veronica MD on 6/03/2024 at 11:31 PM          Disposition and Plan     Clinical Impression:  1. Syncope and collapse    2. Arm laceration, right, initial encounter         Disposition:  Admit  6/4/2024 12:13 am      History and Physical     Assessment & Plan:  #Recurrent Syncopal episodes   -telemetry  -orthostatic vital signs  -Cardiology consulted     #CAD  -ASA/Imdur/Statin     #HTN  -Lisinopril     #GERD  -PPI       Cardiology Consultation     Reason for Consultation:  Syncope     History of Present Illness:  Beba Mijares is a a(n) 89 year old male with hx of CAD with prior stenting who presents with witnessed syncope.  He was preparing supper for his wife, who has dementia.  Last thing he remembers was being in living room.  No confusion after her regained his consciousness.  Denies CP, palpitations or heart failure symptoms.  No seizure like activity.  No loss of urine or bowel continence.  Similar episode earlier this year.    CT brain - no acute intracranial abnormallity     Impression:  Principal Problem:    Syncope and collapse  Active Problems:    Arm laceration, right, initial encounter     Syncope  CAD with prior stenting  Aortic insufficiency - moderate on recent echo     Recommendations:  - tele monitoring while in hospital, 30 day MCT at discharge  - limited echo  - no indication for PPM at present  - hold AV reagan blocking agents with bradycardia at night - ventricular rates in high 40's          MEDICATIONS ADMINISTERED IN LAST 1 DAY:    Vitals (last day) before discharge       Date/Time Temp Pulse Resp BP SpO2 Weight O2 Device O2 Flow Rate (L/min) [facility]    06/04/24 1145 98.6 °F (37 °C) 68 18 121/81 96 % -- None (Room air) 0 L/min ND    06/04/24 0745 98.5 °F (36.9 °C) 57 16 122/90 94 % -- None (Room  air) 0 L/min ND    06/04/24 0500 98.7 °F (37.1 °C) 59 16 122/88 96 % -- None (Room air) -- CC    06/04/24 0224 -- 68 -- 152/91 -- -- -- -- SB    06/04/24 0222 -- 72 -- 128/83 95 % -- -- -- SB    06/04/24 0221 -- 71 18 140/80 96 % 142 lb 3.2 oz None (Room air) -- SB    06/04/24 0140 -- -- -- -- -- -- None (Room air) -- CW    06/04/24 0044 -- 70 20 144/87 -- -- None (Room air) -- CW    06/03/24 2345 -- 62 17 109/70 96 % -- None (Room air) -- CW    06/03/24 2218 98.1 °F (36.7 °C) 58 18 136/72 94 % 140 lb -- -- EB

## 2024-06-27 ENCOUNTER — HOSPITAL ENCOUNTER (OUTPATIENT)
Dept: CV DIAGNOSTICS | Age: 89
Discharge: HOME | End: 2024-06-27
Attending: INTERNAL MEDICINE
Payer: MEDICARE

## 2024-06-27 DIAGNOSIS — Z95.5 S/P CORONARY ARTERY STENT PLACEMENT: ICD-10-CM

## 2024-06-27 DIAGNOSIS — R00.1 BRADYCARDIA: ICD-10-CM

## 2024-06-27 DIAGNOSIS — I25.10 CORONARY ATHEROSCLEROSIS OF NATIVE CORONARY ARTERY: ICD-10-CM

## 2024-06-27 DIAGNOSIS — I10 ESSENTIAL HYPERTENSION, BENIGN: ICD-10-CM

## 2024-06-27 PROCEDURE — 93306 TTE W/DOPPLER COMPLETE: CPT | Performed by: INTERNAL MEDICINE

## 2025-02-15 ENCOUNTER — APPOINTMENT (OUTPATIENT)
Dept: GENERAL RADIOLOGY | Age: OVER 89
End: 2025-02-15
Attending: EMERGENCY MEDICINE
Payer: MEDICARE

## 2025-02-15 ENCOUNTER — HOSPITAL ENCOUNTER (INPATIENT)
Facility: HOSPITAL | Age: OVER 89
LOS: 6 days | Discharge: HOME | DRG: 393 | End: 2025-02-22
Attending: EMERGENCY MEDICINE | Admitting: HOSPITALIST
Payer: MEDICARE

## 2025-02-15 ENCOUNTER — APPOINTMENT (OUTPATIENT)
Dept: GENERAL RADIOLOGY | Age: OVER 89
DRG: 393 | End: 2025-02-15
Attending: EMERGENCY MEDICINE
Payer: MEDICARE

## 2025-02-15 ENCOUNTER — APPOINTMENT (OUTPATIENT)
Dept: CT IMAGING | Age: OVER 89
DRG: 393 | End: 2025-02-15
Attending: EMERGENCY MEDICINE
Payer: MEDICARE

## 2025-02-15 ENCOUNTER — APPOINTMENT (OUTPATIENT)
Dept: CT IMAGING | Age: OVER 89
End: 2025-02-15
Attending: EMERGENCY MEDICINE
Payer: MEDICARE

## 2025-02-15 ENCOUNTER — HOSPITAL ENCOUNTER (INPATIENT)
Facility: HOSPITAL | Age: OVER 89
LOS: 6 days | Discharge: HOME | End: 2025-02-22
Attending: EMERGENCY MEDICINE | Admitting: HOSPITALIST
Payer: MEDICARE

## 2025-02-15 DIAGNOSIS — E86.0 DEHYDRATION: ICD-10-CM

## 2025-02-15 DIAGNOSIS — R11.2 NAUSEA AND VOMITING, UNSPECIFIED VOMITING TYPE: Primary | ICD-10-CM

## 2025-02-15 LAB
ALBUMIN SERPL-MCNC: 4.2 G/DL (ref 3.2–4.8)
ALBUMIN/GLOB SERPL: 1.3 {RATIO} (ref 1–2)
ALP LIVER SERPL-CCNC: 58 U/L
ALT SERPL-CCNC: 14 U/L
ANION GAP SERPL CALC-SCNC: 10 MMOL/L (ref 0–18)
AST SERPL-CCNC: 26 U/L (ref ?–34)
BASOPHILS # BLD AUTO: 0.06 X10(3) UL (ref 0–0.2)
BASOPHILS NFR BLD AUTO: 0.6 %
BILIRUB SERPL-MCNC: 1.1 MG/DL (ref 0.2–0.9)
BUN BLD-MCNC: 24 MG/DL (ref 9–23)
CALCIUM BLD-MCNC: 9.1 MG/DL (ref 8.7–10.6)
CHLORIDE SERPL-SCNC: 109 MMOL/L (ref 98–112)
CO2 SERPL-SCNC: 23 MMOL/L (ref 21–32)
CREAT BLD-MCNC: 0.87 MG/DL
EGFRCR SERPLBLD CKD-EPI 2021: 78 ML/MIN/1.73M2 (ref 60–?)
EOSINOPHIL # BLD AUTO: 0.16 X10(3) UL (ref 0–0.7)
EOSINOPHIL NFR BLD AUTO: 1.5 %
ERYTHROCYTE [DISTWIDTH] IN BLOOD BY AUTOMATED COUNT: 15 %
GLOBULIN PLAS-MCNC: 3.2 G/DL (ref 2–3.5)
GLUCOSE BLD-MCNC: 124 MG/DL (ref 70–99)
HCT VFR BLD AUTO: 48.1 %
HGB BLD-MCNC: 16.2 G/DL
IMM GRANULOCYTES # BLD AUTO: 0.04 X10(3) UL (ref 0–1)
IMM GRANULOCYTES NFR BLD: 0.4 %
LIPASE SERPL-CCNC: 38 U/L (ref 12–53)
LYMPHOCYTES # BLD AUTO: 2 X10(3) UL (ref 1–4)
LYMPHOCYTES NFR BLD AUTO: 18.3 %
MCH RBC QN AUTO: 30.3 PG (ref 26–34)
MCHC RBC AUTO-ENTMCNC: 33.7 G/DL (ref 31–37)
MCV RBC AUTO: 89.9 FL
MONOCYTES # BLD AUTO: 0.84 X10(3) UL (ref 0.1–1)
MONOCYTES NFR BLD AUTO: 7.7 %
NEUTROPHILS # BLD AUTO: 7.8 X10 (3) UL (ref 1.5–7.7)
NEUTROPHILS # BLD AUTO: 7.8 X10(3) UL (ref 1.5–7.7)
NEUTROPHILS NFR BLD AUTO: 71.5 %
OSMOLALITY SERPL CALC.SUM OF ELEC: 299 MOSM/KG (ref 275–295)
PLATELET # BLD AUTO: 222 10(3)UL (ref 150–450)
POTASSIUM SERPL-SCNC: 4.4 MMOL/L (ref 3.5–5.1)
PROT SERPL-MCNC: 7.4 G/DL (ref 5.7–8.2)
RBC # BLD AUTO: 5.35 X10(6)UL
SODIUM SERPL-SCNC: 142 MMOL/L (ref 136–145)
WBC # BLD AUTO: 10.9 X10(3) UL (ref 4–11)

## 2025-02-15 PROCEDURE — 74177 CT ABD & PELVIS W/CONTRAST: CPT | Performed by: EMERGENCY MEDICINE

## 2025-02-15 PROCEDURE — 71045 X-RAY EXAM CHEST 1 VIEW: CPT | Performed by: EMERGENCY MEDICINE

## 2025-02-15 RX ORDER — TAMSULOSIN HYDROCHLORIDE 0.4 MG/1
CAPSULE ORAL
COMMUNITY
Start: 2025-01-06

## 2025-02-15 RX ORDER — ONDANSETRON 2 MG/ML
4 INJECTION INTRAMUSCULAR; INTRAVENOUS ONCE
Status: COMPLETED | OUTPATIENT
Start: 2025-02-15 | End: 2025-02-15

## 2025-02-15 RX ORDER — METOCLOPRAMIDE HYDROCHLORIDE 5 MG/ML
5 INJECTION INTRAMUSCULAR; INTRAVENOUS ONCE
Status: COMPLETED | OUTPATIENT
Start: 2025-02-15 | End: 2025-02-15

## 2025-02-16 ENCOUNTER — ANESTHESIA (OUTPATIENT)
Dept: ENDOSCOPY | Facility: HOSPITAL | Age: OVER 89
End: 2025-02-16
Payer: MEDICARE

## 2025-02-16 ENCOUNTER — ANESTHESIA EVENT (OUTPATIENT)
Dept: ENDOSCOPY | Facility: HOSPITAL | Age: OVER 89
End: 2025-02-16
Payer: MEDICARE

## 2025-02-16 PROBLEM — E86.0 DEHYDRATION: Status: ACTIVE | Noted: 2025-02-16

## 2025-02-16 LAB
ATRIAL RATE: 69 BPM
P AXIS: 14 DEGREES
P-R INTERVAL: 146 MS
Q-T INTERVAL: 388 MS
QRS DURATION: 90 MS
QTC CALCULATION (BEZET): 415 MS
R AXIS: -26 DEGREES
T AXIS: 95 DEGREES
VENTRICULAR RATE: 69 BPM

## 2025-02-16 PROCEDURE — 0DC38ZZ EXTIRPATION OF MATTER FROM LOWER ESOPHAGUS, VIA NATURAL OR ARTIFICIAL OPENING ENDOSCOPIC: ICD-10-PCS | Performed by: INTERNAL MEDICINE

## 2025-02-16 PROCEDURE — 99223 1ST HOSP IP/OBS HIGH 75: CPT | Performed by: HOSPITALIST

## 2025-02-16 RX ORDER — ACETAMINOPHEN 500 MG
1000 TABLET ORAL ONCE AS NEEDED
Status: DISCONTINUED | OUTPATIENT
Start: 2025-02-16 | End: 2025-02-16 | Stop reason: HOSPADM

## 2025-02-16 RX ORDER — BISACODYL 10 MG
10 SUPPOSITORY, RECTAL RECTAL
Status: DISCONTINUED | OUTPATIENT
Start: 2025-02-16 | End: 2025-02-22

## 2025-02-16 RX ORDER — PANTOPRAZOLE SODIUM 40 MG/1
40 TABLET, DELAYED RELEASE ORAL
Status: DISCONTINUED | OUTPATIENT
Start: 2025-02-16 | End: 2025-02-22

## 2025-02-16 RX ORDER — TAMSULOSIN HYDROCHLORIDE 0.4 MG/1
0.4 CAPSULE ORAL NIGHTLY
Status: DISCONTINUED | OUTPATIENT
Start: 2025-02-16 | End: 2025-02-22

## 2025-02-16 RX ORDER — ENOXAPARIN SODIUM 100 MG/ML
40 INJECTION SUBCUTANEOUS DAILY
Status: DISCONTINUED | OUTPATIENT
Start: 2025-02-16 | End: 2025-02-17

## 2025-02-16 RX ORDER — PHENYLEPHRINE HCL 10 MG/ML
VIAL (ML) INJECTION AS NEEDED
Status: DISCONTINUED | OUTPATIENT
Start: 2025-02-16 | End: 2025-02-16 | Stop reason: SURG

## 2025-02-16 RX ORDER — SODIUM CHLORIDE 9 MG/ML
INJECTION, SOLUTION INTRAVENOUS CONTINUOUS
Status: DISCONTINUED | OUTPATIENT
Start: 2025-02-16 | End: 2025-02-18

## 2025-02-16 RX ORDER — ECHINACEA PURPUREA EXTRACT 125 MG
1 TABLET ORAL
Status: DISCONTINUED | OUTPATIENT
Start: 2025-02-16 | End: 2025-02-22

## 2025-02-16 RX ORDER — ISOSORBIDE MONONITRATE 30 MG/1
30 TABLET, EXTENDED RELEASE ORAL DAILY
Status: DISCONTINUED | OUTPATIENT
Start: 2025-02-16 | End: 2025-02-22

## 2025-02-16 RX ORDER — HYDROMORPHONE HYDROCHLORIDE 1 MG/ML
0.2 INJECTION, SOLUTION INTRAMUSCULAR; INTRAVENOUS; SUBCUTANEOUS EVERY 5 MIN PRN
Status: DISCONTINUED | OUTPATIENT
Start: 2025-02-16 | End: 2025-02-16 | Stop reason: HOSPADM

## 2025-02-16 RX ORDER — EPHEDRINE SULFATE 50 MG/ML
INJECTION INTRAVENOUS AS NEEDED
Status: DISCONTINUED | OUTPATIENT
Start: 2025-02-16 | End: 2025-02-16 | Stop reason: SURG

## 2025-02-16 RX ORDER — SODIUM CHLORIDE, SODIUM LACTATE, POTASSIUM CHLORIDE, CALCIUM CHLORIDE 600; 310; 30; 20 MG/100ML; MG/100ML; MG/100ML; MG/100ML
INJECTION, SOLUTION INTRAVENOUS CONTINUOUS PRN
Status: DISCONTINUED | OUTPATIENT
Start: 2025-02-16 | End: 2025-02-16 | Stop reason: SURG

## 2025-02-16 RX ORDER — POLYETHYLENE GLYCOL 3350 17 G/17G
17 POWDER, FOR SOLUTION ORAL DAILY PRN
Status: DISCONTINUED | OUTPATIENT
Start: 2025-02-16 | End: 2025-02-22

## 2025-02-16 RX ORDER — LABETALOL HYDROCHLORIDE 5 MG/ML
5 INJECTION, SOLUTION INTRAVENOUS EVERY 5 MIN PRN
Status: DISCONTINUED | OUTPATIENT
Start: 2025-02-16 | End: 2025-02-16 | Stop reason: HOSPADM

## 2025-02-16 RX ORDER — MINERAL OIL AND PETROLATUM 150; 830 MG/G; MG/G
OINTMENT OPHTHALMIC 3 TIMES DAILY
Status: DISCONTINUED | OUTPATIENT
Start: 2025-02-16 | End: 2025-02-22

## 2025-02-16 RX ORDER — HYDROCODONE BITARTRATE AND ACETAMINOPHEN 5; 325 MG/1; MG/1
1 TABLET ORAL ONCE AS NEEDED
Status: DISCONTINUED | OUTPATIENT
Start: 2025-02-16 | End: 2025-02-16 | Stop reason: HOSPADM

## 2025-02-16 RX ORDER — DEXAMETHASONE SODIUM PHOSPHATE 4 MG/ML
VIAL (ML) INJECTION AS NEEDED
Status: DISCONTINUED | OUTPATIENT
Start: 2025-02-16 | End: 2025-02-16 | Stop reason: SURG

## 2025-02-16 RX ORDER — ONDANSETRON 2 MG/ML
4 INJECTION INTRAMUSCULAR; INTRAVENOUS EVERY 6 HOURS PRN
Status: DISCONTINUED | OUTPATIENT
Start: 2025-02-16 | End: 2025-02-16 | Stop reason: HOSPADM

## 2025-02-16 RX ORDER — NALOXONE HYDROCHLORIDE 0.4 MG/ML
80 INJECTION, SOLUTION INTRAMUSCULAR; INTRAVENOUS; SUBCUTANEOUS AS NEEDED
Status: DISCONTINUED | OUTPATIENT
Start: 2025-02-16 | End: 2025-02-16 | Stop reason: HOSPADM

## 2025-02-16 RX ORDER — LISINOPRIL 10 MG/1
10 TABLET ORAL 2 TIMES DAILY
Status: DISCONTINUED | OUTPATIENT
Start: 2025-02-16 | End: 2025-02-22

## 2025-02-16 RX ORDER — HYDROMORPHONE HYDROCHLORIDE 1 MG/ML
0.6 INJECTION, SOLUTION INTRAMUSCULAR; INTRAVENOUS; SUBCUTANEOUS EVERY 5 MIN PRN
Status: DISCONTINUED | OUTPATIENT
Start: 2025-02-16 | End: 2025-02-16 | Stop reason: HOSPADM

## 2025-02-16 RX ORDER — METOCLOPRAMIDE HYDROCHLORIDE 5 MG/ML
5 INJECTION INTRAMUSCULAR; INTRAVENOUS EVERY 8 HOURS PRN
Status: DISCONTINUED | OUTPATIENT
Start: 2025-02-16 | End: 2025-02-22

## 2025-02-16 RX ORDER — SODIUM CHLORIDE, SODIUM LACTATE, POTASSIUM CHLORIDE, CALCIUM CHLORIDE 600; 310; 30; 20 MG/100ML; MG/100ML; MG/100ML; MG/100ML
INJECTION, SOLUTION INTRAVENOUS CONTINUOUS
Status: DISCONTINUED | OUTPATIENT
Start: 2025-02-16 | End: 2025-02-16 | Stop reason: HOSPADM

## 2025-02-16 RX ORDER — HYDROCODONE BITARTRATE AND ACETAMINOPHEN 5; 325 MG/1; MG/1
2 TABLET ORAL ONCE AS NEEDED
Status: DISCONTINUED | OUTPATIENT
Start: 2025-02-16 | End: 2025-02-16 | Stop reason: HOSPADM

## 2025-02-16 RX ORDER — ONDANSETRON 2 MG/ML
INJECTION INTRAMUSCULAR; INTRAVENOUS AS NEEDED
Status: DISCONTINUED | OUTPATIENT
Start: 2025-02-16 | End: 2025-02-16 | Stop reason: SURG

## 2025-02-16 RX ORDER — LIDOCAINE HYDROCHLORIDE 10 MG/ML
INJECTION, SOLUTION EPIDURAL; INFILTRATION; INTRACAUDAL; PERINEURAL AS NEEDED
Status: DISCONTINUED | OUTPATIENT
Start: 2025-02-16 | End: 2025-02-16 | Stop reason: SURG

## 2025-02-16 RX ORDER — ONDANSETRON 2 MG/ML
4 INJECTION INTRAMUSCULAR; INTRAVENOUS EVERY 6 HOURS PRN
Status: DISCONTINUED | OUTPATIENT
Start: 2025-02-16 | End: 2025-02-19

## 2025-02-16 RX ORDER — ASPIRIN 81 MG/1
81 TABLET, CHEWABLE ORAL DAILY
Status: DISCONTINUED | OUTPATIENT
Start: 2025-02-16 | End: 2025-02-22

## 2025-02-16 RX ORDER — SENNOSIDES 8.6 MG
17.2 TABLET ORAL NIGHTLY PRN
Status: DISCONTINUED | OUTPATIENT
Start: 2025-02-16 | End: 2025-02-22

## 2025-02-16 RX ORDER — NITROGLYCERIN 0.4 MG/1
0.4 TABLET SUBLINGUAL EVERY 5 MIN PRN
Status: DISCONTINUED | OUTPATIENT
Start: 2025-02-16 | End: 2025-02-22

## 2025-02-16 RX ORDER — ROCURONIUM BROMIDE 10 MG/ML
INJECTION, SOLUTION INTRAVENOUS AS NEEDED
Status: DISCONTINUED | OUTPATIENT
Start: 2025-02-16 | End: 2025-02-16 | Stop reason: SURG

## 2025-02-16 RX ORDER — SODIUM PHOSPHATE, DIBASIC AND SODIUM PHOSPHATE, MONOBASIC 7; 19 G/230ML; G/230ML
1 ENEMA RECTAL ONCE AS NEEDED
Status: DISCONTINUED | OUTPATIENT
Start: 2025-02-16 | End: 2025-02-22

## 2025-02-16 RX ORDER — ACETAMINOPHEN 500 MG
500 TABLET ORAL EVERY 4 HOURS PRN
Status: DISCONTINUED | OUTPATIENT
Start: 2025-02-16 | End: 2025-02-22

## 2025-02-16 RX ORDER — HYDROMORPHONE HYDROCHLORIDE 1 MG/ML
0.4 INJECTION, SOLUTION INTRAMUSCULAR; INTRAVENOUS; SUBCUTANEOUS EVERY 5 MIN PRN
Status: DISCONTINUED | OUTPATIENT
Start: 2025-02-16 | End: 2025-02-16 | Stop reason: HOSPADM

## 2025-02-16 RX ORDER — ROSUVASTATIN CALCIUM 10 MG/1
10 TABLET, COATED ORAL DAILY
Status: DISCONTINUED | OUTPATIENT
Start: 2025-02-16 | End: 2025-02-22

## 2025-02-16 RX ADMIN — ROCURONIUM BROMIDE 30 MG: 10 INJECTION, SOLUTION INTRAVENOUS at 14:10:00

## 2025-02-16 RX ADMIN — EPHEDRINE SULFATE 5 MG: 50 INJECTION INTRAVENOUS at 14:20:00

## 2025-02-16 RX ADMIN — LIDOCAINE HYDROCHLORIDE 50 MG: 10 INJECTION, SOLUTION EPIDURAL; INFILTRATION; INTRACAUDAL; PERINEURAL at 14:02:00

## 2025-02-16 RX ADMIN — PHENYLEPHRINE HCL 100 MCG: 10 MG/ML VIAL (ML) INJECTION at 14:02:00

## 2025-02-16 RX ADMIN — ROCURONIUM BROMIDE 10 MG: 10 INJECTION, SOLUTION INTRAVENOUS at 14:02:00

## 2025-02-16 RX ADMIN — SODIUM CHLORIDE, SODIUM LACTATE, POTASSIUM CHLORIDE, CALCIUM CHLORIDE: 600; 310; 30; 20 INJECTION, SOLUTION INTRAVENOUS at 13:58:00

## 2025-02-16 RX ADMIN — DEXAMETHASONE SODIUM PHOSPHATE 4 MG: 4 MG/ML VIAL (ML) INJECTION at 14:10:00

## 2025-02-16 RX ADMIN — PHENYLEPHRINE HCL 100 MCG: 10 MG/ML VIAL (ML) INJECTION at 14:10:00

## 2025-02-16 RX ADMIN — PHENYLEPHRINE HCL 100 MCG: 10 MG/ML VIAL (ML) INJECTION at 14:20:00

## 2025-02-16 RX ADMIN — ONDANSETRON 4 MG: 2 INJECTION INTRAMUSCULAR; INTRAVENOUS at 14:40:00

## 2025-02-16 NOTE — ANESTHESIA PROCEDURE NOTES
Airway  Date/Time: 2/16/2025 2:03 PM  Urgency: elective    Airway not difficult    General Information and Staff    Patient location during procedure: OR  Anesthesiologist: Humberto Hi MD  Performed: anesthesiologist   Performed by: Humberto Hi MD  Authorized by: Humberto Hi MD      Indications and Patient Condition  Indications for airway management: anesthesia  Sedation level: deep  Preoxygenated: yes  Patient position: sniffing  Mask difficulty assessment: 1 - vent by mask    Final Airway Details  Final airway type: endotracheal airway      Successful airway: ETT  Cuffed: yes   Successful intubation technique: direct laryngoscopy  Facilitating devices/methods: intubating stylet  Endotracheal tube insertion site: oral  Blade: GlideScope  Blade size: #3  ETT size (mm): 7.0    Cormack-Lehane Classification: grade I - full view of glottis  Placement verified by: capnometry   Measured from: lips  ETT to lips (cm): 22  Number of attempts at approach: 1  Number of other approaches attempted: 0

## 2025-02-16 NOTE — ED INITIAL ASSESSMENT (HPI)
Patient here with vomiting that started last night. States some generalized abdominal pain. Denies diarrhea or constipation.

## 2025-02-16 NOTE — H&P
[facility]     Beba Mijares Patient Status:  Inpatient    1926 MRN [id number]   Location [facility] 3SW-A Attending Mark Day,    Hosp Day # 0 PCP CLARK HIGGINS     Chief Complaint: Vomiting    Subjective:    History of Present Illness:     Beba Mijares is a 89 year old male with a past medical history of hypertension, hyperlipidemia, CVA who presents to the emergency department with vomiting.  Patient said he was eating lasagna last night, was unable to eat his full meal as he began having upset stomach.  He then began vomiting overnight and into this morning.  He has not been able to eat or drink anything all day long.  He came into the ER with continued symptoms, failed p.o. challenge.  Patient denies having any fevers, no abdominal pain, no diarrhea.  He has no other acute complaints at this time.    History/Other:    Past Medical History:  Past Medical History:    Atherosclerosis of coronary artery    Stents 3    BLOOD CLOTS    Colitis    Deaf    Heart attack (HCC)    NSTEMI    High blood pressure    High cholesterol    Kidney stone    Pulmonary embolism (HCC)    DVT & PE POst  Fractured Metatarsil    Stroke (HCC)     Past Surgical History:   Past Surgical History:   Procedure Laterality Date    Angiogram      Angioplasty (coronary)  16      Stents LAD, CIRC, RCA    Cath cartotid stent      Cath drug eluting stent      Prostate biopsies        Family History:   Family History   Problem Relation Age of Onset    Hypertension Sister     Cancer Brother      Social History:    reports that he has quit smoking. His smoking use included cigarettes. He does not have any smokeless tobacco history on file. He reports that he does not currently use alcohol. He reports that he does not use drugs.     Allergies: Allergies[1]    Medications:  Medications Ordered Prior to Encounter[2]    Review of Systems:   A comprehensive review of systems was completed.     Pertinent positives and negatives noted in the HPI.    Objective:   Physical Exam:    /77 (BP Location: Left arm)   Pulse 81   Temp 97.6 °F (36.4 °C) (Oral)   Resp 18   Ht 5' 6\" (1.676 m)   Wt 100 lb (45.4 kg)   SpO2 91%   BMI 16.14 kg/m²   General: No acute distress, Alert, pleasant   Respiratory: No rhonchi, no wheezes  Cardiovascular: S1, S2. Regular rate and rhythm  Abdomen: Soft, Non-tender, non-distended, positive bowel sounds  Neuro: No new focal deficits  Extremities: No edema    Results:    Labs:      Labs Last 24 Hours:    Recent Labs   Lab 02/15/25  1909   RBC 5.35   HGB 16.2   HCT 48.1   MCV 89.9   MCH 30.3   MCHC 33.7   RDW 15.0   NEPRELIM 7.80*   WBC 10.9   .0       Recent Labs   Lab 02/15/25  2002   *   BUN 24*   CREATSERUM 0.87   EGFRCR 78   CA 9.1   ALB 4.2      K 4.4      CO2 23.0   ALKPHO 58   AST 26   ALT 14   BILT 1.1*   TP 7.4       Estimated Glomerular Filtration Rate: 78 mL/min/1.73m2 (by CKD-EPI based on SCr of 0.87 mg/dL).    Lab Results   Component Value Date    INR 1.05 09/18/2016    INR 1.03 07/06/2016       No results for input(s): \"TROP\", \"TROPHS\", \"CK\" in the last 168 hours.    No results for input(s): \"TROP\", \"PBNP\" in the last 168 hours.    No results for input(s): \"PCT\" in the last 168 hours.    Imaging: Imaging data reviewed in Epic.    Assessment & Plan:      #Intractable N/v  Patient unable to tolerate PO  CT abd reviewed, no e/o obstruction   EKG NSR, no acute st changes  CXR clear, no consolidation   IVF's, CLD, ADAT    #CAD  ASA, statin, ace, imdur    #Essential HTN  Lisinopril     #GERD  PPI    #HLD  Statin        Plan of care discussed with patient, er physician, nurse     Paxton Arauz MD    Supplementary Documentation:     The 21st Century Cures Act makes medical notes like these available to patients in the interest of transparency. Please be advised this is a medical document. Medical documents are intended to carry relevant  information, facts as evident, and the clinical opinion of the practitioner. The medical note is intended as peer to peer communication and may appear blunt or direct. It is written in medical language and may contain abbreviations or verbiage that are unfamiliar.               **Certification      PHYSICIAN Certification of Need for Inpatient Hospitalization - Initial Certification    Patient will require inpatient services that will reasonably be expected to span two midnight's based on the clinical documentation in H+P.   Based on patients current state of illness, I anticipate that, after discharge, patient will require TBD.                        [1] No Known Allergies  [2]   No current facility-administered medications on file prior to encounter.     Current Outpatient Medications on File Prior to Encounter   Medication Sig Dispense Refill    tamsulosin 0.4 MG Oral Cap TAKE 1 CAPSULE(0.4 MG) BY MOUTH EVERY NIGHT. SWALLOW WHOLE      lisinopril 10 MG Oral Tab Take 1 tablet (10 mg total) by mouth 2 (two) times daily.      isosorbide mononitrate ER 30 MG Oral Tablet 24 Hr Take 1 tablet (30 mg total) by mouth daily.      pantoprazole 40 MG Oral Tab EC Take 1 tablet (40 mg total) by mouth 2 (two) times daily before meals.      Rosuvastatin Calcium (CRESTOR) 10 MG Oral Tab Take 1 tablet (10 mg total) by mouth daily.      aspirin 81 MG Oral Chew Tab Chew 1 tablet (81 mg total) by mouth daily.      NITROSTAT 0.4 MG Sublingual SL Tab nitroGLYcerin (NITROSTAT) 0.4 MG sublingual tablet, [RxNorm: 688888]      multivitamin Oral Tab Take 1 tablet by mouth at bedtime.

## 2025-02-16 NOTE — PLAN OF CARE
Pt A&Ox4. VSS on 2L O2 via nasal cannula. [place], bilateral hearing aids. Pt not able to keep water down. Has an episode of emesis this AM. IV zofran given. IVF infusing. Denies pain at this time. Fall precautions in place and pt instructed to call for assistance. Plan for GI consult. POC discussed with pt and pt's daughter at bedside.    Adequate: hears normal conversation without difficulty

## 2025-02-16 NOTE — PLAN OF CARE
NURSING ADMISSION NOTE      Patient admitted via ambulance   Oriented to room.  Safety precautions initiated.  Bed in low position.  Call light in reach.

## 2025-02-16 NOTE — PLAN OF CARE
Patient A&Ox4 . Very hard of hearing has BL hearing aids . Up with min assist , voided in bathroom . Had small emesis on arrival clear fluid. Zofran given with relief . 2 person skin check done with PCT Sherly . Skin intact ,some redness to coccyx area . Iv infusing . Plan of care updated . Reminded to \"call don't fall\" . Will continue to monitor .

## 2025-02-16 NOTE — PROGRESS NOTES
Patient seen as follow up from this morning's H and P from Dr. Arauz    Patient admitted with vomiting,  discussed with daughter in room,  she reporst this is a chronic issue which happens when patient needs and EGD with dilation for esophageal stenosis    Will keep IVF  Consult GI for possible EGD with dilation    Monique Montesinos M.D.  [facility]ist

## 2025-02-16 NOTE — ED PROVIDER NOTES
Patient Seen in: [facility] [place]      History     Chief Complaint   Patient presents with    Nausea/Vomiting/Diarrhea    Cough/URI    Eye Visual Problem     Stated Complaint: vomiting since last night, slight cough, eyes red    Subjective:   HPI  Patient is a 89-year-old male with a history of CAD, hypertension, hyperlipidemia, DVT/PE not on anticoagulation who presents to the ED for evaluation sent in by immediate care for vomiting since last night. Patient is accompanied by his son who assists with history. Patient reports 4-5 episodes of NBNB emesis since last night. He has mild epigastric abd pain with vomiting.  Last bowel movement was yesterday and was normal.  Patient denies any fevers, shortness of breath, chest pain, urinary symptoms.  Mild cough reported in triage but pt denies any cough to me. No known sick contacts. Pt tested negative for covid, flu at IC. Pt lives at home.     Objective:     Past Medical History:    Atherosclerosis of coronary artery    Stents 3    BLOOD CLOTS    Colitis    Deaf    Heart attack (HCC)    NSTEMI    High blood pressure    High cholesterol    Kidney stone    Pulmonary embolism (HCC)    DVT & PE POst  Fractured Metatarsil    Stroke (HCC)              Past Surgical History:   Procedure Laterality Date    Angiogram      Angioplasty (coronary)  9/21/16      Stents LAD, CIRC, RCA    Cath cartotid stent      Cath drug eluting stent      Prostate biopsies                  Social History     Socioeconomic History    Marital status:    Tobacco Use    Smoking status: Former     Types: Cigarettes   Vaping Use    Vaping status: Never Used   Substance and Sexual Activity    Alcohol use: Not Currently     Comment: occ    Drug use: No     Social Drivers of Health     Food Insecurity: No Food Insecurity (6/4/2024)    Food Insecurity     Food Insecurity: Never true   Transportation Needs: No Transportation Needs (6/4/2024)    Transportation Needs     Lack  of Transportation: No   Housing Stability: Low Risk  (6/4/2024)    Housing Stability     Housing Instability: No                  Physical Exam     ED Triage Vitals [02/15/25 1850]   BP 96/74   Pulse 72   Resp 20   Temp 98.3 °F (36.8 °C)   Temp src Temporal   SpO2 99 %   O2 Device None (Room air)       Current Vitals:   Vital Signs  BP: 119/82  Pulse: 70  Resp: 18  Temp: 98.3 °F (36.8 °C)  Temp src: Temporal    Oxygen Therapy  SpO2: 96 %  O2 Device: None (Room air)        Physical Exam  Vitals and nursing note reviewed.   Constitutional:       General: He is not in acute distress.  HENT:      Head: Normocephalic and atraumatic.      Nose: Nose normal.      Mouth/Throat:      Mouth: Mucous membranes are moist.   Eyes:      Extraocular Movements: Extraocular movements intact.      Pupils: Pupils are equal, round, and reactive to light.   Cardiovascular:      Rate and Rhythm: Normal rate and regular rhythm.      Pulses: Normal pulses.   Pulmonary:      Effort: Pulmonary effort is normal. No respiratory distress.      Breath sounds: No wheezing.   Abdominal:      General: There is no distension.      Palpations: Abdomen is soft.      Tenderness: There is no abdominal tenderness.   Musculoskeletal:         General: No swelling. Normal range of motion.      Cervical back: Normal range of motion.   Skin:     General: Skin is warm and dry.      Capillary Refill: Capillary refill takes less than 2 seconds.   Neurological:      General: No focal deficit present.      Mental Status: He is alert.   Psychiatric:         Mood and Affect: Mood normal.             ED Course     Labs Reviewed   CBC WITH DIFFERENTIAL WITH PLATELET - Abnormal; Notable for the following components:       Result Value    Neutrophil Absolute Prelim 7.80 (*)     Neutrophil Absolute 7.80 (*)     All other components within normal limits   COMP METABOLIC PANEL (14) - Abnormal; Notable for the following components:    Glucose 124 (*)     BUN 24 (*)      Calculated Osmolality 299 (*)     Bilirubin, Total 1.1 (*)     All other components within normal limits   LIPASE - Normal   URINALYSIS, ROUTINE         EKG    Rate, intervals and axes as noted on EKG Report.  Rate: 69  Rhythm: Sinus Rhythm  Reading: Normal sinus rhythm with ventricular rate of 69, no acute ST elevations or depressions, nonspecific T wave abnormality similar to prior.          MDM      Patient is a 89-year-old male with a history of CAD, hypertension, hyperlipidemia, DVT/PE not on anticoagulation who presents to the ED for evaluation sent in by immediate care for vomiting since last night.  Patient is afebrile, hemodynamically stable and satting well on room air.  On exam patient is nontoxic-appearing. He has no abdominal tenderness.    Differential diagnosis includes not limited to gastroenteritis, bowel obstruction, dehydration, electrolyte abnormality    Plan for labs, CT, UA. Will give IVF, zofran and re-evaluate.     ED Course as of 02/16/25 0115  ------------------------------------------------------------  Time: 02/15 2033  Comment: CBC shows no leukocytosis, normal hemoglobin.  CMP with normal creatinine, BUN 24, bilirubin 1.1, otherwise unremarkable.  Glucose 124.  Lipase normal.  ------------------------------------------------------------  Time: 02/15 2228  Value: Urinalysis, Routine  Comment: (Reviewed)  ------------------------------------------------------------  Time: 02/15 2247  Comment: CXR shows likely atelectasis, no large focal consolidation. CT independently reviewed, no obstruction.     CONCLUSION:    1. Marked uncomplicated diverticulosis with no obstruction.   2. Moderate-sized hiatal hernia with large amount of fluid in the distal esophagus consistent with reflux.   3. Cirrhotic changes of the liver noted.   4. Moderate enlargement of the prostate deforming the bladder base.   5. Marked vasculopathy.  Marked narrowing of the SMA and origin of the renal arteries.   6.  Uncomplicated cholelithiasis.       Pt given IV reglan but still reporting nausea and has had a couple episodes of emesis since being in ED after receiving so he was admitted to [place] hospitalist.          Admission disposition: 2/15/2025 10:51 PM           Medical Decision Making  Amount and/or Complexity of Data Reviewed  Labs: ordered. Decision-making details documented in ED Course.  Radiology: ordered and independent interpretation performed. Decision-making details documented in ED Course.  ECG/medicine tests: ordered and independent interpretation performed. Decision-making details documented in ED Course.    Risk  Decision regarding hospitalization.        Disposition and Plan     Clinical Impression:  1. Nausea and vomiting, unspecified vomiting type    2. Dehydration         Disposition:  Admit  2/15/2025 10:51 pm    Follow-up:  No follow-up provider specified.        Medications Prescribed:  Current Discharge Medication List              Supplementary Documentation:         Hospital Problems       Present on Admission  Date Reviewed: 4/9/2024            ICD-10-CM Noted POA    * (Principal) Nausea and vomiting, unspecified vomiting type R11.2 2/15/2025 Unknown    Dehydration E86.0 2/16/2025 Unknown

## 2025-02-16 NOTE — ANESTHESIA POSTPROCEDURE EVALUATION
[facility]    Beba Mijares Patient Status:  Inpatient   Age/Gender 89 year old male MRN [id number]   Location [facility] POST ANESTHESIA CARE UNIT Attending Monique Montesinos MD   Hosp Day # 0 PCP CLARK HIGGINS       Anesthesia Post-op Note    ESOPHAGOGASTRODUODENOSCOPY (EGD)WITH REMOVAL OF FOREIGN BODY    Procedure Summary       Date: 02/16/25 Room / Location:  ENDOSCOPY 03 / [facility]    Anesthesia Start: 1358 Anesthesia Stop: 1510    Procedure: ESOPHAGOGASTRODUODENOSCOPY (EGD)WITH REMOVAL OF FOREIGN BODY Diagnosis: (Esophagitis)    Surgeons: Yobani Corral MD Anesthesiologist: Humberto Hi MD    Anesthesia Type: MAC ASA Status: 3            Anesthesia Type: MAC    Vitals Value Taken Time   BP 93/65 02/16/25 1511   Temp 97.6 °F (36.4 °C) 02/16/25 1511   Pulse 80 02/16/25 1511   Resp 29 02/16/25 1511   SpO2 99 % 02/16/25 1511   Vitals shown include unfiled device data.        Patient Location: PACU    Anesthesia Type: general    Airway Patency: patent and extubated    Postop Pain Control: adequate    Mental Status: mildly sedated but able to meaningfully participate in the post-anesthesia evaluation    Nausea/Vomiting: none    Cardiopulmonary/Hydration status: stable euvolemic    Complications: no apparent anesthesia related complications    Postop vital signs: stable    Dental Exam: Unchanged from Preop    Patient to be discharged from PACU when criteria met.

## 2025-02-16 NOTE — ANESTHESIA PREPROCEDURE EVALUATION
PRE-OP EVALUATION    Patient Name: Beba Mijares    Admit Diagnosis: Dehydration [E86.0]  Nausea and vomiting, unspecified vomiting type [R11.2]    Pre-op Diagnosis: food impaction    ESOPHAGOGASTRODUODENOSCOPY (EGD)WITH REMOVAL OF FOREIGN BODY    Anesthesia Procedure: ESOPHAGOGASTRODUODENOSCOPY (EGD)WITH REMOVAL OF FOREIGN BODY    Surgeons and Role:     * Yobani Corral MD - Primary    Pre-op vitals reviewed.  Temp: 98.5 °F (36.9 °C)  Pulse: 73  Resp: 18  BP: 94/65  SpO2: 93 %  Body mass index is 22.6 kg/m².    Current medications reviewed.  Hospital Medications:   aspirin chewable tab 81 mg  81 mg Oral Daily    isosorbide mononitrate ER (Imdur) 24 hr tab 30 mg  30 mg Oral Daily    lisinopril (Zestril) tab 10 mg  10 mg Oral BID    nitroglycerin (Nitrostat) SL tab 0.4 mg  0.4 mg Sublingual Q5 Min PRN    pantoprazole (Protonix) DR tab 40 mg  40 mg Oral BID AC    rosuvastatin (Crestor) tab 10 mg  10 mg Oral Daily    tamsulosin (Flomax) cap 0.4 mg  0.4 mg Oral Nightly    acetaminophen (Tylenol Extra Strength) tab 500 mg  500 mg Oral Q4H PRN    melatonin tab 3 mg  3 mg Oral Nightly PRN    glycerin-hypromellose- (Artificial Tears) 0.2-0.2-1 % ophthalmic solution 1 drop  1 drop Both Eyes QID PRN    sodium chloride (Saline Mist) 0.65 % nasal solution 1 spray  1 spray Each Nare Q3H PRN    sodium chloride 0.9% infusion   Intravenous Continuous    enoxaparin (Lovenox) 40 MG/0.4ML SUBQ injection 40 mg  40 mg Subcutaneous Daily    polyethylene glycol (PEG 3350) (Miralax) 17 g oral packet 17 g  17 g Oral Daily PRN    sennosides (Senokot) tab 17.2 mg  17.2 mg Oral Nightly PRN    bisacodyl (Dulcolax) 10 MG rectal suppository 10 mg  10 mg Rectal Daily PRN    fleet enema (Fleet) rectal enema 133 mL  1 enema Rectal Once PRN    ondansetron (Zofran) 4 MG/2ML injection 4 mg  4 mg Intravenous Q6H PRN    metoclopramide (Reglan) 5 mg/mL injection 5 mg  5 mg Intravenous Q8H PRN    [COMPLETED] sodium chloride 0.9 % IV bolus 500 mL   500 mL Intravenous Once    [COMPLETED] ondansetron (Zofran) 4 MG/2ML injection 4 mg  4 mg Intravenous Once    [COMPLETED] iopamidol 76% (ISOVUE-370) injection for power injector  65 mL Intravenous ONCE PRN    [COMPLETED] metoclopramide (Reglan) 5 mg/mL injection 5 mg  5 mg Intravenous Once       Outpatient Medications:   Prescriptions Prior to Admission[1]    Allergies: Patient has no known allergies.      Anesthesia Evaluation    Patient summary reviewed.    Anesthetic Complications  (-) history of anesthetic complications         GI/Hepatic/Renal    Negative GI/hepatic/renal ROS.                             Cardiovascular        Exercise tolerance: good     MET: >4      (+) hypertension   (+) hyperlipidemia  (+) CAD    (+) CABG/stent                            Endo/Other    Negative endo/other ROS.                              Pulmonary    Negative pulmonary ROS.                       Neuro/Psych          (+) CVA                            Past Surgical History:   Procedure Laterality Date    Angiogram      Angioplasty (coronary)  9/21/16      Stents LAD, CIRC, RCA    Cath cartotid stent      Cath drug eluting stent      Prostate biopsies       Social History     Socioeconomic History    Marital status:    Tobacco Use    Smoking status: Former     Types: Cigarettes   Vaping Use    Vaping status: Never Used   Substance and Sexual Activity    Alcohol use: Not Currently     Comment: occ    Drug use: No     History   Drug Use No     Available pre-op labs reviewed.  Lab Results   Component Value Date    WBC 10.9 02/15/2025    RBC 5.35 02/15/2025    HGB 16.2 02/15/2025    HCT 48.1 02/15/2025    MCV 89.9 02/15/2025    MCH 30.3 02/15/2025    MCHC 33.7 02/15/2025    RDW 15.0 02/15/2025    .0 02/15/2025     Lab Results   Component Value Date     02/15/2025    K 4.4 02/15/2025     02/15/2025    CO2 23.0 02/15/2025    BUN 24 (H) 02/15/2025    CREATSERUM 0.87 02/15/2025     (H) 02/15/2025    CA  9.1 02/15/2025            Airway      Mallampati: III  Mouth opening: >3 FB  TM distance: > 6 cm  Neck ROM: full Cardiovascular    Cardiovascular exam normal.  Rhythm: regular  Rate: normal     Dental    Dentition appears grossly intact         Pulmonary    Pulmonary exam normal.  Breath sounds clear to auscultation bilaterally.               Other findings              ASA: 3   Plan: MAC  NPO status verified and patient meets guidelines.  Patient has not taken beta blockers in last 24 hours.  Post-procedure pain management plan discussed with surgeon and patient.    Comment: I spoke with the patient and his daughter and discussed the risks of general anesthesia, which include nausea and vomiting; sore throat; injury to the lips, gums, teeth, and eyes; cardiac, pulmonary, and neurologic events; aspiration; and allergic reactions. The patient and his daughter understand these risks and consents to receiving general anesthesia for this procedure.  Plan/risks discussed with: patient and child/children                Present on Admission:  **None**             [1]   Medications Prior to Admission   Medication Sig Dispense Refill Last Dose/Taking    tamsulosin 0.4 MG Oral Cap TAKE 1 CAPSULE(0.4 MG) BY MOUTH EVERY NIGHT. SWALLOW WHOLE   2/14/2025    lisinopril 10 MG Oral Tab Take 1 tablet (10 mg total) by mouth 2 (two) times daily.   2/15/2025    isosorbide mononitrate ER 30 MG Oral Tablet 24 Hr Take 1 tablet (30 mg total) by mouth daily.   2/15/2025    pantoprazole 40 MG Oral Tab EC Take 1 tablet (40 mg total) by mouth 2 (two) times daily before meals.   2/15/2025    Rosuvastatin Calcium (CRESTOR) 10 MG Oral Tab Take 1 tablet (10 mg total) by mouth daily.   2/15/2025    aspirin 81 MG Oral Chew Tab Chew 1 tablet (81 mg total) by mouth daily.   2/14/2025    NITROSTAT 0.4 MG Sublingual SL Tab nitroGLYcerin (NITROSTAT) 0.4 MG sublingual tablet, [RxNorm: 493992]       multivitamin Oral Tab Take 1 tablet by mouth at bedtime.    2/14/2025

## 2025-02-16 NOTE — PROGRESS NOTES
Pt having worsening redness around eyes after giving artifical tears drops. Cool wash cloth applied. Dr Jaguar mccord. Spoke with Dr Montesinos. Ointment ordered.

## 2025-02-16 NOTE — SIGNIFICANT EVENT
Clinical concern for food impaction. Recommend NPO, urgent EGD.    UpperEndoscopy with the assistance of general anesthesia for further evaluation-the risks, benefits and alternatives were discussed, including the risk of anesthesia and the risk of perforation and bleeding with the procedure itself.  The risks of not proceeding were also discussed, including the risks of missing lesions including polyps or masses. The patient and daughter express an understanding and agrees to proceed as planned.       Full note to follow      Yobani Corral MD  [facility]

## 2025-02-16 NOTE — OPERATIVE REPORT
EGD Operative Report  Patient Name: Beba Mijares  YOB: 1926  MRN: [id number]  Procedure: Esophagogastroduodenoscopy (EGD)  with food bolus removal  Pre-operative Diagnosis & Indication: dysphagia, vomiting  Post-operative Diagnosis:   Distal esophageal food impaction, s/p removal with banding cap, rat tooth, and large forceps  Severe distal esophagitis secondary to food impaction  Distal esophageal stricture  2 cm hiatal hernia  Attending Endoscopist: Yobani Corral MD  Informed Consent: The planned procedure(s), the explanation of the procedure, its expected benefits, the potential complications and risks and possible alternatives and their benefits and risks were discussed with the patient or the patient's surrogate. The discussion of risks, not limited to but including bleeding, infection, perforation, adverse effects from anesthesia, need for emergency surgery/prolonged hospitalization,  cardiac arrhythmias,  and aspiration were discussed with patient.  Pt and/or surrogate understood the proposed procedure(s), its risks, benefits and alternatives and wish to proceed with procedure(s). All questions answered in full.  After all questions were answered to their satisfaction, a signed, informed, and witnessed consent was obtained.  Physical Exam: Heart: regular rate and rhythm. No rubs, murmurs, or gallops. Lungs: Clear to auscultation bilaterally. Abdomen: Soft, non-tender, non distended. No rebound tenderness, no guarding.   A TIME OUT WAS COMPLETED prior to the procedure to confirm the patient, procedure(s) and complete endoscopy safety procedure.  Sedation: Monitored Anesthesia Care; ASA class per anesthesiology team   Monitoring: Pulsoximetry, pulse, respirations, and blood pressure , vitals were monitored throughout the entire procedure under monitored anesthesia care.   Procedure: The patient was then brought to the endoscopy suite where his/her pulse, pulse oximetry and blood pressure were  monitored. The patient was placed in the left lateral decubitus position and deep sedation was administered. Once adequate sedation was achieved, a bite block was placed and a lubricated tip of an Olympus video upper endoscope was inserted through the oropharynx and gently manipulated through the esophagus into the stomach and the second portion of the duodenum. Upon withdrawal of the endoscope, careful visualization of the mucosa was performed. The endoscope was then withdrawn into the gastric antrum and placed in a retroflexed position.  The endoscope was then righted, and air was suctioned from the stomach.  The endoscope was then withdrawn from the patient, with careful visual inspection of the mucosa. The patient left the procedure room in stable condition for recovery. Findings and endotherapy as listed below  Toleration: Patient tolerated procedure well   Complications: No immediate complications   Technical Difficulty:  The procedure was not technically difficult   Estimated Blood Loss: Minimal, less than 5mL of estimated blood loss.   Findings and Therapeutics:  Esophagus:   There was a large food impaction in the distal esophagus. The endoscope was removed and an overtube was inserted into the esophagus. Then, attempts were made to remove the impaction with a banding cap, which were unsuccessful. After this, multiple pieces were removed with a rat tooth and a large forceps. After partial removal, the food bolus was able to be pushed forward and displaced into the stomach. A 2cm distal esophageal stricture was noted from 35 to 33cm from the incisors. There was severe esophagitis and mucosal rent from the impacted food bolus, which was located just proximal to the stricture.  GEJ junction traversed with endoscope without resistance.  The Z-line was irregular, appreciated at 35 cm from the incisors. The diaphragmatic impression was located at 37 cm from the incisors.    Stomach:    The gastric body, antrum,  fundus, cardia, and angularis were normal. No ulcers, erosions or masses visualized. Endoscope was placed in a retroflexion view in the stomach. There was evidence of a small hiatal hernia.   Duodenum:   The entire examined duodenum was normal.    Recommendations:  Post EGD precautions, watch for bleeding, infection, perforation, adverse drug reactions   Follow-up biopsies  Pantoprazole 40mg po bid  Avoid non-aspirin NSAID  Clear liquid diet only, do not advance today; if tolerating, would only advance to pureed foods on 2/17 given high risk for recurrent food impaction, until PPI can help mucosal heal and patient can have his stricture dilated. He follows with Dr. Samson at [facility]. Recommend PPI BID and repeat EGD for dilation in 2 weeks with Dr. Samson.    Yobani Corral MD  2/16/2025  2:49 PM

## 2025-02-16 NOTE — CONSULTS
Consultation Note        Beba Mijares Patient Status:  Inpatient    1926 MRN [id number]   Location [facility] Attending Monique Montesinos MD   Hosp Day # 0 PCP CLARK HIGGINS       Reason for Consultation   Dysphagia, vomiting       History of Present Illness   Mr Mijares is a 89 year old male with a history of GERD, peptic stricture, Ontiveros's, CAD, and VTE who presented with vomiting. He ate lasagna on 2/15, and hadn't been able to swallow anything since and vomits anything he eats/drinks. He follows with Dr. Samson at [place], and notes report multiple prior food impactions, and being dilated up to 15mm prior; his last dilation appears to be in . He is not on anticoagulation or plavix/brilinta.         PMH/MEDS/ALLERGIES/SH/FH:     Past Medical History:    Atherosclerosis of coronary artery    Stents 3    BLOOD CLOTS    Colitis    Deaf    Heart attack (HCC)    NSTEMI    High blood pressure    High cholesterol    Kidney stone    Pulmonary embolism (HCC)    DVT & PE POst  Fractured Metatarsil    Stroke (HCC)      Past Surgical History:   Procedure Laterality Date    Angiogram      Angioplasty (coronary)  16      Stents LAD, CIRC, RCA    Cath cartotid stent      Cath drug eluting stent      Prostate biopsies          No recently discontinued medications to reconcile   Medications Ordered Prior to Encounter[1]    Current Allergies: Allergies[2]    Social History     Occupational History    Not on file   Tobacco Use    Smoking status: Former     Types: Cigarettes    Smokeless tobacco: Not on file   Vaping Use    Vaping status: Never Used   Substance and Sexual Activity    Alcohol use: Not Currently     Comment: occ    Drug use: No    Sexual activity: Not on file       Family History   Problem Relation Age of Onset    Hypertension Sister     Cancer Brother           MEDICATIONS      aspirin chewable tab 81 mg  81 mg Oral Daily    isosorbide mononitrate ER (Imdur) 24 hr  tab 30 mg  30 mg Oral Daily    lisinopril (Zestril) tab 10 mg  10 mg Oral BID    nitroglycerin (Nitrostat) SL tab 0.4 mg  0.4 mg Sublingual Q5 Min PRN    pantoprazole (Protonix) DR tab 40 mg  40 mg Oral BID AC    rosuvastatin (Crestor) tab 10 mg  10 mg Oral Daily    tamsulosin (Flomax) cap 0.4 mg  0.4 mg Oral Nightly    acetaminophen (Tylenol Extra Strength) tab 500 mg  500 mg Oral Q4H PRN    melatonin tab 3 mg  3 mg Oral Nightly PRN    glycerin-hypromellose- (Artificial Tears) 0.2-0.2-1 % ophthalmic solution 1 drop  1 drop Both Eyes QID PRN    sodium chloride (Saline Mist) 0.65 % nasal solution 1 spray  1 spray Each Nare Q3H PRN    sodium chloride 0.9% infusion   Intravenous Continuous    enoxaparin (Lovenox) 40 MG/0.4ML SUBQ injection 40 mg  40 mg Subcutaneous Daily    polyethylene glycol (PEG 3350) (Miralax) 17 g oral packet 17 g  17 g Oral Daily PRN    sennosides (Senokot) tab 17.2 mg  17.2 mg Oral Nightly PRN    bisacodyl (Dulcolax) 10 MG rectal suppository 10 mg  10 mg Rectal Daily PRN    fleet enema (Fleet) rectal enema 133 mL  1 enema Rectal Once PRN    ondansetron (Zofran) 4 MG/2ML injection 4 mg  4 mg Intravenous Q6H PRN    metoclopramide (Reglan) 5 mg/mL injection 5 mg  5 mg Intravenous Q8H PRN    lactated ringers infusion   Intravenous Continuous    lactated ringers IV bolus 500 mL  500 mL Intravenous Once PRN    acetaminophen (Tylenol Extra Strength) tab 1,000 mg  1,000 mg Oral Once PRN    Or    HYDROcodone-acetaminophen (Norco) 5-325 MG per tab 1 tablet  1 tablet Oral Once PRN    Or    HYDROcodone-acetaminophen (Norco) 5-325 MG per tab 2 tablet  2 tablet Oral Once PRN    labetalol (Trandate) 5 mg/mL injection 5 mg  5 mg Intravenous Q5 Min PRN    naloxone (Narcan) 0.4 MG/ML injection 80 mcg  80 mcg Intravenous PRN    fentaNYL (Sublimaze) 50 mcg/mL injection 25 mcg  25 mcg Intravenous Q5 Min PRN    Or    fentaNYL (Sublimaze) 50 mcg/mL injection 50 mcg  50 mcg Intravenous Q5 Min PRN     HYDROmorphone (Dilaudid) 1 MG/ML injection 0.2 mg  0.2 mg Intravenous Q5 Min PRN    Or    HYDROmorphone (Dilaudid) 1 MG/ML injection 0.4 mg  0.4 mg Intravenous Q5 Min PRN    Or    HYDROmorphone (Dilaudid) 1 MG/ML injection 0.6 mg  0.6 mg Intravenous Q5 Min PRN    ondansetron (Zofran) 4 MG/2ML injection 4 mg  4 mg Intravenous Q6H PRN    [COMPLETED] sodium chloride 0.9 % IV bolus 500 mL  500 mL Intravenous Once    [COMPLETED] ondansetron (Zofran) 4 MG/2ML injection 4 mg  4 mg Intravenous Once    [COMPLETED] iopamidol 76% (ISOVUE-370) injection for power injector  65 mL Intravenous ONCE PRN    [COMPLETED] metoclopramide (Reglan) 5 mg/mL injection 5 mg  5 mg Intravenous Once              ROS:     A comprehensive 14 point review of systems was completed.  Pertinent positives and negatives noted in the the HPI.          Physical Exam     Vital signs:  BP 94/65 (BP Location: Left arm)   Pulse 73   Temp 98.5 °F (36.9 °C) (Oral)   Resp 18   Ht 5' 6\" (1.676 m)   Wt 140 lb (63.5 kg)   SpO2 93%   BMI 22.60 kg/m²         Physical Exam        General: Appears alert, oriented x 3 and in no acute distress.  HEENT: Normal. No scleral icterus.   NECK: Supple. No neck vein distention.   CV: S1 and S2 normal. No murmurs or gallops.  LUNGS: Clear to percussion and auscultation.  ABDOMEN: Soft and non-distended. Non-tender. No masses. Bowel sounds are present.  BACK: No CVA tenderness.  EXTREMITIES: No edema, cyanosis or clubbing.  SKIN: Warm and dry.         IMAGING/LABS       Labs:   Lab Results   Component Value Date    WBC 10.9 02/15/2025    HGB 16.2 02/15/2025    HCT 48.1 02/15/2025    .0 02/15/2025    CREATSERUM 0.87 02/15/2025    BUN 24 02/15/2025     02/15/2025    K 4.4 02/15/2025     02/15/2025    CO2 23.0 02/15/2025     02/15/2025    CA 9.1 02/15/2025    ALB 4.2 02/15/2025    ALKPHO 58 02/15/2025    BILT 1.1 02/15/2025    AST 26 02/15/2025    ALT 14 02/15/2025    LIP 38 02/15/2025     Recent  Labs   Lab 02/15/25  2002   *   BUN 24*   CREATSERUM 0.87   CA 9.1      K 4.4      CO2 23.0     Recent Labs   Lab 02/15/25  1909   RBC 5.35   HGB 16.2   HCT 48.1   MCV 89.9   MCH 30.3   MCHC 33.7   RDW 15.0   NEPRELIM 7.80*   WBC 10.9   .0       Recent Labs   Lab 02/15/25  2002   ALT 14   AST 26       Imaging:   CT ABDOMEN+PELVIS(CONTRAST ONLY)(CPT=74177)  Narrative: PROCEDURE:  CT ABDOMEN+PELVIS (CONTRAST ONLY) (CPT=74177)     COMPARISON:  [facility], CT, CT ABDOMEN+PELVIS(CONTRAST ONLY)(CPT=74177), 7/06/2016, 4:41 PM.     INDICATIONS:  Vomiting     TECHNIQUE:  CT scanning was performed from the dome of the diaphragm to the pubic symphysis with non-ionic intravenous contrast material. Post contrast coronal MPR imaging was performed.  Dose reduction techniques were used. Dose information is   transmitted to the ACR (American College of Radiology) NRDR (National Radiology Data Registry) which includes the Dose Index Registry.     PATIENT STATED HISTORY:(As transcribed by Technologist)  Patient here with vomiting that started last night. States some generalized abdominal pain.      CONTRAST USED:  65cc of Isovue 370     FINDINGS:    LIVER:  There is heterogeneous enhancement of the liver suggesting diffuse hepatocellular disease such as cirrhosis.  There is a stable 5 mm low CT density focus within the superior aspect of hepatic segment 8.  There is a 1.3 cm cyst along the   undersurface of hepatic segment 4.    BILIARY:                           There is cholelithiasis with a large gallstones filling the gallbladder measuring approximately 2.5 cm.  There is some extrahepatic biliary tree dilation involving the common bile duct maximum dimension 1.1 cm There is   smooth tapering distally.  PANCREAS:  There is marked atrophy with some prominence to the duct in the body and tail the pancreas maximum dimension 3.9 mm unchanged.  No acute pancreatitis.  SPLEEN:  No enlargement or focal lesion.     KIDNEYS:                         Bilateral renal cortical scarring mild in degree.  No hydronephrosis or significant mass.  No nephrolithiasis.  ADRENALS:  No mass or enlargement.    AORTA/VASCULAR:  No aneurysm or dissection.  Marked vascular calcification with narrowing of the origin of the celiac artery with poststenotic dilation.  There is marked narrowing of the SMA with marked narrowing of the renal arteries bilaterally.  No   dissection.  Ectasia of the common iliac arteries right greater than left.  RETROPERITONEUM:  No mass or adenopathy.    BOWEL/MESENTERY:  Moderate-sized hiatal hernia large amount of fluid in the distal incompletely visualized esophagus consistent with reflux.  Moderate to severe diverticulosis of left and sigmoid colon with moderate upstream retained stool without   obstruction.  No free air free fluid  ABDOMINAL WALL:  No mass or hernia.    URINARY BLADDER:  Mild bladder wall thickening suggesting chronic outlet obstruction.    PELVIC NODES:  No adenopathy.    PELVIC ORGANS:  Moderate enlargement of the prostate deforming the bladder base.    BONES:  Marked multiple level degenerative disc disease without acute fracture.  Degenerative change of the hip joints and SI joints also noted.    LUNG BASES:  Stable fibrotic changes in the lung bases.  Tiny calcified granuloma in the RLL unchanged.  Marked coronary artery calcification.  OTHER:  Negative.                     Impression: CONCLUSION:    1. Marked uncomplicated diverticulosis with no obstruction.  2. Moderate-sized hiatal hernia with large amount of fluid in the distal esophagus consistent with reflux.  3. Cirrhotic changes of the liver noted.  4. Moderate enlargement of the prostate deforming the bladder base.  5. Marked vasculopathy.  Marked narrowing of the SMA and origin of the renal arteries.  6. Uncomplicated cholelithiasis.          LOCATION:  [place]        Dictated by (CST): Zac Goss MD on 2/15/2025 at 10:37 PM        Finalized by (CST): Zac Goss MD on 2/15/2025 at 10:45 PM     XR CHEST AP PORTABLE  (CPT=71045)  Narrative: PROCEDURE:  XR CHEST AP PORTABLE  (CPT=71045)     TECHNIQUE:  AP chest radiograph was obtained.     COMPARISON:  [facility], XR, XR CHEST AP PORTABLE  (CPT=71045), 6/03/2024, 11:10 PM.  [facility], XR, XR CHEST AP PORTABLE  (CPT=71010), 9/18/2016, 12:40 PM.     INDICATIONS:  vomiting/cough     PATIENT STATED HISTORY: (As transcribed by Technologist)  Patient complains of vomiting and coughing for 1 day. Patient has had cardiac stent placements.                           Impression: CONCLUSION:    The heart size is upper limits of normal.  No CHF, effusion or pneumothorax.  Minimal reticular changes in the lung bases consistent with discoid atelectasis.  Pneumonia considered unlikely.  No consolidation.  No pneumothorax.  No lymphadenopathy.        LOCATION:  [place]                 Dictated by (CST): Zac Goss MD on 2/15/2025 at 10:07 PM       Finalized by (CST): Zac Goss MD on 2/15/2025 at 10:08 PM        2/16 EGD: Findings and Therapeutics:  Esophagus:   There was a large food impaction in the distal esophagus. The endoscope was removed and an overtube was inserted into the esophagus. Then, attempts were made to remove the impaction with a banding cap, which were unsuccessful. After this, multiple pieces were removed with a rat tooth and a large forceps. After partial removal, the food bolus was able to be pushed forward and displaced into the stomach. A 2cm distal esophageal stricture was noted from 35 to 33cm from the incisors. There was severe esophagitis and mucosal rent from the impacted food bolus, which was located just proximal to the stricture.  GEJ junction traversed with endoscope without resistance.  The Z-line was irregular, appreciated at 35 cm from the incisors. The diaphragmatic impression was located at 37 cm from the incisors.     Stomach:    The gastric  body, antrum, fundus, cardia, and angularis were normal. No ulcers, erosions or masses visualized. Endoscope was placed in a retroflexion view in the stomach. There was evidence of a small hiatal hernia.   Duodenum:   The entire examined duodenum was normal.         IMPRESSION:   Mr Mijares is a 89 year old male with a history of GERD, peptic stricture, Ontiveros's, CAD, and VTE who presented with vomiting and dsyphagia due to food impaction, s/p EGD with removal of food impaction on 2/16.             PLAN:   Post EGD precautions, watch for bleeding, infection, perforation, adverse drug reactions   Pantoprazole 40mg po bid  Avoid non-aspirin NSAID  Clear liquid diet only, do not advance today; if tolerating, would only advance to pureed foods on 2/17 given high risk for recurrent food impaction, until PPI can help mucosal heal and patient can have his stricture dilated. He follows with Dr. Samson at [facility]. Recommend PPI BID and repeat EGD for dilation in 2 weeks with Dr. Samson.           Yobani Corral MD  2:56 PM  2/16/2025  [facility]  [phone]                    [1]   No current facility-administered medications on file prior to encounter.     Current Outpatient Medications on File Prior to Encounter   Medication Sig Dispense Refill    tamsulosin 0.4 MG Oral Cap TAKE 1 CAPSULE(0.4 MG) BY MOUTH EVERY NIGHT. SWALLOW WHOLE      lisinopril 10 MG Oral Tab Take 1 tablet (10 mg total) by mouth 2 (two) times daily.      isosorbide mononitrate ER 30 MG Oral Tablet 24 Hr Take 1 tablet (30 mg total) by mouth daily.      pantoprazole 40 MG Oral Tab EC Take 1 tablet (40 mg total) by mouth 2 (two) times daily before meals.      Rosuvastatin Calcium (CRESTOR) 10 MG Oral Tab Take 1 tablet (10 mg total) by mouth daily.      aspirin 81 MG Oral Chew Tab Chew 1 tablet (81 mg total) by mouth daily.      NITROSTAT 0.4 MG Sublingual SL Tab nitroGLYcerin (NITROSTAT) 0.4 MG sublingual tablet, [RxNorm: 259619]       multivitamin Oral Tab Take 1 tablet by mouth at bedtime.     [2] No Known Allergies

## 2025-02-17 ENCOUNTER — APPOINTMENT (OUTPATIENT)
Dept: GENERAL RADIOLOGY | Facility: HOSPITAL | Age: OVER 89
DRG: 393 | End: 2025-02-17
Attending: HOSPITALIST
Payer: MEDICARE

## 2025-02-17 ENCOUNTER — APPOINTMENT (OUTPATIENT)
Dept: GENERAL RADIOLOGY | Facility: HOSPITAL | Age: OVER 89
End: 2025-02-17
Attending: HOSPITALIST
Payer: MEDICARE

## 2025-02-17 PROBLEM — K22.2 ESOPHAGEAL STRICTURE: Chronic | Status: ACTIVE | Noted: 2025-02-17

## 2025-02-17 PROBLEM — T18.128A FOOD IMPACTION OF ESOPHAGUS: Status: ACTIVE | Noted: 2025-02-17
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
## 2025-02-17 PROBLEM — I48.91 NEW ONSET ATRIAL FIBRILLATION (HCC): Status: ACTIVE | Noted: 2025-02-17

## 2025-02-17 LAB
ANION GAP SERPL CALC-SCNC: 10 MMOL/L (ref 0–18)
ANION GAP SERPL CALC-SCNC: 11 MMOL/L (ref 0–18)
APTT PPP: 28.2 SECONDS (ref 23–36)
APTT PPP: 62.9 SECONDS (ref 23–36)
ATRIAL RATE: 138 BPM
BASOPHILS # BLD AUTO: 0.02 X10(3) UL (ref 0–0.2)
BASOPHILS NFR BLD AUTO: 0.2 %
BUN BLD-MCNC: 24 MG/DL (ref 9–23)
BUN BLD-MCNC: 26 MG/DL (ref 9–23)
CALCIUM BLD-MCNC: 8.8 MG/DL (ref 8.7–10.6)
CALCIUM BLD-MCNC: 9 MG/DL (ref 8.7–10.6)
CHLORIDE SERPL-SCNC: 108 MMOL/L (ref 98–112)
CHLORIDE SERPL-SCNC: 109 MMOL/L (ref 98–112)
CO2 SERPL-SCNC: 24 MMOL/L (ref 21–32)
CO2 SERPL-SCNC: 25 MMOL/L (ref 21–32)
CREAT BLD-MCNC: 0.77 MG/DL
CREAT BLD-MCNC: 0.78 MG/DL
EGFRCR SERPLBLD CKD-EPI 2021: 81 ML/MIN/1.73M2 (ref 60–?)
EGFRCR SERPLBLD CKD-EPI 2021: 81 ML/MIN/1.73M2 (ref 60–?)
EOSINOPHIL # BLD AUTO: 0.01 X10(3) UL (ref 0–0.7)
EOSINOPHIL NFR BLD AUTO: 0.1 %
ERYTHROCYTE [DISTWIDTH] IN BLOOD BY AUTOMATED COUNT: 14.7 %
ERYTHROCYTE [DISTWIDTH] IN BLOOD BY AUTOMATED COUNT: 15.1 %
GLUCOSE BLD-MCNC: 109 MG/DL (ref 70–99)
GLUCOSE BLD-MCNC: 111 MG/DL (ref 70–99)
GLUCOSE BLD-MCNC: 137 MG/DL (ref 70–99)
HCT VFR BLD AUTO: 40.4 %
HCT VFR BLD AUTO: 41.1 %
HGB BLD-MCNC: 13.2 G/DL
HGB BLD-MCNC: 13.2 G/DL
IMM GRANULOCYTES # BLD AUTO: 0.09 X10(3) UL (ref 0–1)
IMM GRANULOCYTES NFR BLD: 0.8 %
LACTATE SERPL-SCNC: 1.7 MMOL/L (ref 0.5–2)
LACTATE SERPL-SCNC: 2.6 MMOL/L (ref 0.5–2)
LYMPHOCYTES # BLD AUTO: 1.38 X10(3) UL (ref 1–4)
LYMPHOCYTES NFR BLD AUTO: 13 %
MAGNESIUM SERPL-MCNC: 2 MG/DL (ref 1.6–2.6)
MCH RBC QN AUTO: 29.1 PG (ref 26–34)
MCH RBC QN AUTO: 29.9 PG (ref 26–34)
MCHC RBC AUTO-ENTMCNC: 32.1 G/DL (ref 31–37)
MCHC RBC AUTO-ENTMCNC: 32.7 G/DL (ref 31–37)
MCV RBC AUTO: 90.7 FL
MCV RBC AUTO: 91.4 FL
MONOCYTES # BLD AUTO: 0.72 X10(3) UL (ref 0.1–1)
MONOCYTES NFR BLD AUTO: 6.8 %
NEUTROPHILS # BLD AUTO: 8.43 X10 (3) UL (ref 1.5–7.7)
NEUTROPHILS # BLD AUTO: 8.43 X10(3) UL (ref 1.5–7.7)
NEUTROPHILS NFR BLD AUTO: 79.1 %
OSMOLALITY SERPL CALC.SUM OF ELEC: 302 MOSM/KG (ref 275–295)
OSMOLALITY SERPL CALC.SUM OF ELEC: 303 MOSM/KG (ref 275–295)
PLATELET # BLD AUTO: 173 10(3)UL (ref 150–450)
PLATELET # BLD AUTO: 176 10(3)UL (ref 150–450)
POTASSIUM SERPL-SCNC: 3.9 MMOL/L (ref 3.5–5.1)
POTASSIUM SERPL-SCNC: 3.9 MMOL/L (ref 3.5–5.1)
Q-T INTERVAL: 336 MS
QRS DURATION: 98 MS
QTC CALCULATION (BEZET): 448 MS
R AXIS: -77 DEGREES
RBC # BLD AUTO: 4.42 X10(6)UL
RBC # BLD AUTO: 4.53 X10(6)UL
SODIUM SERPL-SCNC: 143 MMOL/L (ref 136–145)
SODIUM SERPL-SCNC: 144 MMOL/L (ref 136–145)
T AXIS: 100 DEGREES
VENTRICULAR RATE: 107 BPM
WBC # BLD AUTO: 10.7 X10(3) UL (ref 4–11)
WBC # BLD AUTO: 12.3 X10(3) UL (ref 4–11)

## 2025-02-17 PROCEDURE — 99232 SBSQ HOSP IP/OBS MODERATE 35: CPT | Performed by: HOSPITALIST

## 2025-02-17 PROCEDURE — 71045 X-RAY EXAM CHEST 1 VIEW: CPT | Performed by: HOSPITALIST

## 2025-02-17 RX ORDER — HEPARIN SODIUM AND DEXTROSE 10000; 5 [USP'U]/100ML; G/100ML
INJECTION INTRAVENOUS CONTINUOUS
Status: DISCONTINUED | OUTPATIENT
Start: 2025-02-17 | End: 2025-02-21

## 2025-02-17 RX ORDER — SODIUM CHLORIDE 9 MG/ML
INJECTION, SOLUTION INTRAVENOUS CONTINUOUS
Status: DISCONTINUED | OUTPATIENT
Start: 2025-02-17 | End: 2025-02-22

## 2025-02-17 RX ORDER — HEPARIN SODIUM AND DEXTROSE 10000; 5 [USP'U]/100ML; G/100ML
12 INJECTION INTRAVENOUS ONCE
Status: COMPLETED | OUTPATIENT
Start: 2025-02-17 | End: 2025-02-17

## 2025-02-17 RX ORDER — HEPARIN SODIUM 1000 [USP'U]/ML
60 INJECTION, SOLUTION INTRAVENOUS; SUBCUTANEOUS ONCE
Status: COMPLETED | OUTPATIENT
Start: 2025-02-17 | End: 2025-02-17

## 2025-02-17 NOTE — PLAN OF CARE
Patient alert and oriented x4. Abdomen soft. SCDs, ankle pumps encouraged. Denies n/v. Clear liquid diet. Up x1. Voiding in bathroom.     0912: pt heart rate running in 130s-140s. BP 78/57. Pt denies shortness of breath or dizziness. Dr. Steele at bedside. 1L bolus started per order, tele monitoring applied.   0928: Per tele patient in afib RVR with left bundle switch. Dr. Steele paged. EKG complete. Per Dr. Steele transfer patient to tele floor for cardizem and heparin drip. Cardiology consulted.     1100: patient to transfer to room 7615. Report given to Deb WOMACK, all questions answered. Deb WOMACK to start cardizem drip.   1130: Patients daughter Lynette called and updated per patient request.

## 2025-02-17 NOTE — PROGRESS NOTES
[facility]                       Gastroenterology Follow up Note - [facility]    Beba Mijares Patient Status:  Inpatient    1926 MRN [id number]   Location [facility] 7NE-A Attending [place]*   Hosp Day # 1 PCP CLARK HIGGINS     Reason for consultation: Dysphagia, vomiting, food impaction  Subjective: Patient seen and examined.  He is status post EGD with removal of food impaction yesterday.  He is tolerating clear liquid diet without difficulty.  He denies abdominal pain, nausea, emesis.  But  Review of Systems:   10 point ROS completed and was negative, except for pertinent positive and negatives stated in subjective.    For PMH, PSH, FHx and SHx- please see initial consult note.     Objective: /56 (BP Location: Left arm)   Pulse 74   Temp 97.8 °F (36.6 °C) (Oral)   Resp 20   Ht 5' 6\" (1.676 m)   Wt 140 lb (63.5 kg)   SpO2 95%   BMI 22.60 kg/m²   Gen: No acute distress  Resp: no respiratory distress  Abd: Soft, non-tender, non-distended. No rebound tenderness, no guarding.   Neuro: Aox3.     Labs:   Lab Results   Component Value Date    WBC 12.3 2025    HGB 13.2 2025    HCT 40.4 2025    .0 2025    CREATSERUM 0.77 2025    BUN 26 2025     2025    K 3.9 2025     2025    CO2 24.0 2025     2025    CA 8.8 2025    PTT 62.9 2025    MG 2.0 2025     Recent Labs   Lab 02/15/25  2002 02/17/25  04225  0949   * 137* 111*   BUN 24* 24* 26*   CREATSERUM 0.87 0.78 0.77   CA 9.1 9.0 8.8    143 144   K 4.4 3.9 3.9    108 109   CO2 23.0 25.0 24.0     Recent Labs   Lab 25  0426 25  0949   RBC 4.53 4.42   HGB 13.2 13.2   HCT 41.1 40.4   MCV 90.7 91.4   MCH 29.1 29.9   MCHC 32.1 32.7   RDW 14.7 15.1   NEPRELIM 8.43*  --    WBC 10.7 12.3*   .0 176.0       Recent Labs   Lab 02/15/25  2002   ALT 14   AST 26        Assessment:  Mr Mijares is a 89 year old male with a history of GERD, peptic stricture, Ontiveros's, CAD, and VTE who presented with vomiting and dsyphagia due to food impaction, s/p EGD with removal of food impaction on 2/16.    Plan:  Post EGD precautions, watch for bleeding, infection, perforation, adverse drug reactions   Pantoprazole 40mg po bid  Avoid non-aspirin NSAID  Clear liquid diet and if tolerating, can advance to pureed foods today given high risk for recurrent food impaction, until PPI can help mucosal heal and patient can have his stricture dilated.   At this time, GI service will sign off.  He follows with Dr. Samson at [facility]. Recommend PPI BID and repeat EGD for dilation in 2 weeks with Dr. Samson.  Alternatively, if he would like to follow-up here locally, can follow-up with nurse practitioner or Dr. Corral in 3 to 4 weeks.    Thank you for allowing us to participate in patient's care. Please call us with any questions or concerns.     Rosalio Jenkins DO  [facility]  [phone]

## 2025-02-17 NOTE — PLAN OF CARE
Recd pt axox4.  Pt denies nausea or vomiting.  Pt denies pain or distress.   ongoing.  Medicated per MAR.  Up to date on plan of care.  Bed in lowest position, call light within reach.  Pt instructed to call for assistance with ambulation, pt verbalizes understanding.  Pt able to tolerate secretions and po meds with water.  No distress noted, all needs met.

## 2025-02-17 NOTE — PROGRESS NOTES
[facility]   part of [facility]     Hospitalist Progress Note     Beba Mijares Patient Status:  Inpatient    1926 MRN [id number]   Location [facility] 7NE-A Attending [place]*   Hosp Day # 1 PCP CLARK HIGGINS     Chief Complaint: Vomiting     Subjective:     Patient seen and examined at bedside.  No further vomiting.  Patient this morning that was found of a heart rate in the 140s to 50s EKG showed atrial fibrillation with RVR.  Patient's blood pressure 78/52    Objective:    Review of Systems:   A comprehensive review of systems was completed; pertinent positive and negatives stated in subjective.    Vital signs:  Temp:  [97.5 °F (36.4 °C)-98.7 °F (37.1 °C)] 97.6 °F (36.4 °C)  Pulse:  [] 114  Resp:  [14-24] 24  BP: ()/(55-79) 75/59  SpO2:  [90 %-100 %] 95 %    Physical Exam:    General: No acute distress  Respiratory: No wheezes, no rhonchi  Cardiovascular: S1, S2, regular rate and rhythm  Abdomen: Soft, Non-tender, non-distended, positive bowel sounds  Neuro: No new focal deficits.   Extremities: No edema      Diagnostic Data:    Labs:  Recent Labs   Lab 02/15/25  1909 02/17/25  04225  0949   WBC 10.9 10.7 12.3*   HGB 16.2 13.2 13.2   MCV 89.9 90.7 91.4   .0 173.0 176.0       Recent Labs   Lab 02/15/25  2002 02/17/25  0427 25  0949   * 137* 111*   BUN 24* 24* 26*   CREATSERUM 0.87 0.78 0.77   CA 9.1 9.0 8.8   ALB 4.2  --   --     143 144   K 4.4 3.9 3.9    108 109   CO2 23.0 25.0 24.0   ALKPHO 58  --   --    AST 26  --   --    ALT 14  --   --    BILT 1.1*  --   --    TP 7.4  --   --        Estimated Glomerular Filtration Rate: 80.9 mL/min/1.73m2 (by CKD-EPI based on SCr of 0.77 mg/dL).    No results for input(s): \"TROP\", \"TROPHS\", \"CK\" in the last 168 hours.    No results for input(s): \"PTP\", \"INR\" in the last 168 hours.       Microbiology    No results found for this visit on 02/15/25.      Imaging: Reviewed in  Epic.    Medications:    dilTIAZem  5 mg Intravenous Once    sodium chloride  1,000 mL Intravenous Once    aspirin  81 mg Oral Daily    isosorbide mononitrate ER  30 mg Oral Daily    lisinopril  10 mg Oral BID    pantoprazole  40 mg Oral BID AC    rosuvastatin  10 mg Oral Daily    tamsulosin  0.4 mg Oral Nightly    mineral oil-white petrolatum   Both Eyes TID       Assessment & Plan:      # Intractable N/V  Patient unable to tolerate PO  CT abd reviewed, no e/o obstruction   EKG NSR, no acute st changes  CXR clear, no consolidation   IVF's, CLD, ADAT    # New onset atrial fibrillation with RVR  - Transfer to CTU  - cardiology consulted  - ordered cardizem, but if BP does not improve patient may need amiodarone instead    #CAD  ASA, statin, ace, imdur     #Essential HTN  Lisinopril      #GERD  PPI     #HLD  Statin      [place], DO    Supplementary Documentation:     Quality:  DVT Mechanical Prophylaxis:     Early ambuation  DVT Pharmacologic Prophylaxis   Medication    heparin (Porcine) 73084 units/250mL infusion ACS/AFIB CONTINUOUS      DVT Pharmacologic prophylaxis: Aspirin 162 mg         Code Status: Prior  Jennings: No urinary catheter in place  Jennings Duration (in days):   Central line:    ELIN:     Discharge is dependent on: clinical improvement  At this point Mr. Mijares is expected to be discharge to: TBD    The 21st Century Cures Act makes medical notes like these available to patients in the interest of transparency. Please be advised this is a medical document. Medical documents are intended to carry relevant information, facts as evident, and the clinical opinion of the practitioner. The medical note is intended as peer to peer communication and may appear blunt or direct. It is written in medical language and may contain abbreviations or verbiage that are unfamiliar.

## 2025-02-17 NOTE — PROGRESS NOTES
Pt returned to room 375 from PACU. Pt A&Ox4, VSS on room air. Pt denies pain at this time. On clear liquid diet. Safety precautions in place. Family updated on POC.

## 2025-02-17 NOTE — CONSULTS
[facility] Cardiology  Report of Consultation    Beba Mijares Patient Status:  Inpatient    1926 MRN [id number]   Location [facility] 7NE-A Attending [place]*   Hosp Day # 1 PCP CLARK HIGGINS     Reason for Consultation:  Urgent consult for hypotension    History of Present Illness:  Beba Mijares is a a(n) 89 year old male with history of remote coronary artery disease, hypertension, hyperlipidemia, bradycardia who was admitted with an impaction of food in his esophagus.Patient was seen by GI yesterday and had this removed.  According to family, patient really had needed anything in the last few days.    Today, patient noted to be hypotensive and having episodes of atrial fibrillation.  Heart rates were in the 140s.  Rapid response was called.  On my arrival, heart rates were variable in the 120s.  It appears to be sinus.  Patient himself was asymptomatic.    He denies any recent illnesses.    History:  Past Medical History:    Atherosclerosis of coronary artery    Stents 3    BLOOD CLOTS    Colitis    Deaf    Heart attack (HCC)    NSTEMI    High blood pressure    High cholesterol    Kidney stone    Pulmonary embolism (HCC)    DVT & PE POst  Fractured Metatarsil    Stroke (HCC)     Past Surgical History:   Procedure Laterality Date    Angiogram      Angioplasty (coronary)  16      Stents LAD, CIRC, RCA    Cath cartotid stent      Cath drug eluting stent      Prostate biopsies       Family History   Problem Relation Age of Onset    Hypertension Sister     Cancer Brother       reports that he has quit smoking. His smoking use included cigarettes. He does not have any smokeless tobacco history on file. He reports that he does not currently use alcohol. He reports that he does not use drugs.    Allergies:  Allergies[1]    Medications:    Current Facility-Administered Medications:     dilTIAZem 5 mg BOLUS FROM BAG infusion, 5 mg, Intravenous, Once **AND** dilTIAZem (cardIZEM) 100 mg in  sodium chloride 0.9% 100 mL IVPB-ADDV, 2.5-20 mg/hr, Intravenous, Continuous    heparin (Porcine) 00671 units/250mL infusion ACS/AFIB CONTINUOUS, 200-3,000 Units/hr, Intravenous, Continuous    sodium chloride 0.9% infusion, , Intravenous, Continuous    aspirin chewable tab 81 mg, 81 mg, Oral, Daily    isosorbide mononitrate ER (Imdur) 24 hr tab 30 mg, 30 mg, Oral, Daily    lisinopril (Zestril) tab 10 mg, 10 mg, Oral, BID    nitroglycerin (Nitrostat) SL tab 0.4 mg, 0.4 mg, Sublingual, Q5 Min PRN    pantoprazole (Protonix) DR tab 40 mg, 40 mg, Oral, BID AC    rosuvastatin (Crestor) tab 10 mg, 10 mg, Oral, Daily    tamsulosin (Flomax) cap 0.4 mg, 0.4 mg, Oral, Nightly    acetaminophen (Tylenol Extra Strength) tab 500 mg, 500 mg, Oral, Q4H PRN    melatonin tab 3 mg, 3 mg, Oral, Nightly PRN    glycerin-hypromellose- (Artificial Tears) 0.2-0.2-1 % ophthalmic solution 1 drop, 1 drop, Both Eyes, QID PRN    sodium chloride (Saline Mist) 0.65 % nasal solution 1 spray, 1 spray, Each Nare, Q3H PRN    sodium chloride 0.9% infusion, , Intravenous, Continuous    polyethylene glycol (PEG 3350) (Miralax) 17 g oral packet 17 g, 17 g, Oral, Daily PRN    sennosides (Senokot) tab 17.2 mg, 17.2 mg, Oral, Nightly PRN    bisacodyl (Dulcolax) 10 MG rectal suppository 10 mg, 10 mg, Rectal, Daily PRN    fleet enema (Fleet) rectal enema 133 mL, 1 enema, Rectal, Once PRN    ondansetron (Zofran) 4 MG/2ML injection 4 mg, 4 mg, Intravenous, Q6H PRN    metoclopramide (Reglan) 5 mg/mL injection 5 mg, 5 mg, Intravenous, Q8H PRN    mineral oil-white petrolatum (Artificial Tears) 83-15 % ophthalmic ointment, , Both Eyes, TID    Review of Systems:  A comprehensive 10 point review of systems was completed.  Pertinent positives and negatives noted in the the HPI.     Physical Exam:  Blood pressure (!) 84/70, pulse 114, temperature 97.6 °F (36.4 °C), temperature source Oral, resp. rate 24, height 5' 6\" (1.676 m), weight 140 lb (63.5 kg), SpO2  95%.  Temp (24hrs), Av.1 °F (36.7 °C), Min:97.5 °F (36.4 °C), Max:98.7 °F (37.1 °C)    Wt Readings from Last 3 Encounters:   25 140 lb (63.5 kg)   24 142 lb 3.2 oz (64.5 kg)   24 140 lb (63.5 kg)       Telemetry: Currently sinus  General: Alert and oriented in no apparent distress.  HEENT: EOMI, no scleral icterus, mucosa moist  Neck: Supple, carotids 2+   Cardiac: Regular rate and rhythm   Lungs: Clear   Abdomen: Soft, non-tender.   Extremities: Without clubbing, cyanosis or edema  Neurologic: Alert and oriented, normal affect.  Skin: Warm and dry.     Laboratories and Data:  Diagnostics:    Labs:   No results for input(s): \"TROP\", \"CK\" in the last 168 hours.   Recent Labs   Lab 02/15/25  19025  0949   RBC 5.35 4.53 4.42   HGB 16.2 13.2 13.2   HCT 48.1 41.1 40.4   MCV 89.9 90.7 91.4   MCH 30.3 29.1 29.9   MCHC 33.7 32.1 32.7   RDW 15.0 14.7 15.1   NEPRELIM 7.80* 8.43*  --    WBC 10.9 10.7 12.3*   .0 173.0 176.0     Recent Labs   Lab 02/15/25  2002 02/17/25  0427 02/17/25  0949   * 137* 111*   BUN 24* 24* 26*   CREATSERUM 0.87 0.78 0.77   CA 9.1 9.0 8.8   ALB 4.2  --   --     143 144   K 4.4 3.9 3.9    108 109   CO2 23.0 25.0 24.0   ALKPHO 58  --   --    AST 26  --   --    ALT 14  --   --    BILT 1.1*  --   --    TP 7.4  --   --       Lab Results   Component Value Date    INR 1.05 2016    INR 1.03 2016        Assessment/Plan:    CV -patient had hypertension this morning.  I suspect this is due to dehydration based on his clinical presentation.  He also episodes of atrial dysrhythmias.  He has not gotten 2 L of fluid with systolic blood pressures that have risen to about 110.  He appears comfortable.  Heart rates now are sinus.  Patient is not known to have normal LV function based on an echo last year.  Otherwise has been quite well compensated.  Today, we will continue with hydration and hold his usual meds and keep him on  telemetry.  Food impaction -status post removal yesterday.  Hyperlipidemia -continue statin    Reviewed with patient and family at bedside as well as nursing staff.    Dusty Dennis MD  2/17/2025  12:56 PM  CCT 35 min       [1] No Known Allergies

## 2025-02-17 NOTE — PLAN OF CARE
RRT called d/t persistent hypotension despite fluid bolus. Patient was in Afib with rates verying from 90 to 140. Patient then converted to NSR without intervention.

## 2025-02-18 LAB
ADENOVIRUS PCR:: NOT DETECTED
APTT PPP: 56.4 SECONDS (ref 23–36)
B PARAPERT DNA SPEC QL NAA+PROBE: NOT DETECTED
B PERT DNA SPEC QL NAA+PROBE: NOT DETECTED
C PNEUM DNA SPEC QL NAA+PROBE: NOT DETECTED
CORONAVIRUS 229E PCR:: NOT DETECTED
CORONAVIRUS HKU1 PCR:: NOT DETECTED
CORONAVIRUS NL63 PCR:: NOT DETECTED
CORONAVIRUS OC43 PCR:: NOT DETECTED
FLUAV RNA SPEC QL NAA+PROBE: NOT DETECTED
FLUBV RNA SPEC QL NAA+PROBE: NOT DETECTED
METAPNEUMOVIRUS PCR:: NOT DETECTED
MYCOPLASMA PNEUMONIA PCR:: NOT DETECTED
PARAINFLUENZA 1 PCR:: NOT DETECTED
PARAINFLUENZA 2 PCR:: NOT DETECTED
PARAINFLUENZA 3 PCR:: NOT DETECTED
PARAINFLUENZA 4 PCR:: NOT DETECTED
PLATELET # BLD AUTO: 141 10(3)UL (ref 150–450)
RHINOVIRUS/ENTERO PCR:: NOT DETECTED
RSV RNA SPEC QL NAA+PROBE: NOT DETECTED
SARS-COV-2 RNA NPH QL NAA+NON-PROBE: DETECTED

## 2025-02-18 PROCEDURE — 99232 SBSQ HOSP IP/OBS MODERATE 35: CPT | Performed by: HOSPITALIST

## 2025-02-18 RX ORDER — BENZONATATE 100 MG/1
100 CAPSULE ORAL 3 TIMES DAILY PRN
Status: DISCONTINUED | OUTPATIENT
Start: 2025-02-18 | End: 2025-02-22

## 2025-02-18 NOTE — SLP NOTE
ADULT SWALLOWING EVALUATION    ASSESSMENT    ASSESSMENT/OVERALL IMPRESSION:  Patient is a 89 y/o male admitted with nausea and vomiting. PMHx significant for HTN, HLD, and CVA. EGD completed 2/16 to clear bolus impaction. SLP order received to evaluate oropharyngeal swallow. Patient received alert in bedside chair with son present at bedside. Patient has been cleared to advance to a pureed diet. Patient denied overt s/s of aspiration with PO intake and reported good tolerance of clear liquid diet up to this point. Per chart review, GI recommended a pureed diet until esophageal dilation can be performed.    Evaluation limited to thin liquids and pureed solids. However, oropharyngeal swallow appeared largely intact. Bolus acceptance was adequate without evidence of anterior bolus loss. Pharyngeal swallow initiation appeared timely and hyolaryngeal excursion was adequate per palpation.  No overt s/s of aspiration observed.    Recommend patient continue a pureed diet and thin liquids. Advance as tolerated when cleared by GI. No further SLP services recommended at this time as no deficits identified which require skilled intervention. Education provided re: results and recommendations.         RECOMMENDATIONS   Diet Recommendations - Solids: Puree  Diet Recommendations - Liquids: Thin Liquids                              Medication Administration Recommendations: One pill at a time  Treatment Plan/Recommendations: No further inpatient SLP service warranted    HISTORY   MEDICAL HISTORY  Reason for Referral: R/O aspiration    Problem List  Principal Problem:    Food impaction of esophagus  Active Problems:    Coronary artery disease involving native heart with unstable angina pectoris (HCC)    Primary hypertension    Nausea and vomiting, unspecified vomiting type    Dehydration    New onset atrial fibrillation (HCC)    Esophageal stricture      Past Medical History  Past Medical History:    Atherosclerosis of coronary  artery    Stents 3    BLOOD CLOTS    Colitis    Deaf    Heart attack (HCC)    NSTEMI    High blood pressure    High cholesterol    Kidney stone    Pulmonary embolism (HCC)    DVT & PE POst  Fractured Metatarsil    Stroke (HCC)          Diet Prior to Admission: Regular;Thin liquids       Patient/Family Goals: none stated    SWALLOWING HISTORY  Current Diet Consistency:  (clear liquid)  Dysphagia History: as above  Imaging Results:   CXR 2/17/25  FINDINGS:  Diffuse bilateral interstitial prominence is redemonstrated, which may be related to edema versus infectious/inflammatory process, unchanged.  No new focal consolidation.  No significant pleural effusion.  No measurable pneumothorax.    Cardiomediastinal silhouette is stable and within normal limits.  Aortic calcifications are noted.  No acute osseous findings.                   Impression   CONCLUSION:  See above        LOCATION:  [facility]                 Dictated by (CST): Nicholas Lemus MD on 2/17/2025 at 9:57 AM      Finalized by (CST): Nicholas Lemus MD on 2/17/2025 at 9:57 AM       SUBJECTIVE       OBJECTIVE   ORAL MOTOR EXAMINATION  Dentition: Functional  Symmetry: Within Functional Limits  Strength: Within Functional Limits     Range of Motion: Within Functional Limits       Voice Quality: Hoarse  Respiratory Status: Unlabored  Consistencies Trialed: Thin liquids;Puree  Method of Presentation: Self presentation  Patient Positioned: Upright;Midline    Oral Phase of Swallow: Within Functional Limits                      Pharyngeal Phase of Swallow: Within Functional Limits           (Please note: Silent aspiration cannot be evaluated clinically. Videofluoroscopic Swallow Study is required to rule-out silent aspiration.)    Esophageal Phase of Swallow: Complaints consistent with possible esophageal involvement  Comments: d/w RN                  FOLLOW UP  Treatment Plan/Recommendations: No further inpatient SLP service warranted     Follow Up Needed  (Documentation Required): No       Thank you for your referral.   If you have any questions, please contact BRANDIN Prasad

## 2025-02-18 NOTE — RESPIRATORY THERAPY NOTE
Performed respiratory panel nasal swab. Appropriate PPE worn during the procedure. No complications noted.

## 2025-02-18 NOTE — PLAN OF CARE
Assumed patient care 0730  Patient alert and oriented X3-4, forgetful, [place]  Bilateral hearing aides at bedside   On room air, VSS, NSR with PVC on tele  Denies pain   Patient can be up with contact guard assist to chair and bathroom, voiding, no BM this shift  Tolerating clear liquid diet, advanced to Puree  (+)Covid , isolation precautions   IVF/Heparin gtt infusing per order   Cardizem gtt discontinued   Patient and family updated on plan of care     Problem: RESPIRATORY - ADULT  Goal: Achieves optimal ventilation and oxygenation  Description: INTERVENTIONS:  - Assess for changes in respiratory status  - Assess for changes in mentation and behavior  - Position to facilitate oxygenation and minimize respiratory effort  - Oxygen supplementation based on oxygen saturation or ABGs  - Provide Smoking Cessation handout, if applicable  - Encourage broncho-pulmonary hygiene including cough, deep breathe, Incentive Spirometry  - Assess the need for suctioning and perform as needed  - Assess and instruct to report SOB or any respiratory difficulty  - Respiratory Therapy support as indicated  - Manage/alleviate anxiety  - Monitor for signs/symptoms of CO2 retention  Outcome: Progressing     Problem: CARDIOVASCULAR - ADULT  Goal: Maintains optimal cardiac output and hemodynamic stability  Description: INTERVENTIONS:  - Monitor vital signs, rhythm, and trends  - Monitor for bleeding, hypotension and signs of decreased cardiac output  - Evaluate effectiveness of vasoactive medications to optimize hemodynamic stability  - Monitor arterial and/or venous puncture sites for bleeding and/or hematoma  - Assess quality of pulses, skin color and temperature  - Assess for signs of decreased coronary artery perfusion - ex. Angina  - Evaluate fluid balance, assess for edema, trend weights  Outcome: Progressing  Goal: Absence of cardiac arrhythmias or at baseline  Description: INTERVENTIONS:  - Continuous cardiac monitoring, monitor vital  signs, obtain 12 lead EKG if indicated  - Evaluate effectiveness of antiarrhythmic and heart rate control medications as ordered  - Initiate emergency measures for life threatening arrhythmias  - Monitor electrolytes and administer replacement therapy as ordered  Outcome: Progressing

## 2025-02-18 NOTE — PAYOR COMM NOTE
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 Patient is accompanied by his son who assists with history. Patient reports 4-5 episodes of NBNB emesis since last night. He has mild epigastric abd pain with vomiting.  Last bowel movement was yesterday and was normal.  Patient denies any fevers, shortness of breath, chest pain, urinary symptoms.  Mild cough reported in triage but pt denies any cough to me. No known sick contacts. Pt tested negative for covid, flu at . Pt lives at home.     ED Triage Vitals [02/15/25 1850]   BP 96/74   Pulse 72   Resp 20   Temp 98.3 °F (36.8 °C)   Temp src Temporal   SpO2 99 %   O2 Device None (Room air)     Current Vitals:   Vital Signs  BP: 119/82  Pulse: 70  Resp: 18  Temp: 98.3 °F (36.8 °C)  Temp src: Temporal    Oxygen Therapy  SpO2: 96 %  O2 Device: None (Room air)    HENT:      Head: Normocephalic and atraumatic.      Nose: Nose normal.      Mouth/Throat:      Mouth: Mucous membranes are moist.   Eyes:      Extraocular Movements: Extraocular movements intact.      Pupils: Pupils are equal, round, and reactive to light.   Cardiovascular:      Rate and Rhythm: Normal rate and regular rhythm.      Pulses: Normal pulses.   Pulmonary:      Effort: Pulmonary effort is normal. No respiratory distress.      Breath sounds: No wheezing.   Abdominal:      General: There is no distension.      Palpations: Abdomen is soft.      Tenderness: There is no abdominal tenderness.   Musculoskeletal:         General: No swelling. Normal range of motion.      Cervical back: Normal range of motion.   Skin:     General: Skin  is warm and dry.      Capillary Refill: Capillary refill takes less than 2 seconds.   Neurological:      General: No focal deficit present.      Mental Status: He is alert.   Psychiatric:         Mood and Affect: Mood normal.     Labs Reviewed   CBC WITH DIFFERENTIAL WITH PLATELET - Abnormal; Notable for the following components:       Result Value    Neutrophil Absolute Prelim 7.80 (*)     Neutrophil Absolute 7.80 (*)     All other components within normal limits   COMP METABOLIC PANEL (14) - Abnormal; Notable for the following components:    Glucose 124 (*)     BUN 24 (*)     Calculated Osmolality 299 (*)     Bilirubin, Total 1.1 (*)    Admission disposition: 2/15/2025 10:51 PM    Disposition and Plan     Clinical Impression:  1. Nausea and vomiting, unspecified vomiting type    2. Dehydration       Disposition:  Admit  2/15/2025 10:51 pm         History and Physical   History of Present Illness:   Beba Mijares is a 89 year old male with a past medical history of hypertension, hyperlipidemia, CVA who presents to the emergency department with vomiting.  Patient said he was eating lasagna last night, was unable to eat his full meal as he began having upset stomach.  He then began vomiting overnight and into this morning.  He has not been able to      /77 (BP Location: Left arm)   Pulse 81   Temp 97.6 °F (36.4 °C) (Oral)   Resp 18   Ht 5' 6\" (1.676 m)   Wt 100 lb (45.4 kg)   SpO2 91%   BMI 16.14 kg/m²   General: Alert, pleasant   Respiratory: No rhonchi, no wheezes  Cardiovascular: S1, S2. Regular rate and rhythm  Abdomen: Soft, Non-tender, non-distended, positive bowel sounds  Neuro: No new focal deficits  Extremities: No edema     Lab 02/15/25  1909   RBC 5.35   HGB 16.2   HCT 48.1   MCV 89.9   MCH 30.3   MCHC 33.7   RDW 15.0   NEPRELIM 7.80*   WBC 10.9   .0      Lab 02/15/25  2002   *   BUN 24*   CREATSERUM 0.87   EGFRCR 78   CA 9.1   ALB 4.2      K 4.4      CO2 23.0    ALKPHO 58   AST 26   ALT 14   BILT 1.1*   TP 7.4       Assessment & Plan:    #Intractable N/v  Patient unable to tolerate PO  CT abd reviewed, no e/o obstruction   EKG NSR, no acute st changes  CXR clear, no consolidation   IVF's, CLD, ADAT    #CAD  ASA, statin, ace, imdur     #Essential HTN  Lisinopril      #GERD  PPI     #HLD  Statin                 2/17/25      Patient this morning that was found of a heart rate in the 140s to 50s EKG showed atrial fibrillation with RVR.  Patient's blood pressure 78/52     Temp:  [97.5 °F (36.4 °C)-98.7 °F (37.1 °C)] 97.6 °F (36.4 °C)  Pulse:  [] 114  Resp:  [14-24] 24  BP: ()/(55-79) 75/59  SpO2:  [90 %-100 %] 95 %     Respiratory: No wheezes, no rhonchi  Cardiovascular: S1, S2, regular rate and rhythm  Abdomen: Soft, Non-tender, non-distended, positive bowel sounds  Neuro: No new focal deficits.   Extremities: No edema  Lab 02/15/25  1909 02/17/25  0426 02/17/25  0949   WBC 10.9 10.7 12.3*   HGB 16.2 13.2 13.2   MCV 89.9 90.7 91.4   .0 173.0 176.0      Lab 02/15/25  2002 02/17/25  0427 02/17/25  0949   * 137* 111*   BUN 24* 24* 26*   CREATSERUM 0.87 0.78 0.77   CA 9.1 9.0 8.8   ALB 4.2  --   --     143 144   K 4.4 3.9 3.9    108 109   CO2 23.0 25.0 24.0   ALKPHO 58  --   --    AST 26  --   --    ALT 14  --   --    BILT 1.1*  --   --    TP 7.4  --   --        Medications:    dilTIAZem  5 mg Intravenous Once    sodium chloride  1,000 mL Intravenous Once    aspirin  81 mg Oral Daily    isosorbide mononitrate ER  30 mg Oral Daily    lisinopril  10 mg Oral BID    pantoprazole  40 mg Oral BID AC    rosuvastatin  10 mg Oral Daily    tamsulosin  0.4 mg Oral Nightly    mineral oil-white petrolatum   Both Eyes TID       Assessment & Plan:       # Intractable N/V  Patient unable to tolerate PO  CT abd reviewed, no e/o obstruction   EKG NSR, no acute st changes  CXR clear, no consolidation   IVF's, CLD, ADAT     # New onset atrial fibrillation with  RVR  - Transfer to CTU  - cardiology consulted  - ordered cardizem, but if BP does not improve patient may need amiodarone instead    #CAD  ASA, statin, ace, imdur     #Essential HTN  Lisinopril      #GERD  PPI     #HLD  Statin                   CARDIOLOGY  Reason for Consultation:  Urgent consult for hypotension     History of Present Illness:  Beba Mijares is a a(n) 89 year old male with history of remote coronary artery disease, hypertension, hyperlipidemia, bradycardia who was admitted with an impaction of food in his esophagus.Patient was seen by GI yesterday and had this removed.  According to family, patient really had needed anything in the last few days.     Today, patient noted to be hypotensive and having episodes of atrial fibrillation.  Heart rates were in the 140s.  Rapid response was called.  On my arrival, heart rates were variable in the 120s.  It appears to be sinus.  Patient himself was asymptomatic.     Assessment/Plan:     CV -patient had hypertension this morning.  I suspect this is due to dehydration based on his clinical presentation.  He also episodes of atrial dysrhythmias.  He has not gotten 2 L of fluid with systolic blood pressures that have risen to about 110.  He appears comfortable.  Heart rates now are sinus.  Patient is not known to have normal LV function based on an echo last year.  Otherwise has been quite well compensated.  Today, we will continue with hydration and hold his usual meds and keep him on telemetry.  Food impaction -status post removal yesterday.  Hyperlipidemia -continue statin       GASTROENTEROLOGY  Assessment:  Mr Mijares is a 89 year old male with a history of GERD, peptic stricture, Ontiveros's, CAD, and VTE who presented with vomiting and dsyphagia due to food impaction, s/p EGD with removal of food impaction on 2/16.     Plan:  Post EGD precautions, watch for bleeding, infection, perforation, adverse drug reactions   Pantoprazole 40mg po bid  Avoid  non-aspirin NSAID  Clear liquid diet and if tolerating, can advance to pureed foods today given high risk for recurrent food impaction, until PPI can help mucosal heal and patient can have his stricture dilated.     MEDICATIONS ADMINISTERED IN LAST 1 DAY:  mineral oil-white petrolatum (Artificial Tears) 83-15 % ophthalmic ointment       Date Action Dose Route User    2/17/2025 1032 Given (none) Both Eyes Kinza Grimaldo RN          aspirin chewable tab 81 mg       Date Action Dose Route User    2/17/2025 1032 Given 81 mg Oral Kinza Grimaldo RN          benzonatate (Tessalon) cap 100 mg       Date Action Dose Route User    2/18/2025 0535 Given 100 mg Oral AcheElba peter RN          dilTIAZem (cardIZEM) 100 mg in sodium chloride 0.9% 100 mL IVPB-ADDV       Date Action Dose Route User    2/18/2025 0212 Rate/Dose Change 5 mg/hr Intravenous Achebo Nemi O, RN    2/18/2025 0120 Rate/Dose Change 10 mg/hr Intravenous Achebo Nemi O, RN    2/18/2025 0048 Rate/Dose Change 5 mg/hr Intravenous Achebo, Nemi O, RN    2/18/2025 0027 New Bag 2.5 mg/hr Intravenous AcheboElba, RN          pantoprazole (Protonix) DR tab 40 mg       Date Action Dose Route User    2/18/2025 0535 Given 40 mg Oral AcheboElba O, RN    2/17/2025 1711 Given 40 mg Oral Deb Reardon RN          rosuvastatin (Crestor) tab 10 mg       Date Action Dose Route User    2/17/2025 1031 Given 10 mg Oral Kinza Grimaldo RN          sodium chloride 0.9% infusion       Date Action Dose Route User    2/18/2025 0625 Rate/Dose Change (none) Intravenous Achebo Nemi O, RN    2/18/2025 0536 New Bag (none) Intravenous Achebo, Nemi O, RN    2/17/2025 1958 New Bag (none) Intravenous Achebo, Nemi O, RN    2/17/2025 1059 New Bag (none) Intravenous Kinza Grimaldo RN          sodium chloride 0.9 % IV bolus 1,000 mL       Date Action Dose Route User    2/17/2025 1145 New Bag 1,000 mL Intravenous Deb Reardon, RN          tamsulosin (Flomax) cap 0.4 mg        Date Action Dose Route User    2/17/2025 2000 Given 0.4 mg Oral AcheElba peter RN            Vitals (last day)       Date/Time Temp Pulse Resp BP SpO2 Weight O2 Device O2 Flow Rate (L/min) [facility]    02/18/25 0800 100.1 °F (37.8 °C) 80 26 129/68 94 % -- None (Room air) -- LM    02/18/25 0500 98.1 °F (36.7 °C) 78 23 125/81 93 % -- None (Room air) -- VO    02/18/25 0034 -- 133 22 124/83 93 % -- -- -- NA    02/18/25 0000 98.8 °F (37.1 °C) 89 16 110/64 93 % -- None (Room air) -- VO    02/17/25 2000 97.9 °F (36.6 °C) 69 20 116/61 94 % -- None (Room air) -- VO    02/17/25 1600 97.8 °F (36.6 °C) 74 20 112/56 95 % -- None (Room air) --     02/17/25 1220 -- -- -- 84/70 -- -- -- -- MD    02/17/25 1215 -- -- -- 84/56 -- -- -- -- MD    02/17/25 1210 -- -- -- 79/65 -- -- -- -- MD    02/17/25 1205 -- -- -- 100/85 -- -- -- -- MD    02/17/25 1200 97.6 °F (36.4 °C) 103 20 -- 96 % -- None (Room air) --     02/17/25 1200 -- -- -- 91/68 -- -- -- -- MD    02/17/25 1155 -- -- -- 79/66 -- -- -- -- MD    02/17/25 1150 -- -- -- 92/68 -- -- -- -- MD    02/17/25 1145 -- -- -- 67/57 -- -- -- -- MD    02/17/25 1140 -- -- -- 82/65 -- -- -- -- MD    02/17/25 1130 -- 114 24 75/59 95 % -- None (Room air) -- MD    02/17/25 1124 97.6 °F (36.4 °C) 136 19 113/68 90 % -- None (Room air) --     02/17/25 1020 -- 90 -- 92/79 -- -- -- --     02/17/25 0918 -- 142 -- 76/55 95 % -- -- --     02/17/25 0912 -- -- -- 78/57 -- -- -- --     02/17/25 0908 -- 135 -- -- 96 % -- -- --     02/17/25 0904 -- 141 -- -- 93 % -- -- --     02/17/25 0803 98.7 °F (37.1 °C) 62 16 91/64 95 % -- None (Room air) -- EV

## 2025-02-18 NOTE — PROGRESS NOTES
[facility]   part of [facility]     Hospitalist Progress Note     Beba Mijares Patient Status:  Inpatient    1926 MRN [id number]   Location [facility] 7NE-A Attending [place]*   Hosp Day # 2 PCP CLARK HIGGINS     Chief Complaint: Vomiting     Subjective:     Patient seen and examined at bedside.  No further vomiting.  Patient is feeling better this morning.    Objective:    Review of Systems:   A comprehensive review of systems was completed; pertinent positive and negatives stated in subjective.    Vital signs:  Temp:  [97.6 °F (36.4 °C)-100.1 °F (37.8 °C)] 100.1 °F (37.8 °C)  Pulse:  [] 80  Resp:  [16-26] 26  BP: ()/(56-85) 129/68  SpO2:  [93 %-96 %] 94 %    Physical Exam:    General: No acute distress  Respiratory: No wheezes, no rhonchi  Cardiovascular: S1, S2, regular rate and rhythm  Abdomen: Soft, Non-tender, non-distended, positive bowel sounds  Neuro: No new focal deficits.   Extremities: No edema      Diagnostic Data:    Labs:  Recent Labs   Lab 02/15/25  19025  04225  0949 25  0617   WBC 10.9 10.7 12.3*  --    HGB 16.2 13.2 13.2  --    MCV 89.9 90.7 91.4  --    .0 173.0 176.0 141.0*       Recent Labs   Lab 02/15/25  2002 02/17/25  0427 25  0949   * 137* 111*   BUN 24* 24* 26*   CREATSERUM 0.87 0.78 0.77   CA 9.1 9.0 8.8   ALB 4.2  --   --     143 144   K 4.4 3.9 3.9    108 109   CO2 23.0 25.0 24.0   ALKPHO 58  --   --    AST 26  --   --    ALT 14  --   --    BILT 1.1*  --   --    TP 7.4  --   --        Estimated Glomerular Filtration Rate: 80.9 mL/min/1.73m2 (by CKD-EPI based on SCr of 0.77 mg/dL).    No results for input(s): \"TROP\", \"TROPHS\", \"CK\" in the last 168 hours.    No results for input(s): \"PTP\", \"INR\" in the last 168 hours.       Microbiology    No results found for this visit on 02/15/25.      Imaging: Reviewed in Epic.    Medications:    dilTIAZem  5 mg Intravenous Once    aspirin  81 mg  Oral Daily    isosorbide mononitrate ER  30 mg Oral Daily    lisinopril  10 mg Oral BID    pantoprazole  40 mg Oral BID AC    rosuvastatin  10 mg Oral Daily    tamsulosin  0.4 mg Oral Nightly    mineral oil-white petrolatum   Both Eyes TID       Assessment & Plan:      # Intractable N/V  CT abd reviewed, no e/o obstruction   EKG NSR, no acute st changes  CXR clear, no consolidation   IVF's, CLD, ADAT  Pt had food impaction removal on Sunday.      # New onset atrial fibrillation with RVR  - Transfer to CTU  - cardiology consulted  - ordered cardizem, but if BP does not improve patient may need amiodarone instead.    #CAD  ASA, statin, ace, imdur     #Essential HTN  Lisinopril      #GERD  PPI     #HLD  Statin      [place], DO    Supplementary Documentation:     Quality:  DVT Mechanical Prophylaxis:     Early ambuation  DVT Pharmacologic Prophylaxis   Medication    heparin (Porcine) 09076 units/250mL infusion ACS/AFIB CONTINUOUS      DVT Pharmacologic prophylaxis: Aspirin 162 mg         Code Status: Prior  Jennings: No urinary catheter in place  Jennings Duration (in days):   Central line:    ELIN:     Discharge is dependent on: clinical improvement  At this point Mr. Mijares is expected to be discharge to: TBD    The 21st Century Cures Act makes medical notes like these available to patients in the interest of transparency. Please be advised this is a medical document. Medical documents are intended to carry relevant information, facts as evident, and the clinical opinion of the practitioner. The medical note is intended as peer to peer communication and may appear blunt or direct. It is written in medical language and may contain abbreviations or verbiage that are unfamiliar.

## 2025-02-18 NOTE — PLAN OF CARE
Received pt at 1930  Pt AOx4, SR/ Afib, RA, VSS  Heparin gtt infusing  Cardizem gtt started for Afib RVR  (+) cough  D/c Planning: pending clinical course  Call light within reach.  Needs currently met

## 2025-02-18 NOTE — PLAN OF CARE
Assumed care around 1130  Patient alert, oriented x 4  RA throughout shift  BP low in AM, once rhythm convrted to NSR, normotensive  Afib resolved without intervention  Denies pain or dizziness   Up x 1 assist  Voiding via urinal or in bathroom, tolerating walking well  (+) BM this shift  Tolerating clears well  Hep gtt infusing per order  IVF per order  Call light within reach     Plan monitor  Patient and family updated on plan of care

## 2025-02-18 NOTE — PROGRESS NOTES
Progress Note  Beba Mijares Patient Status:  Inpatient    1926 MRN [id number]   Location [facility] 7NE-A Attending [place]*   Hosp Day # 2 PCP CLARK HIGGINS     Subjective:  Hoarse voice    Objective:  /68 (BP Location: Right arm)   Pulse 80   Temp 100.1 °F (37.8 °C) (Oral)   Resp 26   Ht 5' 6\" (1.676 m)   Wt 140 lb (63.5 kg)   SpO2 94%   BMI 22.60 kg/m²     Telemetry: Sr, Frequent PAC's      Intake/Output: +3337    Intake/Output Summary (Last 24 hours) at 2025 1007  Last data filed at 2025 0625  Gross per 24 hour   Intake 2501.69 ml   Output --   Net 2501.69 ml       Last 3 Weights   25 0644 140 lb (63.5 kg)   25 0442 140 lb 3.2 oz (63.6 kg)   02/15/25 1850 100 lb (45.4 kg)   24 0221 142 lb 3.2 oz (64.5 kg)   24 2218 140 lb (63.5 kg)   24 1137 140 lb (63.5 kg)       Labs:  Recent Labs   Lab 02/15/25  2002 02/17/25  0427 02/17/25  0949   * 137* 111*   BUN 24* 24* 26*   CREATSERUM 0.87 0.78 0.77   EGFRCR 78 81 81   CA 9.1 9.0 8.8   ALB 4.2  --   --     143 144   K 4.4 3.9 3.9    108 109   CO2 23.0 25.0 24.0   ALKPHO 58  --   --    AST 26  --   --    ALT 14  --   --    BILT 1.1*  --   --    TP 7.4  --   --      Recent Labs   Lab 02/15/25  19025  0949 25  0617   RBC 5.35 4.53 4.42  --    HGB 16.2 13.2 13.2  --    HCT 48.1 41.1 40.4  --    MCV 89.9 90.7 91.4  --    MCH 30.3 29.1 29.9  --    MCHC 33.7 32.1 32.7  --    RDW 15.0 14.7 15.1  --    NEPRELIM 7.80* 8.43*  --   --    WBC 10.9 10.7 12.3*  --    .0 173.0 176.0 141.0*         No results for input(s): \"TROP\", \"TROPHS\", \"CK\" in the last 168 hours.  Lab Results   Component Value Date    INR 1.05 2016    INR 1.03 2016       Diagnostics:     Review of Systems   Constitutional: Positive for malaise/fatigue.   Cardiovascular:  Negative for dyspnea on exertion.   Respiratory:  Positive for cough.        Physical  Exam:    Gen: Alert, oriented x 3, in no apparent distress  Heent: Pupils equal, reactive. Mucous membranes moist.   Cardiac: Regular rate and rhythm, normal S1,S2  Lungs: Clear to auscultation  Abd: Soft, non tender, non distended  Ext: No edema  Skin: Warm, dry           Medications:     dilTIAZem  5 mg Intravenous Once    aspirin  81 mg Oral Daily    isosorbide mononitrate ER  30 mg Oral Daily    lisinopril  10 mg Oral BID    pantoprazole  40 mg Oral BID AC    rosuvastatin  10 mg Oral Daily    tamsulosin  0.4 mg Oral Nightly    mineral oil-white petrolatum   Both Eyes TID      dilTIAZem 5 mg/hr (02/18/25 0212)    continuous dose heparin      sodium chloride 83 mL/hr at 02/18/25 0625           Assessment:  Paroxysmal Atrial fibrillation with RVR, now SR HR 80's  On cardizem 5 mg /hr  norml LVEF per echo in June 2024  On IV heparin   Hypotension - resolved with IVF  Likely due to dehydration due to N/V  Food impaction s/p retrieval   Hyperlipidemia - on statin  Cad, hx of EDILIA  On ASA, nitrate, statin  Hx of bradycardia      Plan:  Wean off of cardizem infusion  Continue statin, ASA, Ace  Currently on IV heparin, will discuss DOAC    Plan of care discussed with patient, RN.    HOLLY Cuevas  2/18/2025  10:07 AM    Patient seen and examined    Patient looks well.  He had 1 episode of atrial fibrillation.  He is in sinus rhythm and looks well.  He is still not eating well.  He is on IV heparin and Cardizem.  We will discontinue his IV Cardizem.    Patient is also seen by Dr. Flower last year primarily for bradycardia.  At 89, he is at elevated risk for long-term anticoagulation.  We will consult our EP colleagues to see if antiarrhythmic would be an option for this gentleman.    Dusty Dennis MD [facility]  L3

## 2025-02-19 PROBLEM — K22.2 ESOPHAGEAL STENOSIS: Status: ACTIVE | Noted: 2025-02-17

## 2025-02-19 PROBLEM — K22.2 ESOPHAGEAL STRICTURE: Status: ACTIVE | Noted: 2025-02-17

## 2025-02-19 LAB
APTT PPP: 68.4 SECONDS (ref 23–36)
APTT PPP: 82.2 SECONDS (ref 23–36)
ATRIAL RATE: 90 BPM
PLATELET # BLD AUTO: 152 10(3)UL (ref 150–450)
Q-T INTERVAL: 354 MS
QRS DURATION: 98 MS
QTC CALCULATION (BEZET): 403 MS
R AXIS: 27 DEGREES
T AXIS: 114 DEGREES
VENTRICULAR RATE: 78 BPM

## 2025-02-19 PROCEDURE — 99232 SBSQ HOSP IP/OBS MODERATE 35: CPT | Performed by: HOSPITALIST

## 2025-02-19 NOTE — PLAN OF CARE
Received pt at 1930  Pt AOx4, SR/ Afib, RA, VSS  Heparin gtt infusing  Bursts of Afib RVR. Non sustaining  (+) cough. Declined Tessalon   D/c Planning: pending clinical course  Call light within reach.  Needs currently met      Problem: Patient/Family Goals  Goal: Patient/Family Long Term Goal  Description: Patient's Long Term Goal: able to tolerate diet and stay healthy    Interventions: IVF , antiemetics , monitor electrolytes   -   - See additional Care Plan goals for specific interventions  Outcome: Progressing  Goal: Patient/Family Short Term Goal  Description: Patient's Short Term Goal: stop vomiting     Interventions: IVF antiemetics   -   - See additional Care Plan goals for specific interventions  Outcome: Progressing     Problem: RESPIRATORY - ADULT  Goal: Achieves optimal ventilation and oxygenation  Description: INTERVENTIONS:  - Assess for changes in respiratory status  - Assess for changes in mentation and behavior  - Position to facilitate oxygenation and minimize respiratory effort  - Oxygen supplementation based on oxygen saturation or ABGs  - Provide Smoking Cessation handout, if applicable  - Encourage broncho-pulmonary hygiene including cough, deep breathe, Incentive Spirometry  - Assess the need for suctioning and perform as needed  - Assess and instruct to report SOB or any respiratory difficulty  - Respiratory Therapy support as indicated  - Manage/alleviate anxiety  - Monitor for signs/symptoms of CO2 retention  Outcome: Progressing     Problem: CARDIOVASCULAR - ADULT  Goal: Maintains optimal cardiac output and hemodynamic stability  Description: INTERVENTIONS:  - Monitor vital signs, rhythm, and trends  - Monitor for bleeding, hypotension and signs of decreased cardiac output  - Evaluate effectiveness of vasoactive medications to optimize hemodynamic stability  - Monitor arterial and/or venous puncture sites for bleeding and/or hematoma  - Assess quality of pulses, skin color and  temperature  - Assess for signs of decreased coronary artery perfusion - ex. Angina  - Evaluate fluid balance, assess for edema, trend weights  Outcome: Progressing  Goal: Absence of cardiac arrhythmias or at baseline  Description: INTERVENTIONS:  - Continuous cardiac monitoring, monitor vital signs, obtain 12 lead EKG if indicated  - Evaluate effectiveness of antiarrhythmic and heart rate control medications as ordered  - Initiate emergency measures for life threatening arrhythmias  - Monitor electrolytes and administer replacement therapy as ordered  Outcome: Progressing

## 2025-02-19 NOTE — PROGRESS NOTES
Progress Note  Beba Mijares Patient Status:  Inpatient    1926 MRN [id number]   [facility] 7NE-A Attending Monique Montesinos MD   Hosp Day # 3 PCP CLARK HIGGINS     Subjective:  Reports he feels \"fine\". No chest pain, palpitations, or dyspnea. Dry cough.    Objective:  Physical Exam:   /85 (BP Location: Left arm)   Pulse 115   Temp 98.8 °F (37.1 °C) (Oral)   Resp 24   Ht 5' 6\" (1.676 m)   Wt 140 lb (63.5 kg)   SpO2 96%   BMI 22.60 kg/m²   Temp (24hrs), Av.4 °F (36.9 °C), Min:97.7 °F (36.5 °C), Max:99 °F (37.2 °C)       Intake/Output Summary (Last 24 hours) at 2025  Last data filed at 2025 1400  Gross per 24 hour   Intake 2136.62 ml   Output --   Net 2136.62 ml     Wt Readings from Last 3 Encounters:   25 140 lb (63.5 kg)   24 142 lb 3.2 oz (64.5 kg)   24 140 lb (63.5 kg)     Telemetry: NSR currently  Episodes of afib RVR and wide complex tachycardia this afternoon  General: Alert and oriented in no apparent distress lying comfortably in bed flat on 2L with daughters bedside.  HEENT: No focal deficits.  Neck: No JVD, carotids 2+ no bruits.  Cardiac: Regular rate and rhythm, S1, S2 normal, rub or gallop.  Lungs: Clear without wheezes, rales, rhonchi or dullness.  Normal excursions and effort.  Abdomen: Soft, non-tender.   Extremities: Without clubbing, cyanosis or edema.  Peripheral pulses are 2+.  Neurologic: Alert and oriented, normal affect.  Skin: Warm and dry.        Intake/Output:    Intake/Output Summary (Last 24 hours) at 2025 1625  Last data filed at 2025 1400  Gross per 24 hour   Intake 2136.62 ml   Output --   Net 2136.62 ml       Last 3 Weights   25 0644 140 lb (63.5 kg)   25 0442 140 lb 3.2 oz (63.6 kg)   02/15/25 1850 100 lb (45.4 kg)   24 0221 142 lb 3.2 oz (64.5 kg)   24 2218 140 lb (63.5 kg)   24 1137 140 lb (63.5 kg)       Labs:  Recent Labs   Lab 02/15/25  2002 02/17/25  0426  02/17/25  0949   * 137* 111*   BUN 24* 24* 26*   CREATSERUM 0.87 0.78 0.77   EGFRCR 78 81 81   CA 9.1 9.0 8.8    143 144   K 4.4 3.9 3.9    108 109   CO2 23.0 25.0 24.0     Recent Labs   Lab 02/15/25  1909 02/17/25  0426 02/17/25  0949 02/18/25  0617 02/19/25  0518   RBC 5.35 4.53 4.42  --   --    HGB 16.2 13.2 13.2  --   --    HCT 48.1 41.1 40.4  --   --    MCV 89.9 90.7 91.4  --   --    MCH 30.3 29.1 29.9  --   --    MCHC 33.7 32.1 32.7  --   --    RDW 15.0 14.7 15.1  --   --    NEPRELIM 7.80* 8.43*  --   --   --    WBC 10.9 10.7 12.3*  --   --    .0 173.0 176.0 141.0* 152.0         No results for input(s): \"TROP\", \"TROPHS\", \"CK\" in the last 168 hours.    Diagnostics:   ECHOCARDIOGRAM 6/27/2024:  1. Left ventricle: The cavity size was normal. Wall thickness was normal.      Systolic function was normal. The estimated ejection fraction was 65-70%.      Wall motion is normal; there are no regional wall motion abnormalities.      Doppler parameters are consistent with abnormal left ventricular      relaxation - grade 1 diastolic dysfunction.   2. Right ventricle: The cavity size was normal. Systolic function was      normal.   3. Left atrium: The atrium was dilated. The left atrial volume was mildly      increased.   4. Aortic valve: The valve was trileaflet. The leaflets were mildly to      moderatley thickened and calcified. No significant stenosis but mildly      reduced cusp separation. There was mild regurgitation. The peak systolic      velocity was 1.81m/sec. The mean systolic gradient was 7mm Hg. The valve      area (VTI) was 2.09cm^2. The valve area (VTI) index was 1.24cm^2/m^2.   5. Aortic root: The aortic root was 3.8cm diameter.   6. Ascending aorta: The ascending aorta was 4.3cm diameter.   7. Pericardium, extracardiac: There was no pericardial effusion.     Medications:   aspirin  81 mg Oral Daily    isosorbide mononitrate ER  30 mg Oral Daily    lisinopril  10 mg Oral BID     pantoprazole  40 mg Oral BID AC    rosuvastatin  10 mg Oral Daily    tamsulosin  0.4 mg Oral Nightly    mineral oil-white petrolatum   Both Eyes TID      amiodarone 1 mg/min (02/19/25 1408)    Followed by    amiodarone      continuous dose heparin 700 Units/hr (02/19/25 0628)    sodium chloride 83 mL/hr at 02/18/25 0625       Assessment/Plan:    Admitted with esophageal impaction having had food removed by GI on 2/16  Paroxsymal afib with RVR  Hypotension limiting AV reagan options  Episodes of NSR and bursts of afib RVR and wide complex tachycardia -Begin IV amiodarone  On IV heparin  LVEF historically preserved  Will review long term AC with attending cardiologist he is a poor candidate.  Improved with IVF suspect dehydration and Covid +   Limits AV reagan options for fib  Supportive care  HLD on statin  CAD s/p EDILIA  ASA, nitrate, statin, ACE  Hx of bradycardia      PLAN  Continue IV amiodarone given soft blood pressures and afib RVR  Continue IV heparin - ongoing discussion of long term AC risk VS benefit  Continue ASA, nitrate, statin, ACE - could hold Imdur and ACE to allow for increased BP for AV reagan agents  Supportive care for COVID  Will review with attending physician        Jordan Hernandez PA-C  2/19/2025  4:25 PM     Patient seen and examined    Fibrillation.  IV amiodarone just given.  He is continue with IV heparin.  He otherwise looks compensated.  He is not positive for COVID-19.  Will continue with supportive care.      Dusty Dennis MD [facility]  L3

## 2025-02-19 NOTE — PROGRESS NOTES
[facility]   part of [facility]     Hospitalist Progress Note     Beba Mijares Patient Status:  Inpatient    1926 MRN [id number]   Location [facility] 7NE-A Attending [place]*   Hosp Day # 3 PCP CLARK HIGGINS     Chief Complaint: Vomiting     Subjective:     Tolerating diet, no nausea, chest pain or vomiting    Objective:    Review of Systems:   A comprehensive review of systems was completed; pertinent positive and negatives stated in subjective.    Vital signs:  Temp:  [97.7 °F (36.5 °C)-99 °F (37.2 °C)] 98.8 °F (37.1 °C)  Pulse:  [] 115  Resp:  [20-28] 24  BP: ()/(63-85) 102/85  SpO2:  [92 %-96 %] 96 %    Physical Exam:    General: No acute distress  Respiratory: No wheezes, no rhonchi  Cardiovascular: S1, S2, regular rate and rhythm  Abdomen: Soft, Non-tender, non-distended, positive bowel sounds  Neuro: No new focal deficits.   Extremities: No edema      Diagnostic Data:    Labs:  Recent Labs   Lab 02/15/25  1909 02/17/25  0426 02/17/25  0949 25  0617 25  0518   WBC 10.9 10.7 12.3*  --   --    HGB 16.2 13.2 13.2  --   --    MCV 89.9 90.7 91.4  --   --    .0 173.0 176.0 141.0* 152.0       Recent Labs   Lab 02/15/25  2002 02/17/25  0427 25  0949   * 137* 111*   BUN 24* 24* 26*   CREATSERUM 0.87 0.78 0.77   CA 9.1 9.0 8.8   ALB 4.2  --   --     143 144   K 4.4 3.9 3.9    108 109   CO2 23.0 25.0 24.0   ALKPHO 58  --   --    AST 26  --   --    ALT 14  --   --    BILT 1.1*  --   --    TP 7.4  --   --        Estimated Glomerular Filtration Rate: 80.9 mL/min/1.73m2 (by CKD-EPI based on SCr of 0.77 mg/dL).    No results for input(s): \"TROP\", \"TROPHS\", \"CK\" in the last 168 hours.    No results for input(s): \"PTP\", \"INR\" in the last 168 hours.       Microbiology    No results found for this visit on 02/15/25.      Imaging: Reviewed in Epic.    Medications:    amiodarone  150 mg Intravenous Once    aspirin  81 mg Oral Daily     isosorbide mononitrate ER  30 mg Oral Daily    lisinopril  10 mg Oral BID    pantoprazole  40 mg Oral BID AC    rosuvastatin  10 mg Oral Daily    tamsulosin  0.4 mg Oral Nightly    mineral oil-white petrolatum   Both Eyes TID       Assessment & Plan:      # Intractable N/V  -CT abd reviewed, no e/o obstruction   -EKG NSR, no acute st changes  -CXR clear, no consolidation   -IVF's, pureed diet   -Pt had food impaction removal on Sunday.      # New onset atrial fibrillation with RVR  - Transfer to CTU  - cardiology consulted  -amiodarone  -EP to see    #CAD  -ASA, statin, ace, imdur     #Essential HTN  -Lisinopril      #GERD  -PPI     #HLD  -Statin      Monique Montesinos M.D.  [place] Hospitalist     Supplementary Documentation:     Quality:  DVT Mechanical Prophylaxis:     Early ambuation  DVT Pharmacologic Prophylaxis   Medication    heparin (Porcine) 91411 units/250mL infusion ACS/AFIB CONTINUOUS      DVT Pharmacologic prophylaxis: Aspirin 162 mg         Code Status: Prior  Jennings: No urinary catheter in place  Jennings Duration (in days):   Central line:    ELIN:     Discharge is dependent on: clinical improvement  At this point Mr. Mijares is expected to be discharge to: TBD    The 21st Century Cures Act makes medical notes like these available to patients in the interest of transparency. Please be advised this is a medical document. Medical documents are intended to carry relevant information, facts as evident, and the clinical opinion of the practitioner. The medical note is intended as peer to peer communication and may appear blunt or direct. It is written in medical language and may contain abbreviations or verbiage that are unfamiliar.

## 2025-02-19 NOTE — PAYOR COMM NOTE
--------------  CONTINUED STAY REVIEW    Payor: [employer]  Subscriber #:  [id number]  Authorization Number: [id number]    Admit date: 2/16/25  Admit time:  1:15 AM    2/18/25         Temp:  [97.6 °F (36.4 °C)-100.1 °F (37.8 °C)] 100.1 °F (37.8 °C)  Pulse:  [] 80  Resp:  [16-26] 26  BP: ()/(56-85) 129/68  SpO2:  [93 %-96 %] 94 %     Respiratory: No wheezes, no rhonchi  Cardiovascular: S1, S2, regular rate and rhythm  Abdomen: Soft, Non-tender, non-distended, positive bowel sounds  Neuro: No new focal deficits.   Extremities: No edema     Lab 02/15/25  1909 02/17/25  0426 02/17/25  0949 02/18/25  0617   WBC 10.9 10.7 12.3*  --    HGB 16.2 13.2 13.2  --    MCV 89.9 90.7 91.4  --    .0 173.0 176.0 141.0*    ]  Lab 02/15/25  2002 02/17/25  0427 02/17/25  0949   * 137* 111*   BUN 24* 24* 26*   CREATSERUM 0.87 0.78 0.77   CA 9.1 9.0 8.8   ALB 4.2  --   --     143 144   K 4.4 3.9 3.9    108 109   CO2 23.0 25.0 24.0   ALKPHO 58  --   --    AST 26  --   --    ALT 14  --   --    BILT 1.1*  --   --    TP 7.4  --   --       Medications:    dilTIAZem  5 mg Intravenous Once    aspirin  81 mg Oral Daily    isosorbide mononitrate ER  30 mg Oral Daily    lisinopril  10 mg Oral BID    pantoprazole  40 mg Oral BID AC    rosuvastatin  10 mg Oral Daily    tamsulosin  0.4 mg Oral Nightly    mineral oil-white petrolatum   Both Eyes TID       Assessment & Plan:       # Intractable N/V  CT abd reviewed, no e/o obstruction   EKG NSR, no acute st changes  CXR clear, no consolidation   IVF's, CLD, ADAT  Pt had food impaction removal on Sunday.        # New onset atrial fibrillation with RVR  - Transfer to CTU  - cardiology consulted  - ordered cardizem, but if BP does not improve patient may need amiodarone instead.    #CAD  ASA, statin, ace, imdur     #Essential HTN  Lisinopril      #GERD  PPI     #HLD  Statin                     CARDIOLOGY  Assessment:  Paroxysmal Atrial fibrillation with  RVR, now SR HR 80's  On cardizem 5 mg /hr  norml LVEF per echo in June 2024  On IV heparin   Hypotension - resolved with IVF  Likely due to dehydration due to N/V  Food impaction s/p retrieval   Hyperlipidemia - on statin  Cad, hx of EDILIA  On ASA, nitrate, statin  Hx of bradycardia        Plan:  Wean off of cardizem infusion  Continue statin, ASA, Ace  Currently on IV heparin, will discuss DOAC      2/19/25       Temp:  [97.7 °F (36.5 °C)-99 °F (37.2 °C)] 98.8 °F (37.1 °C)  Pulse:  [] 115  Resp:  [20-28] 24  BP: ()/(63-85) 102/85  SpO2:  [92 %-96 %] 96 %     Respiratory: No wheezes, no rhonchi  Cardiovascular: S1, S2, regular rate and rhythm  Abdomen: Soft, Non-tender, non-distended, positive bowel sounds  Neuro: No new focal deficits.   Extremities: No edema     Lab 02/15/25  1909 02/17/25  0426 02/17/25  0949 02/18/25  0617 02/19/25  0518   WBC 10.9 10.7 12.3*  --   --    HGB 16.2 13.2 13.2  --   --    MCV 89.9 90.7 91.4  --   --    .0 173.0 176.0 141.0* 152.0      Lab 02/15/25  2002 02/17/25  0427 02/17/25  0949   * 137* 111*   BUN 24* 24* 26*   CREATSERUM 0.87 0.78 0.77   CA 9.1 9.0 8.8   ALB 4.2  --   --     143 144   K 4.4 3.9 3.9    108 109   CO2 23.0 25.0 24.0   ALKPHO 58  --   --    AST 26  --   --    ALT 14  --   --    BILT 1.1*  --   --    TP 7.4  --   --        Medications:    amiodarone  150 mg Intravenous Once    aspirin  81 mg Oral Daily    isosorbide mononitrate ER  30 mg Oral Daily    lisinopril  10 mg Oral BID    pantoprazole  40 mg Oral BID AC    rosuvastatin  10 mg Oral Daily    tamsulosin  0.4 mg Oral Nightly    mineral oil-white petrolatum   Both Eyes TID       Assessment & Plan:       # Intractable N/V  -CT abd reviewed, no e/o obstruction   -EKG NSR, no acute st changes  -CXR clear, no consolidation   -IVF's, pureed diet   -Pt had food impaction removal on Sunday.      # New onset atrial fibrillation with RVR  - Transfer to CTU  - cardiology  consulted  -amiodarone  -EP to see    #CAD  -ASA, statin, ace, imdur     #Essential HTN  -Lisinopril      #GERD  -PPI     #HLD  -Statin                 MEDICATIONS ADMINISTERED IN LAST 1 DAY:  amiodarone in dextrose 4.14% (Cordarone) 360 mg/200mL infusion premix       Date Action Dose Route User    2/19/2025 1408 New Bag 1 mg/min Intravenous Tracee Emerson RN          amiodarone (Cordarone) 150 mg in dextrose 5% 100 mL IV bolus       Date Action Dose Route User    2/19/2025 1346 New Bag 150 mg Intravenous Tracee Emerson RN          mineral oil-white petrolatum (Artificial Tears) 83-15 % ophthalmic ointment       Date Action Dose Route User    2/19/2025 0918 Given (none) Both Eyes Tracee Emerson RN    2/18/2025 1717 Given (none) Both Eyes Shira Benz RN          aspirin chewable tab 81 mg       Date Action Dose Route User    2/19/2025 0915 Given 81 mg Oral Tracee Emerson RN          heparin (Porcine) 78011 units/250mL infusion ACS/AFIB CONTINUOUS       Date Action Dose Route User    2/19/2025 0628 Hi-Risk Rate/Dose Change 700 Units/hr Intravenous Edward Jordan RN          lisinopril (Zestril) tab 10 mg       Date Action Dose Route User    2/19/2025 0915 Given 10 mg Oral Tracee Emerson RN    2/18/2025 2019 Given 10 mg Oral Elba Knowles RN          pantoprazole (Protonix) DR tab 40 mg       Date Action Dose Route User    2/19/2025 0524 Given 40 mg Oral Elba Knowles RN    2/18/2025 1717 Given 40 mg Oral Shira Benz RN          rosuvastatin (Crestor) tab 10 mg       Date Action Dose Route User    2/19/2025 0915 Given 10 mg Oral Tracee Emerson RN          tamsulosin (Flomax) cap 0.4 mg       Date Action Dose Route User    2/18/2025 2019 Given 0.4 mg Oral Elba Knowles RN            Vitals (last day)       Date/Time Temp Pulse Resp BP SpO2 Weight O2 Device O2 Flow Rate (L/min) [facility]    02/19/25 1200 98.8 °F (37.1 °C) 115 24 102/85 96 % -- Nasal cannula 2 L/min     02/19/25  0800 99 °F (37.2 °C) 89 22 96/63 96 % -- Nasal cannula 2 L/min LM    02/19/25 0400 97.7 °F (36.5 °C) 75 20 104/75 96 % -- -- -- VO    02/19/25 0000 98.5 °F (36.9 °C) 76 26 94/81 96 % -- None (Room air) -- VO    02/18/25 2000 97.8 °F (36.6 °C) 93 28 117/78 93 % -- None (Room air) -- VO    02/18/25 1600 98 °F (36.7 °C) 97 22 117/70 92 % -- None (Room air) -- LM    02/18/25 1130 99.9 °F (37.7 °C) 86 25 106/74 91 % -- None (Room air) -- LM    02/18/25 0800 100.1 °F (37.8 °C) 80 26 129/68 94 % -- None (Room air) -- LM    02/18/25 0500 98.1 °F (36.7 °C) 78 23 125/81 93 % -- None (Room air) -- VO    02/18/25 0034 -- 133 22 124/83 93 % -- -- -- NA    02/18/25 0000 98.8 °F (37.1 °C) 89 16 110/64 93 % -- None (Room air) -- VO

## 2025-02-20 LAB
ANION GAP SERPL CALC-SCNC: 8 MMOL/L (ref 0–18)
APTT PPP: 33.5 SECONDS (ref 23–36)
APTT PPP: 73.2 SECONDS (ref 23–36)
BUN BLD-MCNC: 20 MG/DL (ref 9–23)
CALCIUM BLD-MCNC: 8.4 MG/DL (ref 8.7–10.6)
CHLORIDE SERPL-SCNC: 105 MMOL/L (ref 98–112)
CO2 SERPL-SCNC: 24 MMOL/L (ref 21–32)
CREAT BLD-MCNC: 0.68 MG/DL
EGFRCR SERPLBLD CKD-EPI 2021: 84 ML/MIN/1.73M2 (ref 60–?)
ERYTHROCYTE [DISTWIDTH] IN BLOOD BY AUTOMATED COUNT: 14.8 %
GLUCOSE BLD-MCNC: 179 MG/DL (ref 70–99)
HCT VFR BLD AUTO: 38.8 %
HGB BLD-MCNC: 13.1 G/DL
MCH RBC QN AUTO: 29.6 PG (ref 26–34)
MCHC RBC AUTO-ENTMCNC: 33.8 G/DL (ref 31–37)
MCV RBC AUTO: 87.8 FL
OSMOLALITY SERPL CALC.SUM OF ELEC: 291 MOSM/KG (ref 275–295)
PLATELET # BLD AUTO: 152 10(3)UL (ref 150–450)
PLATELET # BLD AUTO: 179 10(3)UL (ref 150–450)
POTASSIUM SERPL-SCNC: 3.2 MMOL/L (ref 3.5–5.1)
RBC # BLD AUTO: 4.42 X10(6)UL
SODIUM SERPL-SCNC: 137 MMOL/L (ref 136–145)
WBC # BLD AUTO: 6.5 X10(3) UL (ref 4–11)

## 2025-02-20 PROCEDURE — 99232 SBSQ HOSP IP/OBS MODERATE 35: CPT | Performed by: HOSPITALIST

## 2025-02-20 RX ORDER — AMIODARONE HYDROCHLORIDE 200 MG/1
200 TABLET ORAL 2 TIMES DAILY WITH MEALS
Status: DISCONTINUED | OUTPATIENT
Start: 2025-02-20 | End: 2025-02-22

## 2025-02-20 RX ORDER — AMIODARONE HYDROCHLORIDE 200 MG/1
400 TABLET ORAL 2 TIMES DAILY WITH MEALS
Status: DISCONTINUED | OUTPATIENT
Start: 2025-02-20 | End: 2025-02-20

## 2025-02-20 RX ORDER — POTASSIUM CHLORIDE 1.5 G/1.58G
40 POWDER, FOR SOLUTION ORAL ONCE
Status: COMPLETED | OUTPATIENT
Start: 2025-02-20 | End: 2025-02-20

## 2025-02-20 RX ORDER — AMIODARONE HYDROCHLORIDE 200 MG/1
TABLET ORAL
Qty: 44 TABLET | Refills: 1 | Status: SHIPPED | OUTPATIENT
Start: 2025-02-20 | End: 2025-03-29

## 2025-02-20 RX ORDER — HEPARIN SODIUM 1000 [USP'U]/ML
30 INJECTION, SOLUTION INTRAVENOUS; SUBCUTANEOUS ONCE
Status: COMPLETED | OUTPATIENT
Start: 2025-02-20 | End: 2025-02-20

## 2025-02-20 NOTE — PROGRESS NOTES
Progress Note  Beba Mijares Patient Status:  Inpatient    1926 MRN [id number]   Location [facility] 7NE-A Attending Monique Montesinos MD   Hosp Day # 4 PCP CLARK HIGGINS     Subjective:  Patient denies complaints. HR mostly controlled in the 80's    Objective:  /82 (BP Location: Left arm)   Pulse (!) 127   Temp 97.8 °F (36.6 °C) (Oral)   Resp 22   Ht 5' 6\" (1.676 m)   Wt 140 lb (63.5 kg)   SpO2 93%   BMI 22.60 kg/m²     Telemetry: afib, HR       Intake/Output: +5659    Intake/Output Summary (Last 24 hours) at 2025 0953  Last data filed at 2025 0830  Gross per 24 hour   Intake 360 ml   Output 175 ml   Net 185 ml       Last 3 Weights   25 0644 140 lb (63.5 kg)   25 0442 140 lb 3.2 oz (63.6 kg)   02/15/25 1850 100 lb (45.4 kg)   24 0221 142 lb 3.2 oz (64.5 kg)   24 2218 140 lb (63.5 kg)   24 1137 140 lb (63.5 kg)       Labs:  Recent Labs   Lab 02/15/25  2002 02/17/25  0427 02/17/25  0949   * 137* 111*   BUN 24* 24* 26*   CREATSERUM 0.87 0.78 0.77   EGFRCR 78 81 81   CA 9.1 9.0 8.8   ALB 4.2  --   --     143 144   K 4.4 3.9 3.9    108 109   CO2 23.0 25.0 24.0   ALKPHO 58  --   --    AST 26  --   --    ALT 14  --   --    BILT 1.1*  --   --    TP 7.4  --   --      Recent Labs   Lab 02/15/25  19025  0949 25  0617 25  0518 25  0545   RBC 5.35 4.53 4.42  --   --   --    HGB 16.2 13.2 13.2  --   --   --    HCT 48.1 41.1 40.4  --   --   --    MCV 89.9 90.7 91.4  --   --   --    MCH 30.3 29.1 29.9  --   --   --    MCHC 33.7 32.1 32.7  --   --   --    RDW 15.0 14.7 15.1  --   --   --    NEPRELIM 7.80* 8.43*  --   --   --   --    WBC 10.9 10.7 12.3*  --   --   --    .0 173.0 176.0 141.0* 152.0 152.0         No results for input(s): \"TROP\", \"TROPHS\", \"CK\" in the last 168 hours.  Lab Results   Component Value Date    INR 1.05 2016    INR 1.03 2016       Diagnostics:     Review of  Systems   Constitutional: Positive for malaise/fatigue.   Cardiovascular:  Positive for irregular heartbeat.   Respiratory:  Positive for cough.        Physical Exam:    Gen: Alert, oriented x 3, in no apparent distress  Heent: Pupils equal, reactive. Mucous membranes moist. k: No JVD  Cardiac: irregular rate and rhythm, normal S1,S2  Lungs: Clear to auscultation  Abd: Soft, non tender, non distended  Ext: No edema  Skin: Warm, dry        Medications:     aspirin  81 mg Oral Daily    isosorbide mononitrate ER  30 mg Oral Daily    lisinopril  10 mg Oral BID    pantoprazole  40 mg Oral BID AC    rosuvastatin  10 mg Oral Daily    tamsulosin  0.4 mg Oral Nightly    mineral oil-white petrolatum   Both Eyes TID      amiodarone 0.5 mg/min (02/19/25 2304)    continuous dose heparin 800 Units/hr (02/20/25 0630)    sodium chloride 83 mL/hr at 02/18/25 0625           Assessment:  Paroxysmal Atrial fibrillation with RVR, now SR HR 80's  On IV amiodarone  norml LVEF per echo in June 2024  On IV heparin for now.   Hx of bradycardia, not on AV reagan blocking agents  Hypotension - resolved with IVF  Likely due to dehydration due to N/V  Food impaction s/p retrieval   Hyperlipidemia - on statin  Cad, hx of EDILIA  On ASA, nitrate, statin  Covid postive    Plan:  Discontinue IV amiodarone and change to oral 400 mg po BID  Continue ASA, nitrate, statin, ACE  Continue IV heparin for now.     Plan of care discussed with patient, RN.    HOLLY Cuevas  2/20/2025  9:53 AM    Addendum: D/W Dr. Dennis. Change amiodarone 200 mg po BID for one week, then 200 mg po daily.   3:19 PM     Chart reveiwed and decision making performed in entirety with discussion with staff. Agree with above note and assessment with the following additions made below.     General: No acute distress.   HEENT- NCAT,   CVS- normal S1, S2  Lungs- clear bilaterally.   Abdomen- soft, non-tender.  Extremities- Equal pulses, no edema.   Would suggest DOAC, eliquis  2.5 mg BID  Continue current medications    Luma Hazel MD  [facility]  Cardiac Electrophysiolgy

## 2025-02-20 NOTE — PLAN OF CARE
Assumed pt care at 0730  Pt resting in bed in no apparent distress.   Denies any pain or discomfort.   Tolerated pureed diet   Pt denies n/v, Abd soft non distended.    (+)BS. Heparin gtt infusing as ordered.   VSS. Afebrile. NSR on tele. O2 sats >90% on RA  All safety precautions in place and all needs met at this time      Problem: Patient/Family Goals  Goal: Patient/Family Long Term Goal  Description: Patient's Long Term Goal: able to tolerate diet and stay healthy    Interventions: IVF , antiemetics , monitor electrolytes   -   - See additional Care Plan goals for specific interventions  Outcome: Progressing  Goal: Patient/Family Short Term Goal  Description: Patient's Short Term Goal: stop vomiting     Interventions: IVF antiemetics   -   - See additional Care Plan goals for specific interventions  Outcome: Progressing     Problem: RESPIRATORY - ADULT  Goal: Achieves optimal ventilation and oxygenation  Description: INTERVENTIONS:  - Assess for changes in respiratory status  - Assess for changes in mentation and behavior  - Position to facilitate oxygenation and minimize respiratory effort  - Oxygen supplementation based on oxygen saturation or ABGs  - Provide Smoking Cessation handout, if applicable  - Encourage broncho-pulmonary hygiene including cough, deep breathe, Incentive Spirometry  - Assess the need for suctioning and perform as needed  - Assess and instruct to report SOB or any respiratory difficulty  - Respiratory Therapy support as indicated  - Manage/alleviate anxiety  - Monitor for signs/symptoms of CO2 retention  Outcome: Progressing     Problem: CARDIOVASCULAR - ADULT  Goal: Maintains optimal cardiac output and hemodynamic stability  Description: INTERVENTIONS:  - Monitor vital signs, rhythm, and trends  - Monitor for bleeding, hypotension and signs of decreased cardiac output  - Evaluate effectiveness of vasoactive medications to optimize hemodynamic stability  - Monitor arterial and/or venous  puncture sites for bleeding and/or hematoma  - Assess quality of pulses, skin color and temperature  - Assess for signs of decreased coronary artery perfusion - ex. Angina  - Evaluate fluid balance, assess for edema, trend weights  Outcome: Progressing  Goal: Absence of cardiac arrhythmias or at baseline  Description: INTERVENTIONS:  - Continuous cardiac monitoring, monitor vital signs, obtain 12 lead EKG if indicated  - Evaluate effectiveness of antiarrhythmic and heart rate control medications as ordered  - Initiate emergency measures for life threatening arrhythmias  - Monitor electrolytes and administer replacement therapy as ordered  Outcome: Progressing

## 2025-02-20 NOTE — PLAN OF CARE
Assumed care at 1930  Alert and oriented x3   NSR/A fib on tele and on RA  Denies any pain/discomfort   Meds whole w/ applesauce  Hep gtt infusing & titrated per protocol  Amio gtt infusing per order  Up SBA  Isolation precautions in place & maintained   Call light w/in reach

## 2025-02-20 NOTE — PROGRESS NOTES
[facility]   part of [facility]     Hospitalist Progress Note     Beba Mijares Patient Status:  Inpatient    1926 MRN [id number]   Location [facility] 7NE-A Attending [place]*   Hosp Day # 4 PCP CLARK HIGGINS     Chief Complaint: Vomiting     Subjective:     Feels well, no complaints, denies chest pain, palpitations    Objective:    Review of Systems:   A comprehensive review of systems was completed; pertinent positive and negatives stated in subjective.    Vital signs:  Temp:  [97.5 °F (36.4 °C)-98.9 °F (37.2 °C)] 97.8 °F (36.6 °C)  Pulse:  [] 127  Resp:  [13-26] 22  BP: ()/(64-86) 125/82  SpO2:  [92 %-96 %] 93 %    Physical Exam:    General: No acute distress  Respiratory: No wheezes, no rhonchi  Cardiovascular: S1, S2, regular rate and rhythm  Abdomen: Soft, Non-tender, non-distended, positive bowel sounds  Neuro: No new focal deficits.   Extremities: No edema      Diagnostic Data:    Labs:  Recent Labs   Lab 02/15/25  1909 02/17/25  0426 02/17/25  0949 25  0617 25  0518 25  0545 25  0938   WBC 10.9 10.7 12.3*  --   --   --  6.5   HGB 16.2 13.2 13.2  --   --   --  13.1   MCV 89.9 90.7 91.4  --   --   --  87.8   .0 173.0 176.0 141.0* 152.0 152.0 179.0       Recent Labs   Lab 02/15/25  2002 02/17/25  04225  0949 25  0938   * 137* 111* 179*   BUN 24* 24* 26* 20   CREATSERUM 0.87 0.78 0.77 0.68*   CA 9.1 9.0 8.8 8.4*   ALB 4.2  --   --   --     143 144 137   K 4.4 3.9 3.9 3.2*    108 109 105   CO2 23.0 25.0 24.0 24.0   ALKPHO 58  --   --   --    AST 26  --   --   --    ALT 14  --   --   --    BILT 1.1*  --   --   --    TP 7.4  --   --   --        Estimated Glomerular Filtration Rate: 84 mL/min/1.73m2 (A) (by CKD-EPI based on SCr of 0.68 mg/dL (L)).    No results for input(s): \"TROP\", \"TROPHS\", \"CK\" in the last 168 hours.    No results for input(s): \"PTP\", \"INR\" in the last 168 hours.        Microbiology    No results found for this visit on 02/15/25.      Imaging: Reviewed in Epic.    Medications:    amiodarone  400 mg Oral BID with meals    potassium chloride  40 mEq Oral Once    aspirin  81 mg Oral Daily    isosorbide mononitrate ER  30 mg Oral Daily    lisinopril  10 mg Oral BID    pantoprazole  40 mg Oral BID AC    rosuvastatin  10 mg Oral Daily    tamsulosin  0.4 mg Oral Nightly    mineral oil-white petrolatum   Both Eyes TID       Assessment & Plan:      # Intractable N/V  -CT abd reviewed, no e/o obstruction   -EKG NSR, no acute st changes  -CXR clear, no consolidation   -IVF's, pureed diet   -Pt had food impaction removal on Sunday.    # New onset atrial fibrillation with RVR  - cardiology consulted  -amiodarone converted to oral today  -heparin gt- DOAC per Cards    #CAD  -ASA, statin, ace, imdur     #Essential HTN  -Lisinopril      #GERD  -PPI     #HLD  -Statin    #COVID positive  -asymptomatic,  exposure to wife who is positive    Dc planning    Monique Montesinos M.D.  [facility]     Supplementary Documentation:     Quality:  DVT Mechanical Prophylaxis:     Early ambuation  DVT Pharmacologic Prophylaxis   Medication    heparin (Porcine) 97402 units/250mL infusion ACS/AFIB CONTINUOUS      DVT Pharmacologic prophylaxis: Aspirin 162 mg         Code Status: Prior  Jennings: No urinary catheter in place  Jennings Duration (in days):   Central line:    ELIN:     Discharge is dependent on: clinical improvement  At this point Mr. Mijares is expected to be discharge to: TBD    The 21st Century Cures Act makes medical notes like these available to patients in the interest of transparency. Please be advised this is a medical document. Medical documents are intended to carry relevant information, facts as evident, and the clinical opinion of the practitioner. The medical note is intended as peer to peer communication and may appear blunt or direct. It is written in medical language and may contain  abbreviations or verbiage that are unfamiliar.

## 2025-02-20 NOTE — PROGRESS NOTES
Assumed pt care at 0730  Pt resting in bed in no apparent distress.   Denies any pain or discomfort.   Tolerated pureed diet and taking meds whole PO  Pt denies n/v, Abd soft non distended.   (+)BS.IVF infusing as ordered.   Pt cont X2, ambulating to bathroom  VSS. Afebrile. Pt has bursts of Afib with RVR pt placed on amiodarone gtt per MD. O2 sats >90% on RA with some SOB with exertion  All safety precautions in place and all needs met at this time

## 2025-02-21 LAB
ANION GAP SERPL CALC-SCNC: 8 MMOL/L (ref 0–18)
APTT PPP: 72.1 SECONDS (ref 23–36)
BUN BLD-MCNC: 17 MG/DL (ref 9–23)
CALCIUM BLD-MCNC: 8.7 MG/DL (ref 8.7–10.6)
CHLORIDE SERPL-SCNC: 107 MMOL/L (ref 98–112)
CO2 SERPL-SCNC: 25 MMOL/L (ref 21–32)
CREAT BLD-MCNC: 0.67 MG/DL
EGFRCR SERPLBLD CKD-EPI 2021: 84 ML/MIN/1.73M2 (ref 60–?)
GLUCOSE BLD-MCNC: 126 MG/DL (ref 70–99)
OSMOLALITY SERPL CALC.SUM OF ELEC: 293 MOSM/KG (ref 275–295)
POTASSIUM SERPL-SCNC: 3.9 MMOL/L (ref 3.5–5.1)
POTASSIUM SERPL-SCNC: 3.9 MMOL/L (ref 3.5–5.1)
SODIUM SERPL-SCNC: 140 MMOL/L (ref 136–145)

## 2025-02-21 PROCEDURE — 99232 SBSQ HOSP IP/OBS MODERATE 35: CPT | Performed by: HOSPITALIST

## 2025-02-21 RX ORDER — FUROSEMIDE 10 MG/ML
20 INJECTION INTRAMUSCULAR; INTRAVENOUS ONCE
Status: COMPLETED | OUTPATIENT
Start: 2025-02-21 | End: 2025-02-21

## 2025-02-21 RX ORDER — ALBUTEROL SULFATE 90 UG/1
2 INHALANT RESPIRATORY (INHALATION) 4 TIMES DAILY
Status: DISCONTINUED | OUTPATIENT
Start: 2025-02-21 | End: 2025-02-22

## 2025-02-21 NOTE — PLAN OF CARE
Assumed care at 0730  Alert and oriented x3  RA during day. Flipping between NSR and afib on tele. VSS. Albuterol ordered for wheezing  Denies any pain  Hard of hearing  Heparin gtt infusing  Up to bathroom SBA  IV lasix x1  Eliquis started. Will need 30 day free coupon at dc  Pt updated on POC. Daughters called and updated  Family at bedside  Safety precautions in place

## 2025-02-21 NOTE — PLAN OF CARE
Assumed care at 1930  Alert and oriented x3   NSR/A fib on tele and on RA  Denies any pain/discomfort   Meds whole w/ applesauce  Hep gtt infusing & titrated per protocol  Up SBA  Isolation precautions in place & maintained   Call light w/in reach

## 2025-02-21 NOTE — DIETARY NOTE
[facility]   part of [facility]   CLINICAL NUTRITION    Beba Mijares     Admitting diagnosis:  Dehydration [E86.0]  Nausea and vomiting, unspecified vomiting type [R11.2]    Ht: 167.6 cm (5' 6\")  Wt: 63.5 kg (140 lb).   Body mass index is 22.6 kg/m².  IBW: 59.1 kg    Wt Readings from Last 6 Encounters:   02/16/25 63.5 kg (140 lb)   06/04/24 64.5 kg (142 lb 3.2 oz)   03/29/24 63.5 kg (140 lb)   09/22/16 69.5 kg (153 lb 3.5 oz)   07/06/16 68 kg (150 lb)   10/28/13 66.2 kg (146 lb)        Labs/Meds reviewed    Diet:       Procedures    Regular/General diet Texture Consistency: Pureed; Is Patient on Accuchecks? No     Percent Meals Eaten (last 3 days)       Date/Time Percent Meals Eaten (%)    02/19/25 1400 50 %    02/20/25 0830 90 %     Percent Meals Eaten (%): Pureed eggs, waffle at 02/20/25 0830    02/21/25 0900 30 %     Percent Meals Eaten (%): eggs , yogurt at 02/21/25 0900            Pt chart reviewed d/t LOS.  Patient reports fair appetite at this time.  Nursing notes reports Percent Meals Eaten (%): 30 % (eggs , yogurt) intake for last meal.  Tolerating po diet without diarrhea, emesis, or constipation.   No significant weight changes noted.     PMH includes CVA, Colitis, PE. Pt p/w dehydration, food impaction.  S/p disimpaction via EGD.  Pt screened for LOS. + COVID. No n/v/d. + Formed BM 2/21. PO intake variable throughout admission - 30-90% of pureed diet.  Minimal wt hx available, but appears stable overall    Patient is at low nutrition risk at this time.    Please consult if patient status changes or nutrition issues arise.    Misty Long RD, LDN, [facility]  Clinical Dietitian  Phone [phone]

## 2025-02-21 NOTE — PROGRESS NOTES
Progress Note  Beba Mijares Patient Status:  Inpatient    1926 MRN [id number]   Location [facility] 7NE-A Attending Monique Montesinos MD   Hosp Day # 5 PCP CLARK HIGGINS     Subjective:  Reports he feels \"fine\". Wheezing, reporting dry cough, and requiring O2 at night. Episodes of brief elevated heart rate particularly if up to the bathroom but mostly NSR. No chest pain or palpitations.    Objective:  Physical Exam:   BP 93/74 (BP Location: Left arm)   Pulse 73   Temp 97.9 °F (36.6 °C) (Oral)   Resp 16   Ht 5' 6\" (1.676 m)   Wt 140 lb (63.5 kg)   SpO2 96%   BMI 22.60 kg/m²   Temp (24hrs), Av.1 °F (36.7 °C), Min:97.8 °F (36.6 °C), Max:98.4 °F (36.9 °C)     No intake or output data in the 24 hours ending 25 1341    Wt Readings from Last 3 Encounters:   25 140 lb (63.5 kg)   24 142 lb 3.2 oz (64.5 kg)   24 140 lb (63.5 kg)     Telemetry: NSR  General: Alert and oriented in bed on room air.  HEENT: No focal deficits.  Neck: No JVD, carotids 2+ no bruits.  Cardiac: Regular rate and rhythm, S1, S2 normal, rub or gallop.  Lungs: Expiratory wheezing otherwise CTA.  Normal excursions and effort.  Extremities: Without clubbing, cyanosis, 1+ edema  Neurologic: Alert and oriented, normal affect.  Skin: Warm and dry.        Intake/Output:  No intake or output data in the 24 hours ending 25 1341      Last 3 Weights   25 0644 140 lb (63.5 kg)   25 0442 140 lb 3.2 oz (63.6 kg)   02/15/25 1850 100 lb (45.4 kg)   24 0221 142 lb 3.2 oz (64.5 kg)   24 2218 140 lb (63.5 kg)   24 1137 140 lb (63.5 kg)       Labs:  Recent Labs   Lab 25  0949 25  0938 25  0551   * 179* 126*   BUN 26* 20 17   CREATSERUM 0.77 0.68* 0.67*   EGFRCR 81 84 84   CA 8.8 8.4* 8.7    137 140   K 3.9 3.2* 3.9  3.9    105 107   CO2 24.0 24.0 25.0     Recent Labs   Lab 02/15/25  1909 25  0426 25  0949 25  0617 25  0518  02/20/25  0545 02/20/25  0938   RBC 5.35 4.53 4.42  --   --   --  4.42   HGB 16.2 13.2 13.2  --   --   --  13.1   HCT 48.1 41.1 40.4  --   --   --  38.8*   MCV 89.9 90.7 91.4  --   --   --  87.8   MCH 30.3 29.1 29.9  --   --   --  29.6   MCHC 33.7 32.1 32.7  --   --   --  33.8   RDW 15.0 14.7 15.1  --   --   --  14.8   NEPRELIM 7.80* 8.43*  --   --   --   --   --    WBC 10.9 10.7 12.3*  --   --   --  6.5   .0 173.0 176.0   < > 152.0 152.0 179.0    < > = values in this interval not displayed.         No results for input(s): \"TROP\", \"TROPHS\", \"CK\" in the last 168 hours.    Diagnostics:   ECHOCARDIOGRAM 6/27/2024:  1. Left ventricle: The cavity size was normal. Wall thickness was normal.      Systolic function was normal. The estimated ejection fraction was 65-70%.      Wall motion is normal; there are no regional wall motion abnormalities.      Doppler parameters are consistent with abnormal left ventricular      relaxation - grade 1 diastolic dysfunction.   2. Right ventricle: The cavity size was normal. Systolic function was      normal.   3. Left atrium: The atrium was dilated. The left atrial volume was mildly      increased.   4. Aortic valve: The valve was trileaflet. The leaflets were mildly to      moderatley thickened and calcified. No significant stenosis but mildly      reduced cusp separation. There was mild regurgitation. The peak systolic      velocity was 1.81m/sec. The mean systolic gradient was 7mm Hg. The valve      area (VTI) was 2.09cm^2. The valve area (VTI) index was 1.24cm^2/m^2.   5. Aortic root: The aortic root was 3.8cm diameter.   6. Ascending aorta: The ascending aorta was 4.3cm diameter.   7. Pericardium, extracardiac: There was no pericardial effusion.     Medications:   albuterol  2 puff Inhalation QID    amiodarone  200 mg Oral BID with meals    aspirin  81 mg Oral Daily    isosorbide mononitrate ER  30 mg Oral Daily    lisinopril  10 mg Oral BID    pantoprazole  40 mg Oral BID  AC    rosuvastatin  10 mg Oral Daily    tamsulosin  0.4 mg Oral Nightly    mineral oil-white petrolatum   Both Eyes TID      continuous dose heparin 800 Units/hr (02/20/25 0949)    sodium chloride 83 mL/hr at 02/18/25 0625       Assessment/Plan:    Admitted with esophageal impaction having had food removed by GI on 2/16  Paroxsymal afib with RVR  Hypotension and hx of bradycardia limits AV reagan options  PO amiodarone and currently NSR  Adjusted to Eliquis 2.5 mg BID  LVEF historically preserved  Improved with IVF suspect dehydration and Covid +   Now +5L with wheezing and 1-2 + edema - attempt low dose IV lasix once  Limits AV reagan options for fib  Supportive care  Improved  HLD on statin  CAD s/p EDILIA  ASA, nitrate, statin, ACE  No chest pain  Hx of bradycardia      PLAN  Price Eliquis 2.5 mg BID and establish whether this an affordable option  Continue PO amiodarone and outpatient cardiac medication  Lasix IV 20 mg once   Likely would benefit from supportive care one more night given wheezing        Jordan Hernandez PA-C  2/21/2025  13:41    As noted,    Case discussed with nursing staff.  Medical decision making is as below.    Patient still has PAF in the setting of normal LV function.  His main issue at the moment is his pulmonary status in association with COVID-19 infection.  Continue with supportive care and p.o. amnio and Eliquis.    Dusty Dennis MD FACC  L3

## 2025-02-21 NOTE — PROGRESS NOTES
[facility]   part of [facility]     Hospitalist Progress Note     Beba Mijares Patient Status:  Inpatient    1926 MRN [id number]   Location [facility] 7NE-A Attending [place]*   Hosp Day # 5 PCP CLARK HIGGINS     Chief Complaint: Food impaction    Subjective:     Patient seen examined bedside.  No overnight events or new complaints.    Objective:    Review of Systems:   A comprehensive review of systems was completed; pertinent positive and negatives stated in subjective.    Vital signs:  Temp:  [97.8 °F (36.6 °C)-98.4 °F (36.9 °C)] 98.4 °F (36.9 °C)  Pulse:  [] 85  Resp:  [16-18] 16  BP: ()/() 123/110  SpO2:  [79 %-98 %] 96 %    Physical Exam:    General: No acute distress  Respiratory: No wheezes, no rhonchi  Cardiovascular: S1, S2, regular rate and rhythm  Abdomen: Soft, Non-tender, non-distended, positive bowel sounds  Neuro: No new focal deficits.   Extremities: No edema      Diagnostic Data:    Labs:  Recent Labs   Lab 02/15/25  1909 02/17/25  0426 02/17/25  0949 25  0617 25  0518 25  0545 25  0938   WBC 10.9 10.7 12.3*  --   --   --  6.5   HGB 16.2 13.2 13.2  --   --   --  13.1   MCV 89.9 90.7 91.4  --   --   --  87.8   .0 173.0 176.0 141.0* 152.0 152.0 179.0       Recent Labs   Lab 02/15/25  2002 02/17/25  0427 02/17/25  0949 25  0938 25  0551   *   < > 111* 179* 126*   BUN 24*   < > 26* 20 17   CREATSERUM 0.87   < > 0.77 0.68* 0.67*   CA 9.1   < > 8.8 8.4* 8.7   ALB 4.2  --   --   --   --       < > 144 137 140   K 4.4   < > 3.9 3.2* 3.9  3.9      < > 109 105 107   CO2 23.0   < > 24.0 24.0 25.0   ALKPHO 58  --   --   --   --    AST 26  --   --   --   --    ALT 14  --   --   --   --    BILT 1.1*  --   --   --   --    TP 7.4  --   --   --   --     < > = values in this interval not displayed.       Estimated Glomerular Filtration Rate: 84.4 mL/min/1.73m2 (A) (by CKD-EPI based on SCr of 0.67  mg/dL (L)).    No results for input(s): \"TROP\", \"TROPHS\", \"CK\" in the last 168 hours.    No results for input(s): \"PTP\", \"INR\" in the last 168 hours.               Microbiology    No results found for this visit on 02/15/25.      Imaging: Reviewed in Epic.    Medications:    albuterol  2 puff Inhalation QID    amiodarone  200 mg Oral BID with meals    aspirin  81 mg Oral Daily    isosorbide mononitrate ER  30 mg Oral Daily    lisinopril  10 mg Oral BID    pantoprazole  40 mg Oral BID AC    rosuvastatin  10 mg Oral Daily    tamsulosin  0.4 mg Oral Nightly    mineral oil-white petrolatum   Both Eyes TID       Assessment & Plan:      #  Intractable N/V  -CT abd reviewed, no e/o obstruction   -EKG NSR, no acute st changes  -CXR clear, no consolidation   -IVF's, pureed diet   -Pt had food impaction removal on 2/16    # New onset atrial fibrillation with RVR  - cardiology consulted  -amiodarone converted to oral today  -heparin gt- DOAC per Cards    #CAD  -ASA, statin, ace, imdur     #Essential HTN  -Lisinopril      #GERD  -PPI     #HLD  -Statin     #COVID positive  -asymptomatic,  exposure to wife who is positive      [place], DO    Supplementary Documentation:     Quality:  DVT Mechanical Prophylaxis:     Early ambuation  DVT Pharmacologic Prophylaxis   Medication    heparin (Porcine) 49723 units/250mL infusion ACS/AFIB CONTINUOUS      DVT Pharmacologic prophylaxis: Aspirin 162 mg         Code Status: Prior  Jennings: No urinary catheter in place  Jennings Duration (in days):   Central line:    ELIN:     Discharge is dependent on: Clinical improvement  At this point Mr. Mijares is expected to be discharge to: Home    The 21st Century Cures Act makes medical notes like these available to patients in the interest of transparency. Please be advised this is a medical document. Medical documents are intended to carry relevant information, facts as evident, and the clinical opinion of the practitioner. The medical  note is intended as peer to peer communication and may appear blunt or direct. It is written in medical language and may contain abbreviations or verbiage that are unfamiliar.

## 2025-02-21 NOTE — DISCHARGE INSTRUCTIONS
ELIQUIS 360 Support prescription coverage assistance     Find out if you’re covered by calling [phone] ([phone]) or by visiting [email or web address]     Understanding prescription drug insurance coverage can be complicated and time consuming. Live specialists are here to:  - Help you find out if ELIQUIS is covered by your insurance plan  - Determine if you are eligible for assistance paying for ELIQUIS  - Check if you qualify for the ELIQUIS Co-pay Card  - Assist with prior authorization and formulary exception requests  - Our live specialists can contact your insurance company to research your prescription insurance benefits for ELIQUIS and explain them to you--including whether ELIQUIS is covered by your plan. Before we can contact your health plan to confirm your coverage details, you must sign a Patient Authorization and Agreement form. You can provide your e-signature by visiting [email or web address]. If you have questions along the way, our live specialists are available at [phone] ([phone]), Monday - Friday 8 AM - 8 PM ET    The [employer] Patient Assistance Foundation (BMSPAF) is an independent charitable organization that provides certain [employer] medicines to eligible patients free of charge.    We can assist you with any questions you may have about [employer]. Please call [phone] or visit [email or web address] for additional information.     Our program associates are available:  Monday through Friday,  8:00 AM - 8:00 PM ET (excluding holidays)     How the program works  Certain BMS prescription medicines are available free of charge to people who meet the program eligibility requirements and live in the [place] or a [place] territory. See if you may be eligible.  There’s no cost to apply.  You and your healthcare provider both need to complete, sign and date the application.  If you’re approved, you’ll receive your medicine free of charge for up to one  year. If you need continued assistance after that time, you can reapply.

## 2025-02-21 NOTE — CM/SW NOTE
Chart reviewed for continued stay. Pt admitted on 2/16 due to food impaction of esophagus. Recommendations for pureed diet. Pt found to have new onset of Afib w RVR- continues on drip. Antic DOAC at DC. Pt also COVID+, per reports wife is also hospitalized due to symptoms.     SW/CM to remain available for DC planning needs/recommendations.     Addendum 2:20- SW consult for DOAC pricing. Notified by [facility] pharmacy copay for Eliquis is $496.75 after 30 day free. Notified RN/PA. Will provide application form for "[employer] Patient Assistance Foundation should pt be eligible. Please note, form will need Provider information and signature. Per PA- will dc on 30 day free of Eliquis and determine long term plan as outpatient appointment.     MARTIN Marino

## 2025-02-22 VITALS
TEMPERATURE: 97 F | RESPIRATION RATE: 20 BRPM | HEIGHT: 66 IN | HEART RATE: 69 BPM | SYSTOLIC BLOOD PRESSURE: 90 MMHG | OXYGEN SATURATION: 95 % | BODY MASS INDEX: 22.5 KG/M2 | DIASTOLIC BLOOD PRESSURE: 62 MMHG | WEIGHT: 140 LBS

## 2025-02-22 LAB
ANION GAP SERPL CALC-SCNC: 8 MMOL/L (ref 0–18)
APTT PPP: 28.6 SECONDS (ref 23–36)
BUN BLD-MCNC: 15 MG/DL (ref 9–23)
CALCIUM BLD-MCNC: 8.6 MG/DL (ref 8.7–10.6)
CHLORIDE SERPL-SCNC: 105 MMOL/L (ref 98–112)
CO2 SERPL-SCNC: 29 MMOL/L (ref 21–32)
CREAT BLD-MCNC: 0.76 MG/DL
EGFRCR SERPLBLD CKD-EPI 2021: 81 ML/MIN/1.73M2 (ref 60–?)
ERYTHROCYTE [DISTWIDTH] IN BLOOD BY AUTOMATED COUNT: 14.7 %
GLUCOSE BLD-MCNC: 127 MG/DL (ref 70–99)
HCT VFR BLD AUTO: 38.2 %
HGB BLD-MCNC: 12.7 G/DL
MCH RBC QN AUTO: 29.3 PG (ref 26–34)
MCHC RBC AUTO-ENTMCNC: 33.2 G/DL (ref 31–37)
MCV RBC AUTO: 88.2 FL
OSMOLALITY SERPL CALC.SUM OF ELEC: 296 MOSM/KG (ref 275–295)
PLATELET # BLD AUTO: 182 10(3)UL (ref 150–450)
POTASSIUM SERPL-SCNC: 3.8 MMOL/L (ref 3.5–5.1)
RBC # BLD AUTO: 4.33 X10(6)UL
SODIUM SERPL-SCNC: 142 MMOL/L (ref 136–145)
WBC # BLD AUTO: 6.6 X10(3) UL (ref 4–11)

## 2025-02-22 PROCEDURE — 99239 HOSP IP/OBS DSCHRG MGMT >30: CPT | Performed by: HOSPITALIST

## 2025-02-22 NOTE — PLAN OF CARE
Assumed care at 1930  Alert and oriented x3   NSR/A fib on tele and on RA  Denies any pain/discomfort   Meds whole w/ applesauce  Up SBA  Isolation precautions in place & maintained   Call light w/in reach

## 2025-02-22 NOTE — PLAN OF CARE
Assumed patient care 0730  Patient alert and oriented X3  On room air, VSS, NSR on tele  Patient denies of any pain  Up with standby assist, Voiding,   Tolerating pureed diet  Patient and family member at bedside updated on plan of care   Eliquis 30-day coupon given to patient   APRN stated their office will submit form for the patient, relayed to family member  Pt cleared by consults for discharge        NURSING DISCHARGE NOTE  All discharge documentation including AVS, follow ups, and medications reviewed with patient and family member, and verbalized understanding. Signs and symptoms when to call 911 discussed with patient and family member at bedside, verbalized understanding.   Discharged Home via Wheelchair.  Accompanied by Family member  Belongings taken by patient/family.    Problem: Patient/Family Goals  Goal: Patient/Family Long Term Goal  Description: Patient's Long Term Goal: able to tolerate diet and stay healthy    Interventions: IVF , antiemetics , monitor electrolytes   -   - See additional Care Plan goals for specific interventions  Outcome: Adequate for Discharge  Goal: Patient/Family Short Term Goal  Description: Patient's Short Term Goal: stop vomiting     Interventions: IVF antiemetics   -   - See additional Care Plan goals for specific interventions  Outcome: Adequate for Discharge     Problem: RESPIRATORY - ADULT  Goal: Achieves optimal ventilation and oxygenation  Description: INTERVENTIONS:  - Assess for changes in respiratory status  - Assess for changes in mentation and behavior  - Position to facilitate oxygenation and minimize respiratory effort  - Oxygen supplementation based on oxygen saturation or ABGs  - Provide Smoking Cessation handout, if applicable  - Encourage broncho-pulmonary hygiene including cough, deep breathe, Incentive Spirometry  - Assess the need for suctioning and perform as needed  - Assess and instruct to report SOB or any respiratory difficulty  - Respiratory  Therapy support as indicated  - Manage/alleviate anxiety  - Monitor for signs/symptoms of CO2 retention  Outcome: Adequate for Discharge     Problem: CARDIOVASCULAR - ADULT  Goal: Maintains optimal cardiac output and hemodynamic stability  Description: INTERVENTIONS:  - Monitor vital signs, rhythm, and trends  - Monitor for bleeding, hypotension and signs of decreased cardiac output  - Evaluate effectiveness of vasoactive medications to optimize hemodynamic stability  - Monitor arterial and/or venous puncture sites for bleeding and/or hematoma  - Assess quality of pulses, skin color and temperature  - Assess for signs of decreased coronary artery perfusion - ex. Angina  - Evaluate fluid balance, assess for edema, trend weights  Outcome: Adequate for Discharge  Goal: Absence of cardiac arrhythmias or at baseline  Description: INTERVENTIONS:  - Continuous cardiac monitoring, monitor vital signs, obtain 12 lead EKG if indicated  - Evaluate effectiveness of antiarrhythmic and heart rate control medications as ordered  - Initiate emergency measures for life threatening arrhythmias  - Monitor electrolytes and administer replacement therapy as ordered  Outcome: Adequate for Discharge

## 2025-02-22 NOTE — DISCHARGE SUMMARY
[facility]     Beba Mijares Patient Status:  Inpatient    1926 MRN [id number]   Location [facility] 7NE-A Attending [place]*   Hosp Day # 6 PCP CLARK HIGGINS     Date of Admission: 2/15/2025  Date of Discharge:   ***    Discharge Disposition: Home or Self Care    Discharge Diagnosis:  ***    History of Present Illness: ***    Brief Synopsis: ***    Lace+ Score: 71  59-90 High Risk  29-58 Medium Risk  0-28   Low Risk       TCM Follow-Up Recommendation:  {Care Managers will evaluate the need for follow-up for all patients ages 50+, and high/moderate risk patients ages 25-49. Low risk patients (LACE < 29) will only be evaluated if the \"Still recommend for TCM follow-up\" option is selected from this list.:7396}      Procedures during hospitalization:   ***    Incidental or significant findings and recommendations (brief descriptions):  ***    Lab/Test results pending at Discharge:   ***    Consultants:  ***    Discharge Medication List:     Discharge Medications        START taking these medications        Instructions Prescription details   amiodarone 200 MG Tabs  Commonly known as: Pacerone  Start taking on: 2025      Take 1 tablet (200 mg total) by mouth 2 (two) times daily for 7 days, THEN 1 tablet (200 mg total) daily.   Stop taking on: 2025  Quantity: 44 tablet  Refills: 1     apixaban 2.5 MG Tabs  Commonly known as: Eliquis      Take 1 tablet (2.5 mg total) by mouth 2 (two) times daily.   Quantity: 60 tablet  Refills: 0            CONTINUE taking these medications        Instructions Prescription details   aspirin 81 MG Chew      Chew 1 tablet (81 mg total) by mouth daily.   Refills: 0     isosorbide mononitrate ER 30 MG Tb24  Commonly known as: Imdur      Take 1 tablet (30 mg total) by mouth daily.   Refills: 0     lisinopril 10 MG Tabs  Commonly known as: Zestril      Take 1 tablet (10 mg total) by mouth 2 (two) times daily.    Refills: 0     multivitamin Tabs      Take 1 tablet by mouth at bedtime.   Refills: 0     Nitrostat 0.4 MG Subl  Generic drug: nitroglycerin      nitroGLYcerin (NITROSTAT) 0.4 MG sublingual tablet, [RxNorm: 292236]   Refills: 0     pantoprazole 40 MG Tbec  Commonly known as: Protonix      Take 1 tablet (40 mg total) by mouth 2 (two) times daily before meals.   Refills: 0     rosuvastatin 10 MG Tabs  Commonly known as: Crestor      Take 1 tablet (10 mg total) by mouth daily.   Refills: 0     tamsulosin 0.4 MG Caps  Commonly known as: Flomax      TAKE 1 CAPSULE(0.4 MG) BY MOUTH EVERY NIGHT. SWALLOW WHOLE   Refills: 0               Where to Get Your Medications        These medications were sent to [facility] [address], [address] [phone], [phone]  [address] [place] [address], [address], [facility] [address]      Phone: [phone]   apixaban 2.5 MG Tabs       These medications were sent to [id number] [address] AT [facility] [address]  & , [phone], [phone] [address], [facility] [address]      Phone: [phone]   amiodarone 200 MG Tabs         [facility] ***    Follow-up appointment:   Luma Hazel MD  [address] [place] [id number] [phone]    Follow up  our office will call you for an apointment    Appointments for Next 30 Days 2/22/2025 - 3/24/2025      None            Vital signs:  Temp:  [97.3 °F (36.3 °C)-97.9 °F (36.6 °C)] 97.7 °F (36.5 °C)  Pulse:  [56-98] 74  Resp:  [16-18] 18  BP: ()/(67-78) 116/78  SpO2:  [93 %-98 %] 98 %    Physical Exam:    General: No acute distress   Lungs: clear to auscultation  Cardiovascular: S1, S2  Abdomen: Soft  ***    -----------------------------------------------------------------------------------------------  PATIENT DISCHARGE INSTRUCTIONS: See electronic chart    [place] DO    Total time spent on discharge planning:  ***  minutes     The 21st Century Cures Act makes medical notes like these available to patients in the interest of transparency. Please be advised this is a medical document. Medical documents are intended to carry relevant information, facts as evident, and the clinical opinion of the practitioner. The medical note is intended as peer to peer communication and may appear blunt or direct. It is written in medical language and may contain abbreviations or verbiage that are unfamiliar.

## 2025-02-22 NOTE — PROGRESS NOTES
[facility]   part of [facility]     Hospitalist Progress Note     Beba Mijares Patient Status:  Inpatient    1926 MRN [id number]   Location [facility] 7NE-A Attending [place]*   Hosp Day # 6 PCP CLARK HIGGINS     Chief Complaint: Food impaction    Subjective:     Patient seen examined bedside.  No overnight events or new complaints.    Objective:    Review of Systems:   A comprehensive review of systems was completed; pertinent positive and negatives stated in subjective.    Vital signs:  Temp:  [97.3 °F (36.3 °C)-97.9 °F (36.6 °C)] 97.7 °F (36.5 °C)  Pulse:  [56-98] 74  Resp:  [16-18] 18  BP: ()/(67-78) 116/78  SpO2:  [93 %-98 %] 98 %    Physical Exam:    General: No acute distress  Respiratory: No wheezes, no rhonchi  Cardiovascular: S1, S2, regular rate and rhythm  Abdomen: Soft, Non-tender, non-distended, positive bowel sounds  Neuro: No new focal deficits.   Extremities: No edema      Diagnostic Data:    Labs:  Recent Labs   Lab 02/15/25  1909 02/17/25  0426 02/17/25  0949 25  0617 25  0518 25  0545 25  0938 25  0530   WBC 10.9 10.7 12.3*  --   --   --  6.5 6.6   HGB 16.2 13.2 13.2  --   --   --  13.1 12.7*   MCV 89.9 90.7 91.4  --   --   --  87.8 88.2   .0 173.0 176.0 141.0* 152.0 152.0 179.0 182.0       Recent Labs   Lab 02/15/25  2002 02/17/25  04225  0938 25  0551 25  0537   *   < > 179* 126* 127*   BUN 24*   < > 20 17 15   CREATSERUM 0.87   < > 0.68* 0.67* 0.76   CA 9.1   < > 8.4* 8.7 8.6*   ALB 4.2  --   --   --   --       < > 137 140 142   K 4.4   < > 3.2* 3.9  3.9 3.8      < > 105 107 105   CO2 23.0   < > 24.0 25.0 29.0   ALKPHO 58  --   --   --   --    AST 26  --   --   --   --    ALT 14  --   --   --   --    BILT 1.1*  --   --   --   --    TP 7.4  --   --   --   --     < > = values in this interval not displayed.       Estimated Glomerular Filtration Rate: 81.2 mL/min/1.73m2 (by  CKD-EPI based on SCr of 0.76 mg/dL).    No results for input(s): \"TROP\", \"TROPHS\", \"CK\" in the last 168 hours.    No results for input(s): \"PTP\", \"INR\" in the last 168 hours.       Microbiology    No results found for this visit on 02/15/25.      Imaging: Reviewed in Epic.    Medications:    albuterol  2 puff Inhalation QID    apixaban  2.5 mg Oral BID    amiodarone  200 mg Oral BID with meals    aspirin  81 mg Oral Daily    isosorbide mononitrate ER  30 mg Oral Daily    lisinopril  10 mg Oral BID    pantoprazole  40 mg Oral BID AC    rosuvastatin  10 mg Oral Daily    tamsulosin  0.4 mg Oral Nightly    mineral oil-white petrolatum   Both Eyes TID       Assessment & Plan:      #  Intractable N/V  -CT abd reviewed, no e/o obstruction   -EKG NSR, no acute st changes  -CXR clear, no consolidation   -IVF's, pureed diet   -Pt had food impaction removal on 2/16    # New onset atrial fibrillation with RVR  - cardiology consulted  -amiodarone converted to oral today  -heparin gt- DOAC per Cards    #CAD  -ASA, statin, ace, imdur     #Essential HTN  -Lisinopril      #GERD  -PPI     #HLD  -Statin     #COVID positive  -asymptomatic,  exposure to wife who is positive      [place],     Supplementary Documentation:     Quality:  DVT Mechanical Prophylaxis:     Early ambuation  DVT Pharmacologic Prophylaxis   Medication    apixaban (Eliquis) tab 2.5 mg      DVT Pharmacologic prophylaxis: Aspirin 162 mg         Code Status: Prior  Jennings: No urinary catheter in place  Jennings Duration (in days):   Central line:    ELIN:     Discharge is dependent on: Clinical improvement  At this point Mr. Mijares is expected to be discharge to: Home    The 21st Century Cures Act makes medical notes like these available to patients in the interest of transparency. Please be advised this is a medical document. Medical documents are intended to carry relevant information, facts as evident, and the clinical opinion of the practitioner.  The medical note is intended as peer to peer communication and may appear blunt or direct. It is written in medical language and may contain abbreviations or verbiage that are unfamiliar.

## 2025-02-24 NOTE — PAYOR COMM NOTE
--------------  DISCHARGE REVIEW    Payor: [employer]  Subscriber #:  [id number]  Authorization Number: [id number]    Admit date: 2/16/25  Admit time:   1:15 AM  Discharge Date: 2/22/2025  4:26 PM     Admitting Physician: Mark Day DO  Attending Physician:  No att. providers found  Primary Care Physician: Ashish Mills            Subjective:  Patient seen examined bedside.  No overnight events or new complaints.        Objective:  Review of Systems:   A comprehensive review of systems was completed; pertinent positive and negatives stated in subjective.     Vital signs:  Temp:  [97.8 °F (36.6 °C)-98.4 °F (36.9 °C)] 98.4 °F (36.9 °C)  Pulse:  [] 85  Resp:  [16-18] 16  BP: ()/() 123/110  SpO2:  [79 %-98 %] 96 %     Physical Exam:    General: No acute distress  Respiratory: No wheezes, no rhonchi  Cardiovascular: S1, S2, regular rate and rhythm  Abdomen: Soft, Non-tender, non-distended, positive bowel sounds  Neuro: No new focal deficits.   Extremities: No edema        Diagnostic Data:    Labs:            Recent Labs   Lab 02/15/25  1909 02/17/25  0426 02/17/25  0949 02/18/25  0617 02/19/25  0518 02/20/25  0545 02/20/25  0938   WBC 10.9 10.7 12.3*  --   --   --  6.5   HGB 16.2 13.2 13.2  --   --   --  13.1   MCV 89.9 90.7 91.4  --   --   --  87.8   .0 173.0 176.0 141.0* 152.0 152.0 179.0                 Recent Labs   Lab 02/15/25  2002 02/17/25  0427 02/17/25  0949 02/20/25  0938 02/21/25  0551   *   < > 111* 179* 126*   BUN 24*   < > 26* 20 17   CREATSERUM 0.87   < > 0.77 0.68* 0.67*   CA 9.1   < > 8.8 8.4* 8.7   ALB 4.2  --   --   --   --       < > 144 137 140   K 4.4   < > 3.9 3.2* 3.9  3.9      < > 109 105 107   CO2 23.0   < > 24.0 24.0 25.0   ALKPHO 58  --   --   --   --    AST 26  --   --   --   --    ALT 14  --   --   --   --    BILT 1.1*  --   --   --   --    TP 7.4  --   --   --   --     < > = values in this interval not displayed.          Estimated Glomerular Filtration Rate: 84.4 mL/min/1.73m2 (A) (by CKD-EPI based on SCr of 0.67 mg/dL (L)).     No results for input(s): \"TROP\", \"TROPHS\", \"CK\" in the last 168 hours.     No results for input(s): \"PTP\", \"INR\" in the last 168 hours.                 Microbiology     No results found for this visit on 02/15/25.        Imaging: Reviewed in Epic.     Medications:   Scheduled Medications    albuterol  2 puff Inhalation QID    amiodarone  200 mg Oral BID with meals    aspirin  81 mg Oral Daily    isosorbide mononitrate ER  30 mg Oral Daily    lisinopril  10 mg Oral BID    pantoprazole  40 mg Oral BID AC    rosuvastatin  10 mg Oral Daily    tamsulosin  0.4 mg Oral Nightly    mineral oil-white petrolatum   Both Eyes TID               Assessment & Plan:  #  Intractable N/V  -CT abd reviewed, no e/o obstruction   -EKG NSR, no acute st changes  -CXR clear, no consolidation   -IVF's, pureed diet   -Pt had food impaction removal on 2/16     # New onset atrial fibrillation with RVR  - cardiology consulted  -amiodarone converted to oral today  -heparin gt- DOAC per Cards    #CAD  -ASA, statin, ace, imdur     #Essential HTN  -Lisinopril      #GERD  -PPI     #HLD  -Statin     #COVID positive  -asymptomatic,  exposure to wife who is positive        [place] DO        Supplementary Documentation:  Quality:  DVT Mechanical Prophylaxis:     Early ambuation      DVT Pharmacologic Prophylaxis   Medication    heparin (Porcine) 72762 units/250mL infusion ACS/AFIB CONTINUOUS      DVT Pharmacologic prophylaxis: Aspirin 162 mg          Code Status: Prior  Jennings: No urinary catheter in place  Jennings Duration (in days):   Central line:    ELIN:      Discharge is dependent on: Clinical improvement  At this point Mr. Mijares is expected to be discharge to: Home                      Electronically signed by [place] DO at 2/21/2025 11:56 AM    Chief Complaint: Vomiting         Subjective:  Feels  well, no complaints, denies chest pain, palpitations        Objective:  Review of Systems:   A comprehensive review of systems was completed; pertinent positive and negatives stated in subjective.     Vital signs:  Temp:  [97.5 °F (36.4 °C)-98.9 °F (37.2 °C)] 97.8 °F (36.6 °C)  Pulse:  [] 127  Resp:  [13-26] 22  BP: ()/(64-86) 125/82  SpO2:  [92 %-96 %] 93 %     Physical Exam:    General: No acute distress  Respiratory: No wheezes, no rhonchi  Cardiovascular: S1, S2, regular rate and rhythm  Abdomen: Soft, Non-tender, non-distended, positive bowel sounds  Neuro: No new focal deficits.   Extremities: No edema        Diagnostic Data:    Labs:            Recent Labs   Lab 02/15/25  1909 02/17/25  0426 02/17/25  0949 02/18/25  0617 02/19/25  0518 02/20/25  0545 02/20/25  0938   WBC 10.9 10.7 12.3*  --   --   --  6.5   HGB 16.2 13.2 13.2  --   --   --  13.1   MCV 89.9 90.7 91.4  --   --   --  87.8   .0 173.0 176.0 141.0* 152.0 152.0 179.0                Recent Labs   Lab 02/15/25  2002 02/17/25  0427 02/17/25  0949 02/20/25  0938   * 137* 111* 179*   BUN 24* 24* 26* 20   CREATSERUM 0.87 0.78 0.77 0.68*   CA 9.1 9.0 8.8 8.4*   ALB 4.2  --   --   --     143 144 137   K 4.4 3.9 3.9 3.2*    108 109 105   CO2 23.0 25.0 24.0 24.0   ALKPHO 58  --   --   --    AST 26  --   --   --    ALT 14  --   --   --    BILT 1.1*  --   --   --    TP 7.4  --   --   --          Estimated Glomerular Filtration Rate: 84 mL/min/1.73m2 (A) (by CKD-EPI based on SCr of 0.68 mg/dL (L)).     No results for input(s): \"TROP\", \"TROPHS\", \"CK\" in the last 168 hours.     No results for input(s): \"PTP\", \"INR\" in the last 168 hours.     Microbiology     No results found for this visit on 02/15/25.        Imaging: Reviewed in Epic.     Medications:   Scheduled Medications    amiodarone  400 mg Oral BID with meals    potassium chloride  40 mEq Oral Once    aspirin  81 mg Oral Daily    isosorbide mononitrate ER  30 mg Oral  Daily    lisinopril  10 mg Oral BID    pantoprazole  40 mg Oral BID AC    rosuvastatin  10 mg Oral Daily    tamsulosin  0.4 mg Oral Nightly    mineral oil-white petrolatum   Both Eyes TID               Assessment & Plan:  # Intractable N/V  -CT abd reviewed, no e/o obstruction   -EKG NSR, no acute st changes  -CXR clear, no consolidation   -IVF's, pureed diet   -Pt had food impaction removal on Sunday.     # New onset atrial fibrillation with RVR  - cardiology consulted  -amiodarone converted to oral today  -heparin gt- DOAC per Cards    #CAD  -ASA, statin, ace, imdur     #Essential HTN  -Lisinopril      #GERD  -PPI     #HLD  -Statin     #COVID positive  -asymptomatic,  exposure to wife who is positive     Dc planning     Monique Montesinos M.D.  [facility] Hospitalist         Supplementary Documentation:  Quality:  DVT Mechanical Prophylaxis:     Early ambuation      DVT Pharmacologic Prophylaxis   Medication    heparin (Porcine) 65874 units/250mL infusion ACS/AFIB CONTINUOUS      DVT Pharmacologic prophylaxis: Aspirin 162 mg          Code Status: Prior  Jennings: No urinary catheter in place  Jennings Duration (in days):   Central line:    ELIN:      Discharge is dependent on: clinical improvement  At this point Mr. Mijares is expected to be discharge to: TBD                       Electronically signed by Monique Montesinos MD at 2/20/2025 11:49 AM

## 2025-06-01 ENCOUNTER — APPOINTMENT (OUTPATIENT)
Dept: GENERAL RADIOLOGY | Facility: HOSPITAL | Age: OVER 89
End: 2025-06-01
Attending: EMERGENCY MEDICINE
Payer: MEDICARE

## 2025-06-01 ENCOUNTER — APPOINTMENT (OUTPATIENT)
Dept: CT IMAGING | Facility: HOSPITAL | Age: OVER 89
End: 2025-06-01
Attending: EMERGENCY MEDICINE
Payer: MEDICARE

## 2025-06-01 ENCOUNTER — HOSPITAL ENCOUNTER (INPATIENT)
Facility: HOSPITAL | Age: OVER 89
LOS: 7 days | Discharge: HOME HEALTH | End: 2025-06-08
Attending: EMERGENCY MEDICINE | Admitting: INTERNAL MEDICINE
Payer: MEDICARE

## 2025-06-01 DIAGNOSIS — J96.01 ACUTE HYPOXEMIC RESPIRATORY FAILURE (HCC): Primary | ICD-10-CM

## 2025-06-01 DIAGNOSIS — J18.9 COMMUNITY ACQUIRED PNEUMONIA, UNSPECIFIED LATERALITY: ICD-10-CM

## 2025-06-01 DIAGNOSIS — I50.9 ACUTE ON CHRONIC CONGESTIVE HEART FAILURE, UNSPECIFIED HEART FAILURE TYPE (HCC): ICD-10-CM

## 2025-06-01 DIAGNOSIS — J98.01 ACUTE BRONCHOSPASM: ICD-10-CM

## 2025-06-01 LAB
ALBUMIN SERPL-MCNC: 3.6 G/DL (ref 3.2–4.8)
ALBUMIN/GLOB SERPL: 1.2 {RATIO} (ref 1–2)
ALP LIVER SERPL-CCNC: 87 U/L (ref 45–117)
ALT SERPL-CCNC: 30 U/L (ref 10–49)
ANION GAP SERPL CALC-SCNC: 7 MMOL/L (ref 0–18)
APTT PPP: 23.3 SECONDS (ref 23–36)
AST SERPL-CCNC: 30 U/L (ref ?–34)
BASOPHILS # BLD AUTO: 0.04 X10(3) UL (ref 0–0.2)
BASOPHILS NFR BLD AUTO: 0.3 %
BILIRUB SERPL-MCNC: 0.6 MG/DL (ref 0.2–0.9)
BUN BLD-MCNC: 16 MG/DL (ref 9–23)
CALCIUM BLD-MCNC: 8.7 MG/DL (ref 8.7–10.6)
CHLORIDE SERPL-SCNC: 96 MMOL/L (ref 98–112)
CO2 SERPL-SCNC: 28 MMOL/L (ref 21–32)
CREAT BLD-MCNC: 0.72 MG/DL (ref 0.7–1.3)
EGFRCR SERPLBLD CKD-EPI 2021: 83 ML/MIN/1.73M2 (ref 60–?)
EOSINOPHIL # BLD AUTO: 0.04 X10(3) UL (ref 0–0.7)
EOSINOPHIL NFR BLD AUTO: 0.3 %
ERYTHROCYTE [DISTWIDTH] IN BLOOD BY AUTOMATED COUNT: 16 %
GLOBULIN PLAS-MCNC: 2.9 G/DL (ref 2–3.5)
GLUCOSE BLD-MCNC: 120 MG/DL (ref 70–99)
HCT VFR BLD AUTO: 38.5 % (ref 39–53)
HGB BLD-MCNC: 13.1 G/DL (ref 13–17.5)
IMM GRANULOCYTES # BLD AUTO: 0.15 X10(3) UL (ref 0–1)
IMM GRANULOCYTES NFR BLD: 1.2 %
INR BLD: 1.25 (ref 0.8–1.2)
LACTATE SERPL-SCNC: 1.2 MMOL/L (ref 0.5–2)
LYMPHOCYTES # BLD AUTO: 1.64 X10(3) UL (ref 1–4)
LYMPHOCYTES NFR BLD AUTO: 13.2 %
MCH RBC QN AUTO: 28.4 PG (ref 26–34)
MCHC RBC AUTO-ENTMCNC: 34 G/DL (ref 31–37)
MCV RBC AUTO: 83.5 FL (ref 80–100)
MONOCYTES # BLD AUTO: 1.05 X10(3) UL (ref 0.1–1)
MONOCYTES NFR BLD AUTO: 8.5 %
NEUTROPHILS # BLD AUTO: 9.48 X10 (3) UL (ref 1.5–7.7)
NEUTROPHILS # BLD AUTO: 9.48 X10(3) UL (ref 1.5–7.7)
NEUTROPHILS NFR BLD AUTO: 76.5 %
NT-PROBNP SERPL-MCNC: 5393 PG/ML (ref ?–450)
OSMOLALITY SERPL CALC.SUM OF ELEC: 274 MOSM/KG (ref 275–295)
PLATELET # BLD AUTO: 386 10(3)UL (ref 150–450)
POTASSIUM SERPL-SCNC: 4.7 MMOL/L (ref 3.5–5.1)
PROT SERPL-MCNC: 6.5 G/DL (ref 5.7–8.2)
PROTHROMBIN TIME: 15.8 SECONDS (ref 11.6–14.8)
RBC # BLD AUTO: 4.61 X10(6)UL (ref 3.8–5.8)
SODIUM SERPL-SCNC: 131 MMOL/L (ref 136–145)
TROPONIN I SERPL HS-MCNC: 23 NG/L (ref ?–53)
WBC # BLD AUTO: 12.4 X10(3) UL (ref 4–11)

## 2025-06-01 PROCEDURE — 70450 CT HEAD/BRAIN W/O DYE: CPT | Performed by: EMERGENCY MEDICINE

## 2025-06-01 PROCEDURE — 71045 X-RAY EXAM CHEST 1 VIEW: CPT | Performed by: EMERGENCY MEDICINE

## 2025-06-01 PROCEDURE — 99223 1ST HOSP IP/OBS HIGH 75: CPT | Performed by: INTERNAL MEDICINE

## 2025-06-01 RX ORDER — DOXYCYCLINE 100 MG/1
100 CAPSULE ORAL 2 TIMES DAILY
Status: DISCONTINUED | OUTPATIENT
Start: 2025-06-02 | End: 2025-06-04

## 2025-06-01 RX ORDER — ONDANSETRON 2 MG/ML
4 INJECTION INTRAMUSCULAR; INTRAVENOUS EVERY 6 HOURS PRN
Status: DISCONTINUED | OUTPATIENT
Start: 2025-06-01 | End: 2025-06-01

## 2025-06-01 RX ORDER — BISACODYL 10 MG
10 SUPPOSITORY, RECTAL RECTAL
Status: DISCONTINUED | OUTPATIENT
Start: 2025-06-01 | End: 2025-06-08

## 2025-06-01 RX ORDER — PANTOPRAZOLE SODIUM 40 MG/1
40 TABLET, DELAYED RELEASE ORAL
Status: DISCONTINUED | OUTPATIENT
Start: 2025-06-02 | End: 2025-06-08

## 2025-06-01 RX ORDER — BENZONATATE 200 MG/1
200 CAPSULE ORAL 3 TIMES DAILY PRN
Status: DISCONTINUED | OUTPATIENT
Start: 2025-06-01 | End: 2025-06-08

## 2025-06-01 RX ORDER — ONDANSETRON 2 MG/ML
4 INJECTION INTRAMUSCULAR; INTRAVENOUS EVERY 4 HOURS PRN
Status: DISCONTINUED | OUTPATIENT
Start: 2025-06-01 | End: 2025-06-01

## 2025-06-01 RX ORDER — SENNOSIDES 8.6 MG
17.2 TABLET ORAL NIGHTLY PRN
Status: DISCONTINUED | OUTPATIENT
Start: 2025-06-01 | End: 2025-06-08

## 2025-06-01 RX ORDER — ASPIRIN 81 MG/1
81 TABLET, CHEWABLE ORAL DAILY
Status: DISCONTINUED | OUTPATIENT
Start: 2025-06-02 | End: 2025-06-08

## 2025-06-01 RX ORDER — ACETAMINOPHEN 500 MG
500 TABLET ORAL EVERY 4 HOURS PRN
Status: DISCONTINUED | OUTPATIENT
Start: 2025-06-01 | End: 2025-06-08

## 2025-06-01 RX ORDER — POLYETHYLENE GLYCOL 3350 17 G/17G
17 POWDER, FOR SOLUTION ORAL DAILY PRN
Status: DISCONTINUED | OUTPATIENT
Start: 2025-06-01 | End: 2025-06-03

## 2025-06-01 RX ORDER — ROSUVASTATIN CALCIUM 10 MG/1
10 TABLET, COATED ORAL DAILY
Status: DISCONTINUED | OUTPATIENT
Start: 2025-06-02 | End: 2025-06-08

## 2025-06-01 RX ORDER — ALBUTEROL SULFATE 5 MG/ML
10 SOLUTION RESPIRATORY (INHALATION) ONCE
Status: COMPLETED | OUTPATIENT
Start: 2025-06-01 | End: 2025-06-01

## 2025-06-01 RX ORDER — BUMETANIDE 0.5 MG/1
0.5 TABLET ORAL DAILY
COMMUNITY
End: 2025-06-08

## 2025-06-01 RX ORDER — METOCLOPRAMIDE HYDROCHLORIDE 5 MG/ML
5 INJECTION INTRAMUSCULAR; INTRAVENOUS EVERY 8 HOURS PRN
Status: DISCONTINUED | OUTPATIENT
Start: 2025-06-01 | End: 2025-06-08

## 2025-06-01 RX ORDER — AMIODARONE HYDROCHLORIDE 200 MG/1
200 TABLET ORAL EVERY EVENING
Status: DISCONTINUED | OUTPATIENT
Start: 2025-06-01 | End: 2025-06-08

## 2025-06-01 RX ORDER — AZITHROMYCIN 250 MG/1
500 TABLET, FILM COATED ORAL DAILY
Status: DISCONTINUED | OUTPATIENT
Start: 2025-06-02 | End: 2025-06-01

## 2025-06-01 RX ORDER — ENOXAPARIN SODIUM 100 MG/ML
40 INJECTION SUBCUTANEOUS DAILY
Status: DISCONTINUED | OUTPATIENT
Start: 2025-06-02 | End: 2025-06-08

## 2025-06-01 RX ORDER — SODIUM PHOSPHATE, DIBASIC AND SODIUM PHOSPHATE, MONOBASIC 7; 19 G/230ML; G/230ML
1 ENEMA RECTAL ONCE AS NEEDED
Status: DISCONTINUED | OUTPATIENT
Start: 2025-06-01 | End: 2025-06-08

## 2025-06-01 RX ORDER — TAMSULOSIN HYDROCHLORIDE 0.4 MG/1
0.4 CAPSULE ORAL EVERY EVENING
Status: DISCONTINUED | OUTPATIENT
Start: 2025-06-01 | End: 2025-06-08

## 2025-06-01 NOTE — ED PROVIDER NOTES
Patient Seen in: [facility] Emergency Department        History  Chief Complaint   Patient presents with    Difficulty Breathing     Stated Complaint: jeff    Subjective:   HPI            89-year-old male presents today for evaluation.  History is limited as patient is hard of hearing and has difficulty elaborating on his symptoms.  Patient reports cough,  shortness of breath and a chest pain that is somewhat ongoing for the last 3 days.  He fell today, leading to him hitting his head on the bed.  He initially denied syncope however then says he thinks he passed out.  He ultimately is unsure how he ended up on the ground.  He does not have any fevers or leg swelling.      Objective:     Past Medical History:    Atherosclerosis of coronary artery    Stents 3    BLOOD CLOTS    Colitis    Deaf    Heart attack (HCC)    NSTEMI    High blood pressure    High cholesterol    Kidney stone    Pulmonary embolism (HCC)    DVT & PE POst  Fractured Metatarsil    Stroke (HCC)              Past Surgical History:   Procedure Laterality Date    Angiogram      Angioplasty (coronary)  9/21/16      Stents LAD, CIRC, RCA    Cath cartotid stent      Cath drug eluting stent      Prostate biopsies                  Social History     Socioeconomic History    Marital status:    Tobacco Use    Smoking status: Former     Types: Cigarettes   Vaping Use    Vaping status: Never Used   Substance and Sexual Activity    Alcohol use: Not Currently     Comment: occ    Drug use: No     Social Drivers of Health     Food Insecurity: No Food Insecurity (6/1/2025)    NCSS - Food Insecurity     Worried About Running Out of Food in the Last Year: No     Ran Out of Food in the Last Year: No   Transportation Needs: No Transportation Needs (6/1/2025)    NCSS - Transportation     Lack of Transportation: No   Housing Stability: Not At Risk (6/1/2025)    NCSS - Housing/Utilities     Has Housing: Yes     Worried About Losing Housing: No     Unable to  Get Utilities: No                                Physical Exam    ED Triage Vitals   BP 06/01/25 1733 117/86   Pulse 06/01/25 1733 67   Resp 06/01/25 1733 18   Temp 06/01/25 1738 98.1 °F (36.7 °C)   Temp src 06/01/25 1738 Oral   SpO2 06/01/25 1729 91 %   O2 Device 06/01/25 1729 None (Room air)       Current Vitals:   Vital Signs  BP: 120/58  Pulse: 72  Resp: 18  Temp: 97.9 °F (36.6 °C)  Temp src: Oral  MAP (mmHg): 76    Oxygen Therapy  SpO2: 94 %  O2 Device: Nasal cannula  O2 Flow Rate (L/min): 2 L/min  Pulse Oximetry Type: Continuous            Physical Exam  Vitals and nursing note reviewed.   Constitutional:       Appearance: Normal appearance.   HENT:      Head: Normocephalic.      Nose: Nose normal.      Mouth/Throat:      Mouth: Mucous membranes are moist.   Eyes:      Extraocular Movements: Extraocular movements intact.   Cardiovascular:      Rate and Rhythm: Normal rate and regular rhythm.   Pulmonary:      Effort: Pulmonary effort is normal.      Breath sounds: Wheezing present.   Abdominal:      General: Abdomen is flat.   Musculoskeletal:         General: Normal range of motion.   Skin:     General: Skin is warm.   Neurological:      General: No focal deficit present.      Mental Status: He is alert.      Cranial Nerves: No cranial nerve deficit.      Sensory: No sensory deficit.      Motor: No weakness.   Psychiatric:         Mood and Affect: Mood normal.             ED Course  Labs Reviewed   CBC WITH DIFFERENTIAL WITH PLATELET - Abnormal; Notable for the following components:       Result Value    WBC 12.4 (*)     HCT 38.5 (*)     Neutrophil Absolute Prelim 9.48 (*)     Neutrophil Absolute 9.48 (*)     Monocyte Absolute 1.05 (*)     All other components within normal limits   COMP METABOLIC PANEL (14) - Abnormal; Notable for the following components:    Glucose 120 (*)     Sodium 131 (*)     Chloride 96 (*)     Calculated Osmolality 274 (*)     All other components within normal limits   PRO BETA  NATRIURETIC PEPTIDE - Abnormal; Notable for the following components:    Pro-Beta Natriuretic Peptide 5,393 (*)     All other components within normal limits   PROTHROMBIN TIME (PT) - Abnormal; Notable for the following components:    PT 15.8 (*)     INR 1.25 (*)     All other components within normal limits   TROPONIN I HIGH SENSITIVITY - Normal   PTT, ACTIVATED - Normal   LACTIC ACID, PLASMA - Normal   RESPIRATORY FLU EXPAND PANEL + COVID-19 - Normal    Narrative:     This test is intended for the simultaneous qualitative detection and differentiation of nucleic acids from multiple viral and bacterial respiratory organisms, including nucleic acid from Severe Acute Respiratory Syndrome Coronavirus 2 (SARS-CoV-2) in nasopharyngeal swab from individuals suspected of respiratory viral infection consistent with COVID-19 by their healthcare provider.    Test performed using the X[employer]ice Respiratory Panel 2.1 (RP2.1) assay on the [employer] System, [employer], [address], [place] [address].    This test is being used under the Food and Drug Administration's Emergency Use Authorization.    The authorized Fact Sheet for Healthcare Providers for this assay is available upon request from the laboratory.    SARS and MERS coronaviruses are not tested on this assay.   ED/MRSA SCREEN BY PCR-CC - Normal   LEGIONELLA URINE AG SEROGRP 1   COMP METABOLIC PANEL (14)   MAGNESIUM   PHOSPHORUS   CBC, PLATELET; NO DIFFERENTIAL   RAINBOW DRAW LAVENDER   RAINBOW DRAW LIGHT GREEN   RAINBOW DRAW BLUE   BLOOD CULTURE   BLOOD CULTURE     EKG    Rate, intervals and axes as noted on EKG Report.  Rate: 65  Rhythm: Sinus Rhythm  Reading: No STEMI              CT BRAIN OR HEAD (CPT=70450)  Result Date: 6/1/2025  PROCEDURE:  CT BRAIN OR HEAD (31289)  COMPARISON:  [facility], CT, CT BRAIN OR HEAD (61420), 6/03/2024, 11:02 PM.  INDICATIONS:  jeff  TECHNIQUE:  Noncontrast CT scanning is performed through the brain. Dose reduction techniques  were used. Dose information is transmitted to the ACR (American College of Radiology) NRDR (National Radiology Data Registry) which includes the Dose Index Registry.  PATIENT STATED HISTORY: (As transcribed by Technologist)  JEFF.    FINDINGS:  VENTRICLES/SULCI:  Diffuse cerebral and cerebellar atrophy.  Stable calcified meningioma along the midline falx measuring 9 x 5 mm. INTRACRANIAL:  No acute intracranial hemorrhage.  There is encephalomalacia involving the left fronto parietal and posterior temporal lobe in the distribution of the left MCA consistent with old infarction.  There is no midline shift.  No mass effect.  There are bilateral chronic basal ganglia lacunar infarctions.  Patchy decreased attenuation in the periventricular white matter of both cerebral hemispheres which is nonspecific although compatible with chronic microvascular ischemic changes of aging.  SINUSES:           No sign of acute sinusitis.  MASTOIDS:          No sign of acute inflammation. SKULL:             No depressed calvarial fracture.            CONCLUSION:  No CT evidence for acute intracranial hemorrhage or mass effect.   Diffuse cerebral and cerebellar atrophy with chronic microvascular ischemic changes of aging and old left MCA distribution infarction.    LOCATION:  [facility]   Dictated by (CST): Rosy Hinds MD on 6/01/2025 at 8:20 PM     Finalized by (CST): Rosy Hinds MD on 6/01/2025 at 8:22 PM       XR CHEST AP PORTABLE  (CPT=71045)  Result Date: 6/1/2025  PROCEDURE:  XR CHEST AP PORTABLE  (CPT=71045)  TECHNIQUE:  AP chest radiograph was obtained.  COMPARISON:  [facility], XR, XR CHEST AP PORTABLE  (CPT=71045), 2/15/2025, 9:07 PM.  [facility] , XR, XR CHEST AP PORTABLE  (CPT=71045), 2/17/2025, 9:34 AM.  INDICATIONS:  jeff  PATIENT STATED HISTORY: (As transcribed by Technologist)  patient states recent shortness of breath and had a fall today.             CONCLUSION:  Diffuse bilateral pulmonary infiltrates with more focal  consolidation/atelectasis retrocardiac left lung base.  Bilateral pleural effusions.  Stable heart size..   LOCATION:  [facility]      Dictated by (CST): Rosy Hinds MD on 6/01/2025 at 8:12 PM     Finalized by (CST): Rosy Hinds MD on 6/01/2025 at 8:12 PM                         [facility]     Differential Diagnosis  89-year-old male presents today for evaluation of several days of shortness of breath and cough.  He may have suffered a syncopal episode today.  EMS was called and he was noted to be hypoxic on arrival.  He is satting well on nasal cannula.  On auscultation, he has diffuse wheezing.  Patient is reported to be anticoagulated on Eliquis, plan for CT of the head to assess for intracranial hemorrhage.  Differential includes bronchitis, pneumonia, pulmonary edema.  Plan for x-ray along with labs.    8:44 pm  X-ray with findings concerning for pneumonia.  His proBNP is elevated so there could be a component of CHF as well.  IV antibiotics ordered.  Patient had a transient low blood pressure in the ED which responded to fluids.  Will admit to the hospital for further care, I spoke with the hospitalist in regards to admission.    I spoke with patient's daughter, patient had been recently taken off the Eliquis.    Discussions of Management  I spoke with the hospitalist in regards to admission    Independent Interpretation  I independently interpreted x-ray, bilateral infiltrate noted    Admission disposition: 6/1/2025  8:45 PM           Medical Decision Making      Disposition and Plan     Clinical Impression:  1. Acute hypoxemic respiratory failure (HCC)    2. Acute bronchospasm    3. Community acquired pneumonia, unspecified laterality    4. Acute on chronic congestive heart failure, unspecified heart failure type (HCC)         Disposition:  Admit  6/1/2025  8:45 pm    Follow-up:  No follow-up provider specified.        Medications Prescribed:  Current Discharge Medication List                Supplementary  Documentation:         Hospital Problems       Present on Admission  Date Reviewed: 4/9/2024          ICD-10-CM Noted POA    * (Principal) Acute hypoxemic respiratory failure (HCC) J96.01 6/1/2025 Unknown    Acute bronchospasm J98.01 6/1/2025 Unknown    Acute on chronic congestive heart failure, unspecified heart failure type (HCC) I50.9 6/1/2025 Unknown    Community acquired pneumonia, unspecified laterality J18.9 6/1/2025 Unknown

## 2025-06-01 NOTE — ED INITIAL ASSESSMENT (HPI)
Pt arrives via EMS from home  Reports cough since Friday, was prescribed medications.  Reports SOB, syncope and fall this morning at 5am.   Per EMS, patient was 88% RA at the scene. Placed pt on 4L NC. Duoneb given in route to ER.  On eliquis.   On arrival to ER, pt is 91% RA, placed patient in 4L NC. Audible wheezing noted. Pt is aaox2.

## 2025-06-02 ENCOUNTER — APPOINTMENT (OUTPATIENT)
Dept: CV DIAGNOSTICS | Facility: HOSPITAL | Age: OVER 89
End: 2025-06-02
Attending: INTERNAL MEDICINE
Payer: MEDICARE

## 2025-06-02 LAB
ADENOVIRUS PCR:: NOT DETECTED
ALBUMIN SERPL-MCNC: 3.5 G/DL (ref 3.2–4.8)
ALBUMIN/GLOB SERPL: 1.3 {RATIO} (ref 1–2)
ALP LIVER SERPL-CCNC: 82 U/L (ref 45–117)
ALT SERPL-CCNC: 29 U/L (ref 10–49)
ANION GAP SERPL CALC-SCNC: 11 MMOL/L (ref 0–18)
AST SERPL-CCNC: 28 U/L (ref ?–34)
ATRIAL RATE: 65 BPM
B PARAPERT DNA SPEC QL NAA+PROBE: NOT DETECTED
B PERT DNA SPEC QL NAA+PROBE: NOT DETECTED
BILIRUB SERPL-MCNC: 0.6 MG/DL (ref 0.2–0.9)
BUN BLD-MCNC: 12 MG/DL (ref 9–23)
C PNEUM DNA SPEC QL NAA+PROBE: NOT DETECTED
CALCIUM BLD-MCNC: 8.5 MG/DL (ref 8.7–10.6)
CHLORIDE SERPL-SCNC: 100 MMOL/L (ref 98–112)
CO2 SERPL-SCNC: 23 MMOL/L (ref 21–32)
CORONAVIRUS 229E PCR:: NOT DETECTED
CORONAVIRUS HKU1 PCR:: NOT DETECTED
CORONAVIRUS NL63 PCR:: NOT DETECTED
CORONAVIRUS OC43 PCR:: NOT DETECTED
CREAT BLD-MCNC: 0.7 MG/DL (ref 0.7–1.3)
EGFRCR SERPLBLD CKD-EPI 2021: 83 ML/MIN/1.73M2 (ref 60–?)
ERYTHROCYTE [DISTWIDTH] IN BLOOD BY AUTOMATED COUNT: 16.1 %
FLUAV RNA SPEC QL NAA+PROBE: NOT DETECTED
FLUBV RNA SPEC QL NAA+PROBE: NOT DETECTED
GLOBULIN PLAS-MCNC: 2.7 G/DL (ref 2–3.5)
GLUCOSE BLD-MCNC: 121 MG/DL (ref 70–99)
HCT VFR BLD AUTO: 35.9 % (ref 39–53)
HGB BLD-MCNC: 12.1 G/DL (ref 13–17.5)
L PNEUMO AG UR QL: NEGATIVE
MAGNESIUM SERPL-MCNC: 2.2 MG/DL (ref 1.6–2.6)
MCH RBC QN AUTO: 27.9 PG (ref 26–34)
MCHC RBC AUTO-ENTMCNC: 33.7 G/DL (ref 31–37)
MCV RBC AUTO: 82.9 FL (ref 80–100)
METAPNEUMOVIRUS PCR:: NOT DETECTED
MRSA DNA SPEC QL NAA+PROBE: NEGATIVE
MYCOPLASMA PNEUMONIA PCR:: NOT DETECTED
OSMOLALITY SERPL CALC.SUM OF ELEC: 279 MOSM/KG (ref 275–295)
P AXIS: 59 DEGREES
P-R INTERVAL: 112 MS
PARAINFLUENZA 1 PCR:: NOT DETECTED
PARAINFLUENZA 2 PCR:: NOT DETECTED
PARAINFLUENZA 3 PCR:: NOT DETECTED
PARAINFLUENZA 4 PCR:: NOT DETECTED
PHOSPHATE SERPL-MCNC: 3.4 MG/DL (ref 2.4–5.1)
PLATELET # BLD AUTO: 371 10(3)UL (ref 150–450)
POTASSIUM SERPL-SCNC: 4.7 MMOL/L (ref 3.5–5.1)
PROCALCITONIN SERPL-MCNC: 0.13 NG/ML (ref ?–0.05)
PROT SERPL-MCNC: 6.2 G/DL (ref 5.7–8.2)
Q-T INTERVAL: 470 MS
QRS DURATION: 134 MS
QTC CALCULATION (BEZET): 488 MS
R AXIS: 132 DEGREES
RBC # BLD AUTO: 4.33 X10(6)UL (ref 3.8–5.8)
RHINOVIRUS/ENTERO PCR:: NOT DETECTED
RSV RNA SPEC QL NAA+PROBE: NOT DETECTED
SARS-COV-2 RNA NPH QL NAA+NON-PROBE: NOT DETECTED
SODIUM SERPL-SCNC: 134 MMOL/L (ref 136–145)
T AXIS: 31 DEGREES
VENTRICULAR RATE: 65 BPM
WBC # BLD AUTO: 11.6 X10(3) UL (ref 4–11)

## 2025-06-02 PROCEDURE — 99233 SBSQ HOSP IP/OBS HIGH 50: CPT | Performed by: HOSPITALIST

## 2025-06-02 PROCEDURE — 93306 TTE W/DOPPLER COMPLETE: CPT | Performed by: INTERNAL MEDICINE

## 2025-06-02 RX ORDER — BUMETANIDE 0.25 MG/ML
1 INJECTION, SOLUTION INTRAMUSCULAR; INTRAVENOUS
Status: DISCONTINUED | OUTPATIENT
Start: 2025-06-02 | End: 2025-06-03

## 2025-06-02 NOTE — PLAN OF CARE
A&Ox4, [place]. VSS. Afebrile. Spo2>90% on 4L. Tele-SB. Lovenox. Cardiac diet. Voids w/ urinal. IV abx. Up Rwx1. Patient denies n/v/d, pain or SOB. Echo completed today, awaiting results. Patient and family updated on POC. All questions answered at this time. Call light within reach.       Problem: Patient/Family Goals  Goal: Patient/Family Long Term Goal  Description: Patient's Long Term Goal: discharge to home    Interventions:  - Follow POC  - See additional Care Plan goals for specific interventions  Outcome: Progressing     Problem: Patient/Family Goals  Goal: Patient/Family Short Term Goal  Description: Patient's Short Term Goal: 6/1NOC:rest,safety    Interventions:   - cluster care  - See additional Care Plan goals for specific interventions  Outcome: Progressing

## 2025-06-02 NOTE — DISCHARGE INSTRUCTIONS
Sometimes managing your health at home requires assistance.  The [facility] team has recognized your preference to use [facility].  They can be reached by phone at [phone].  The fax number for your reference is [phone].. A representative from the home health agency will contact you or your family to schedule your first visit.

## 2025-06-02 NOTE — CM/SW NOTE
Department  notified of request for HHC, aidin referrals started. Assigned CM/SW to follow up with pt/family on further discharge planning.     Corrine Kirkpatrick, DSC

## 2025-06-02 NOTE — PROGRESS NOTES
[facility]   part of [facility]     Hospitalist Progress Note     Beba Mijares Patient Status:  Inpatient    1926 MRN [id number]   Location [facility] 5NW-A Attending Trevor Oden MD   Hosp Day # 1 PCP CLARK HIGGINS     Chief Complaint: Syncope    Subjective:   Patient admits to cough, dry. Shortness of breath. No chest pain. On oxygen.     Current medications:   bumetanide  1 mg Intravenous BID (Diuretic)    ampicillin-sulbactam  3 g Intravenous q6h    amiodarone  200 mg Oral QPM    aspirin  81 mg Oral Daily    pantoprazole  40 mg Oral BID AC    rosuvastatin  10 mg Oral Daily    tamsulosin  0.4 mg Oral QPM    doxycycline  100 mg Oral BID    enoxaparin  40 mg Subcutaneous Daily       Objective:    Review of Systems:   10 point ROS completed and was negative, except for pertinent positive and negatives stated in subjective.    Vital signs:  Temp:  [97.9 °F (36.6 °C)-99.3 °F (37.4 °C)] 99.3 °F (37.4 °C)  Pulse:  [57-85] 71  Resp:  [14-24] 20  BP: ()/(56-86) 127/60  SpO2:  [85 %-100 %] 93 %  Patient Weight for the past 72 hrs:   Weight   25 1733 140 lb (63.5 kg)   25 2133 150 lb 8 oz (68.3 kg)   25 2141 150 lb (68 kg)     Physical Exam:    General: No acute distress.   Respiratory: Crackles bilaterally   Cardiovascular: S1, S2.   Abdomen: Soft, nontender, nondistended.  Positive bowel sounds.  Extremities: + edema.  Neuro: Alert and awake    Diagnostic Data:    Labs:  Recent Labs   Lab 250 25  0750   WBC 12.4* 11.6*   HGB 13.1 12.1*   MCV 83.5 82.9   .0 371.0   INR 1.25*  --        Recent Labs   Lab 25  1740 25  0750   * 121*   BUN 16 12   CREATSERUM 0.72 0.70   CA 8.7 8.5*   ALB 3.6 3.5   * 134*   K 4.7 4.7   CL 96* 100   CO2 28.0 23.0   ALKPHO 87 82   AST 30 28   ALT 30 29   BILT 0.6 0.6   TP 6.5 6.2       Estimated Creatinine Clearance: 53.2 mL/min (based on SCr of 0.7 mg/dL).    Recent Labs   Lab 25  1740   PTP  15.8*   INR 1.25*            COVID-19 Lab Results    COVID-19  Lab Results   Component Value Date    COVID19 Not Detected 06/01/2025    COVID19 Detected (A) 02/18/2025       Pro-Calcitonin  Recent Labs   Lab 06/01/25  1740   PCT 0.13*       Cardiac  Recent Labs   Lab 06/01/25  1740   PBNP 5,393*       Creatinine Kinase  No results for input(s): \"CK\" in the last 168 hours.    Inflammatory Markers  No results for input(s): \"CRP\", \"TOSHA\", \"LDH\", \"DDIMER\" in the last 168 hours.    Recent Labs   Lab 06/01/25  1740   TROPHS 23       Imaging: Imaging data reviewed in Epic.    Medications:    bumetanide  1 mg Intravenous BID (Diuretic)    ampicillin-sulbactam  3 g Intravenous q6h    amiodarone  200 mg Oral QPM    aspirin  81 mg Oral Daily    pantoprazole  40 mg Oral BID AC    rosuvastatin  10 mg Oral Daily    tamsulosin  0.4 mg Oral QPM    doxycycline  100 mg Oral BID    enoxaparin  40 mg Subcutaneous Daily       Assessment & Plan:    Syncope, CTb neg, concern for arrhythmia given depressed EF  ECHO   Orthostatics > negative   Telemetry   Acute hypoxic respiratory failure d/t possible pneumonia though suspect more acute on chronic heart failure, RVP neg  IV abx  IV diuretics   Oxygen, wean as able   Check PCT   Acute on chronic systolic heart failure - only taking Bumex as needed  Start Bumex IV  ECHO  Daily weight  Monitor I/O  Cardiology consult   Atrial fibrillation, not on anticoagulation  Amiodarone  Telemetry   Hypotension on arrival, resolved   CAD sp PCI  Essential hypertension  Dyslipidemia  Aspirin  Crestor  Lisinopril on hold   Monitor hemodynamics   BPH  Flomax  Leukocytosis,improving    Hyponatremia, improving  Hypercoagulable state, history of post-op DVT/PE off Eliquis  Cerebrovascular disease   Carotid stenosis sp stent  Hearing impairment     DVT Px: Lovenox     At this point Mr. Mijares is expected to be discharge to: TBD, PT evaluation     Plan of care discussed with patient, RN and cardiology APN.      Trevor Oden MD        Supplementary Documentation:   DVT Mechanical Prophylaxis:   SCDs, Early ambuation  DVT Pharmacologic Prophylaxis   Medication    enoxaparin (Lovenox) 40 MG/0.4ML SUBQ injection 40 mg      DVT Pharmacologic prophylaxis: Aspirin 162 mg         Code Status: Prior  Jennings: No urinary catheter in place  Jennings Duration (in days):   Central line:    ELIN:         **Certification      PHYSICIAN Certification of Need for Inpatient Hospitalization - Initial Certification    Patient will require inpatient services that will reasonably be expected to span two midnight's based on the clinical documentation in H+P.   Based on patients current state of illness, I anticipate that, after discharge, patient will require TBD.

## 2025-06-02 NOTE — IMAGING NOTE
[facility] Echo 3/2025  1. The left ventricle is normal in size and wall thickness is within normal limits. Global left ventricular systolic function is moderate-severely  decreased. The left ventricular ejection fraction is 30-35%. Left ventricular diastolic function is impaired (Grade I) with normal left atrial pressure. Hypokenesis of the inferoseptal and anteroseptal walls and the apex. Lumason was used to enhance endocardial borders.   2. The right ventricle is normal in size. Right ventricular systolic function is normal.  3. Pulmonary artery pressure not measured due to insignificant tricuspid regurgitation.  4. The mitral valve leaflets appear mildly thickened. Mild mitral annular calcification is noted.   5. Dilatation of the aortic root and ascending aorta is noted. Aortic root diameter is 4.0 cms. Ascending aorta diameter is 4.5 cms.   6. The aortic valve appears moderately calcified with thickened leaflets and reduced cuspal excursion. Cannot exclude mild low-flow aortic stenosis. The trans-aortic peak velocity is 1.7 m/s. The trans-aortic mean gradient is 6.0 mmHg. DI = 0.51. The aortic valve area planimetry is 1.53 cm².  Mild (1+) aortic regurgitation.  7. The study quality is average.            [facility] monitor 3/7/2025     1. This is an excellent quality study.   2. Predominant rhythm is normal sinus rhythm.   3. The minimum heart rate recorded was 34 beats / minute (3/13/2025). The maximum heart rate is 132 beats / minute (3/8/2025). The mean heart rate is 58 beats / minute.   4. No evidence of AV block is noted.   5. Rare atrial runs, less than 5s. Afib burden: 0%  6. No evidence of ventricular arrhythmia is noted.  7. No pauses were noted.

## 2025-06-02 NOTE — ED QUICK NOTES
Orders for admission, patient is aware of plan and ready to go upstairs. Any questions, please call ED RN Lyla at extension [phone].     Patient Covid vaccination status: Fully vaccinated     COVID Test Ordered in ED: None    COVID Suspicion at Admission: N/A    Running Infusions: Medication Infusions[1]     Mental Status/LOC at time of transport: AO x 4    Other pertinent information:   CIWA score: N/A   NIH score:  N/A             [1]

## 2025-06-02 NOTE — ED QUICK NOTES
Rounding Completed. Report received from SHANTA Bella    Plan of Care reviewed. Waiting for CT/in patient bed.  Elimination needs assessed.  Provided updates.    Bed is locked and in lowest position. Call light within reach.

## 2025-06-02 NOTE — ED QUICK NOTES
Pt and family informed of room number.     Pt placed on 2L oxygen via nasal cannula due to oxygen dropping to 87% on RA.

## 2025-06-02 NOTE — ED QUICK NOTES
Report given to Lyla WOMACK  1hr long neb treatment complete. Pt is 95% RA spo2.  Audible wheezing still noted but improved. Patient made ready for CT. Fluids infusing. Vitals improved. BP now 90/56.

## 2025-06-02 NOTE — CONSULTS
Cardiology Consultation    Beba Mijares Patient Status:  Inpatient    1926 MRN [id number]   Location [facility] 5NW-A Attending Trevor Oden MD   Hosp Day # 1 PCP CLARK HIGGINS     Reason for Consultation:  fall      History of Present Illness:  Beba Mijares is a a(n) 89 year old male. [place], history from chart. Elderly esthela follows with Dr. Dennis.  CAD, previous PCI, PAF, not on a/c due to age.  Echo in 2025 showed newly decreased EF to 35%, managed medically due to his age.  Admitted with weakness and fall.  Cough for a few days.  Details of fall unclear.  CT brain negative for acute issues.  Hypotensive on admission, responded to fluids.  CXR with diffuse infiltrates with elevated pBNP.  On 2 L NC g comfortably.  Troponin negative.    History:  Past Medical History[1]  Past Surgical History[2]  Family History[3]      Allergies:  Allergies[4]    Medications:  Scheduled Medications[5]    Continuous Infusions:  Medication Infusions[6]    Social History:   reports that he has quit smoking. His smoking use included cigarettes. He does not have any smokeless tobacco history on file. He reports that he does not currently use alcohol. He reports that he does not use drugs.    Review of Systems:  All systems were reviewed and are negative except as described above in HPI.    Physical Exam:      Temp:  [97.9 °F (36.6 °C)-99.3 °F (37.4 °C)] 99.3 °F (37.4 °C)  Pulse:  [57-85] 71  Resp:  [14-24] 20  BP: ()/(56-86) 127/60  SpO2:  [85 %-100 %] 93 %    Last 3 Weights   25 2141 150 lb (68 kg)   25 2133 150 lb 8 oz (68.3 kg)   25 1733 140 lb (63.5 kg)   25 0644 140 lb (63.5 kg)   25 0442 140 lb 3.2 oz (63.6 kg)   02/15/25 1850 100 lb (45.4 kg)   24 0221 142 lb 3.2 oz (64.5 kg)   24 2218 140 lb (63.5 kg)           General: No apparent distress  HEENT: No focal deficits.  Neck: supple. JVP normal  Cardiac: Regular rhythm, S1, S2 normal,   No rub or gallop.   AoEM  Lungs: CTA  Abdomen: Soft, non-tender.   Extremities: No edema  Neurologic: no focal deficits  Skin: Warm and dry.          Telemetry: sinus    Laboratories and Data:  Diagnostics:    EKG, 6/2/2025:  sinus, RBBB    CXR, 6/2/2025:  reviewed    Labs:   HEM:  Recent Labs   Lab 06/01/25 1740 06/02/25  0750   WBC 12.4* 11.6*   HGB 13.1 12.1*   .0 371.0       Chem:  Recent Labs   Lab 06/01/25 1740 06/02/25  0750   * 134*   K 4.7 4.7   CL 96* 100   CO2 28.0 23.0   BUN 16 12   CREATSERUM 0.72 0.70   CA 8.7 8.5*   MG  --  2.2   PHOS  --  3.4   * 121*       Recent Labs   Lab 06/01/25 1740 06/02/25  0750   ALT 30 29   AST 30 28   ALB 3.6 3.5       Recent Labs   Lab 06/01/25  1740   PTP 15.8*   INR 1.25*       No results for input(s): \"TROP\", \"CK\" in the last 168 hours.      Impression:   S/p fall, possible syncope - hypotensive on admission, certainly may be orthostatic.  Acute hypoxic resp failure - CXR and pBNP suggests acute HFrEF.  HFrEF - new EF decline to 35% on echo March 2025.  Possible pneumonia, on Abx.  CAD, previous PCI  Prior CVA  Advanced age.    Plan:  Agree with IV diuresis.  Continue amiodarone.  Ptx, goals of care discussions.  No plans for ischemic cardiac w/u.  Follow on tele.  Stop lisinopril and isosorbide with low BP being more of a risk for him.    Keanu Abreu MD  6/2/2025  10:45 AM  C5         [1]   Past Medical History:   Atherosclerosis of coronary artery    Stents 3    BLOOD CLOTS    Colitis    Deaf    Heart attack (HCC)    NSTEMI    High blood pressure    High cholesterol    Kidney stone    Pulmonary embolism (HCC)    DVT & PE POst  Fractured Metatarsil    Stroke (HCC)   [2]   Past Surgical History:  Procedure Laterality Date    Angiogram      Angioplasty (coronary)  9/21/16      Stents LAD, CIRC, RCA    Cath cartotid stent      Cath drug eluting stent      Prostate biopsies     [3]   Family History  Problem Relation Age of Onset    Hypertension Sister      Cancer Brother    [4] No Known Allergies  [5]    bumetanide  1 mg Intravenous BID (Diuretic)    ampicillin-sulbactam  3 g Intravenous q6h    amiodarone  200 mg Oral QPM    aspirin  81 mg Oral Daily    pantoprazole  40 mg Oral BID AC    rosuvastatin  10 mg Oral Daily    tamsulosin  0.4 mg Oral QPM    doxycycline  100 mg Oral BID    enoxaparin  40 mg Subcutaneous Daily   [6]

## 2025-06-02 NOTE — PROGRESS NOTES
NURSING ADMISSION NOTE      Patient admitted via Cart  Oriented to room 525  Safety precautions initiated.  Bed in low position.  Call light in reach.

## 2025-06-02 NOTE — HISTORICAL OFFICE NOTE
[facility]  [address] [place] [address], [address] [place] [address]  [phone]      Beba Mijares  Progress Note  Demographics:  Name: Beba Mijares YOB: 1926  Age: 89, Male Medical Record No: [id number]  Visited Date/Time: 04/15/2025 11:10 AM    Chief Complaints  Follow up  History of Present Illness  Patient is here for follow-up visit.  He has no complaints.  Cardiac risk factors Never smoked  Past Medical History  1.PAF (paroxysmal atrial fibrillation)  2.Coronary artery disease  3.S/P drug eluting coronary stent placement  4.Benign essential HTN  5.Atherosclerosis of native coronary artery of native heart without angina pectoris  Family History  Family history unknown.  Social History  Smoking status Never smoked  Tobacco usage - No (Non-smoker for personal reasons (finding))  Review of systems  Constitutional No history of Weight Loss  Eyes No history of Blurry vision  ENT No history of Bloody nose (epistaxis)  Gastrointestinal No history of Abdominal pain  Cardiovascular No history of Chest pain, DOWNS, Palpitations, Syncope, PND, Orthopnea, Edema and Claudication  Genitourinary No history of Dysuria  Musculoskeletal No history of Myalgias  Respiratory No history of SOB  Neurological No history of Migraines  Endocrine No history of Polyuria  Hem/Lymphatic No history of Easy bruising  Integumentary No history of Rashes  Physical Examination  Vitals Right Arm Sitting  / 60 mmHg, Pulse rate 57 bpm, Height in 5' 5\", BMI: 23.5, Weight in 141 lbs (or) 63.96 kgs and BSA : 1.72 cc/m²  General Appearance No Acute Distress  Cardiovascular   EKG/Other abnormalities  HEENT: EOMI, mucosa moist  Lungs: clear  CV: RRR  Abd: soft  Ext: Trace edema  Neuro: A&O  Allergies  No medication allergies noted.  Medications  1.amiodarone 200 mg tablet, Take 1 tablet orally once a day.  2.aspirin 81 MG tablet, Take 81 mg by mouth daily.  3.bumetanide 0.5 mg tablet, Take 1 tablet orally once a day as  needed for weight gain or swelling. Call our office if weight gain of more than 5 lbs  4.isosorbide mononitrate ER 30 mg tablet,extended release 24 hr, Take one-half tablet orally once a day.  5.nitroGLYcerin (NITROSTAT) 0.4 MG sublingual tablet, Place 1 tablet under the tongue every 5 minutes as needed for Chest pain.  6.nitroglycerin 0.4 mg sublingual tablet, Take 1 tablet (sublingual) as needed for chest pain  7.pantoprazole (PROTONIX) 40 MG tablet, Take 40 mg by mouth 2 times daily.  8.potassium chloride ER 10 mEq tablet,extended release, Take 1 tablet orally once a day as needed.  9.rosuvastatin 10 mg tablet, Take 1 tablet orally once a day.  10.tamsulosin 0.4 mg capsule, Take 1 capsule orally once a day.  Impression  1.Bradycardia  2.Syncope and collapse  3.S/P drug eluting coronary stent placement  4.Benign essential HTN  5.Atherosclerosis of native coronary artery of native heart without angina pectoris  Assessment & Plan  Patient had an echo after his last visit that revealed an EF of 35%.  This is clearly a diminishment since his last echo last year which revealed normal LV function.  Etiology is unclear.  At age 89 I think this should be managed medically.  He had an episode of A-fib when he had COVID.  He had a monitor that revealed no evidence of COVID.  At this point, I think we should continue with aspirin.  I think the risk and benefit do not justify the use of DOAC.    Will continue to manage him conservatively.  He apparently needs dilation of his esophagus due to difficulty swallowing.  Obviously his risk is going to be elevated but acceptable given the circumstances.    I will see him in 4 months.  He will call with questions or concerns.  Future appointments  1.Follow up visit - Dusty Dennis MD (4 Months)  Miscellaneous  1.Weight monitoring (regime/therapy)  2.Reviewed trans thoracic echocardiogram with the patient.  Nurses documentation  Refills : None   Upcoming surgeries: None   Assistive  devices: None   Verbal EKG order: None   Triage and medications reviewed by: BIJAL BO  Patient instructions  Follow up with Dr. Dennis in 4-6 months   Diagnostics Details  Trans Thoracic Echocardiogram 03/07/2025  1.The left ventricle is normal in size and wall thickness is within normal limits. Global left ventricular systolic function is moderate-severely decreased. The left ventricular ejection fraction is 30-35%. Left ventricular diastolic function is impaired (Grade I) with normal left atrial pressure. Hypokenesis of the inferoseptal and anteroseptal walls and the apex. Lumason was used to enhance endocardial borders.    2.The right ventricle is normal in size. Right ventricular systolic function is normal.    3.Pulmonary artery pressure not measured due to insignificant tricuspid regurgitation.    4.The mitral valve leaflets appear mildly thickened. Mild mitral annular calcification is noted.    5.Dilatation of the aortic root and ascending aorta is noted. Aortic root diameter is 4.0 cms. Ascending aorta diameter is 4.5 cms.    6.The aortic valve appears moderately calcified with thickened leaflets and reduced cuspal excursion. Cannot exclude mild low-flow aortic stenosis. The trans-aortic peak velocity is 1.7 m/s. The trans-aortic mean gradient is 6.0 mmHg. DI = 0.51. The aortic valve area planimetry is 1.53 cm². Mild (1+) aortic regurgitation.    7.The study quality is average.    Holter Monitoring 03/07/2025  1.This is an excellent quality study.    2.Predominant rhythm is normal sinus rhythm.    3.The minimum heart rate recorded was 34 beats / minute (3/13/2025). The maximum heart rate is 132 beats / minute (3/8/2025). The mean heart rate is 58 beats / minute.    4.No evidence of AV block is noted.    5.Rare atrial runs, less than 5s. Afib burden: 0%    6.No evidence of ventricular arrhythmia is noted.    7.No pauses were noted.    Care Providers: Dusty Dennis MD, Jessie Carreno and Susanna Roy  CCMA  Electronically Authenticated by  Dusty Dennis MD  04/16/2025 07:56:51 AM  Disclaimer: Components of this note were documented using voice recognition system and are subject to errors not corrected at proofreading. Contact the author of this note for any clarifications.

## 2025-06-02 NOTE — SLP NOTE
ADULT SWALLOWING EVALUATION    ASSESSMENT    ASSESSMENT/OVERALL IMPRESSION:  Patient is a 89 y/o male admitted with SOB and syncope. PMHx significant for CAD, HTN, HLD, and CVA. SLP order received to evaluate oropharyngeal swallow. Patient known to this service for prior dysphagia assessment completed 2/18/25. Patient was restricted to pureed solids at this time d/t esophageal stricture, but was tolerating well without overt s/s of aspiration. Patient reported having esophageal dilation completed since that time and has been consuming a regular diet and thin liquids without difficulty.    Patient presented with a largely intact oropharyngeal swallow. Bolus acceptance was adequate without evidence of anterior bolus loss. Mastication and AP bolus transit were thorough and efficient without evidence of oral residue. Pharyngeal swallow initiation appeared timely and hyolaryngeal excursion was adequate per palpation.  No overt s/s of aspiration observed and patient denied odynophagia and globus sensation across consistencies.     Recommend patient continue a regular diet and thin liquids with general safe swallowing precautions. SLP will continue to follow to monitor diet tolerance and adjust as appropriate. Education provided re: results and recommendations.         RECOMMENDATIONS   Diet Recommendations - Solids: Regular  Diet Recommendations - Liquids: Thin Liquids                           Aspiration Precautions: Upright position, Small bites, Small sips  Medication Administration Recommendations: One pill at a time  Treatment Plan/Recommendations: Aspiration precautions    HISTORY   MEDICAL HISTORY  Reason for Referral: R/O aspiration    Problem List  Principal Problem:    Acute hypoxemic respiratory failure (HCC)  Active Problems:    Acute bronchospasm    Community acquired pneumonia, unspecified laterality    Acute on chronic congestive heart failure, unspecified heart failure type (HCC)      Past Medical  History  Past Medical History[1]       Diet Prior to Admission: Regular, Thin liquids  Precautions: Aspiration    Patient/Family Goals: none stated    SWALLOWING HISTORY  Current Diet Consistency: Regular, Thin liquids  Dysphagia History: as above  Imaging Results:   CXR 6/1/25  CONCLUSION:  Diffuse bilateral pulmonary infiltrates with more focal consolidation/atelectasis retrocardiac left lung base.      Bilateral pleural effusions.      Stable heart size..         LOCATION:  [facility]                  Dictated by (CST): Rosy Hinds MD on 6/01/2025 at 8:12 PM       Finalized by (CST): Rosy Hinds MD on 6/01/2025 at 8:12 PM     SUBJECTIVE       OBJECTIVE   ORAL MOTOR EXAMINATION  Dentition: Functional  Symmetry: Within Functional Limits  Strength: Within Functional Limits     Range of Motion: Within Functional Limits       Voice Quality: Clear  Respiratory Status: Nasal cannula  Consistencies Trialed: Thin liquids, Puree, Hard solid  Method of Presentation: Self presentation  Patient Positioned: Upright, Midline    Oral Phase of Swallow: Within Functional Limits                      Pharyngeal Phase of Swallow: Within Functional Limits           (Please note: Silent aspiration cannot be evaluated clinically. Videofluoroscopic Swallow Study is required to rule-out silent aspiration.)    Esophageal Phase of Swallow: No complaints consistent with possible esophageal involvement  Comments: NA              GOALS  Goal #1 The patient will tolerate regular consistency and thin liquids without overt signs or symptoms of aspiration with 90 % accuracy over 1-2 session(s).  In Progress   Goal #2 The patient/family/caregiver will demonstrate understanding and implementation of aspiration precautions and swallow strategies independently over 1-2 session(s).    In Progress   Goal #3     Goal #4     Goal #5     Goal #6     Goal #7     Goal #8       FOLLOW UP  Treatment Plan/Recommendations: Aspiration precautions  Duration: 1  week  Follow Up Needed (Documentation Required): Yes  SLP Follow-up Date: 06/03/25    Thank you for your referral.   If you have any questions, please contact BRANDIN Prasad       [1]   Past Medical History:   Atherosclerosis of coronary artery    Stents 3    BLOOD CLOTS    Colitis    Deaf    Heart attack (HCC)    NSTEMI    High blood pressure    High cholesterol    Kidney stone    Pulmonary embolism (HCC)    DVT & PE POst  Fractured Metatarsil    Stroke (HCC)

## 2025-06-02 NOTE — H&P
[facility]     Beba Mijares Patient Status:  Emergency    1926 MRN [id number]   Location [facility] EMERGENCY DEPARTMENT Attending Devendra Birch*   Hosp Day # 1 PCP CLARK HIGGINS     Chief Complaint: SOB, syncope    Subjective:    History of Present Illness:     Beba Mijares is a 89 year old male with CAD s/p PCI, HTN, HLD, prior provoked VTE, CVD with hx of CVA presented with cough, SOB and syncope.     Patient is deaf so history was obtained from multiple sources. Patient struggling with a cough x 3-4 days. He experienced a fall, possibly syncope today morning at 5 AM with head trauma. Later in the day he was experiencing SOB so family called paramedics.     Patient was afebrile, hypotensive to 80s systolic in the ED. HE was requiring 4 L NC. CT brain was negative. CXR with diffuse bilateral pulmonary infiltrates with more focal consolidation/atelectasis retrocardiac left lung base. Bilateral pleural effusions. He was given albuterol neb, IV antibiotics, 1 L NS bolus and admitted.     History/Other:    Past Medical History:  Past Medical History:    Atherosclerosis of coronary artery    Stents 3    BLOOD CLOTS    Colitis    Deaf    Heart attack (HCC)    NSTEMI    High blood pressure    High cholesterol    Kidney stone    Pulmonary embolism (HCC)    DVT & PE POst  Fractured Metatarsil    Stroke (HCC)     Past Surgical History:   Past Surgical History:   Procedure Laterality Date    Angiogram      Angioplasty (coronary)  16      Stents LAD, CIRC, RCA    Cath cartotid stent      Cath drug eluting stent      Prostate biopsies        Family History:   Family History[1]  Social History:    reports that he has quit smoking. His smoking use included cigarettes. He does not have any smokeless tobacco history on file. He reports that he does not currently use alcohol. He reports that he does not use drugs.     Allergies: Allergies[2]    Medications:   Medications Ordered Prior to Encounter[3]    Review of Systems:   A comprehensive review of systems was completed.    Pertinent positives and negatives noted in the HPI.    Objective:   Physical Exam:    /58 (BP Location: Right arm)   Pulse 72   Temp 97.9 °F (36.6 °C) (Oral)   Resp 18   Ht 5' 6\" (1.676 m)   Wt 150 lb (68 kg)   SpO2 94%   BMI 24.21 kg/m²   General: No acute distress, Alert  Respiratory: No rhonchi, no wheezes  Cardiovascular: S1, S2. Regular rate and rhythm  Abdomen: Soft, Non-tender, non-distended, positive bowel sounds  Neuro: No new focal deficits  Extremities: 1+ edema to both legs    Results:    Labs:      Labs Last 24 Hours:    Recent Labs   Lab 06/01/25  1740   RBC 4.61   HGB 13.1   HCT 38.5*   MCV 83.5   MCH 28.4   MCHC 34.0   RDW 16.0   NEPRELIM 9.48*   WBC 12.4*   .0       Recent Labs   Lab 06/01/25  1740   *   BUN 16   CREATSERUM 0.72   EGFRCR 83   CA 8.7   ALB 3.6   *   K 4.7   CL 96*   CO2 28.0   ALKPHO 87   AST 30   ALT 30   BILT 0.6   TP 6.5       Estimated Glomerular Filtration Rate: 83 mL/min/1.73m2 (result from lab).    Lab Results   Component Value Date    INR 1.25 (H) 06/01/2025    INR 1.05 09/18/2016    INR 1.03 07/06/2016       Recent Labs   Lab 06/01/25  1740   TROPHS 23       Recent Labs   Lab 06/01/25  1740   PBNP 5,393*       No results for input(s): \"PCT\" in the last 168 hours.    Imaging: Imaging data reviewed in Epic.    Assessment & Plan:      #Acute hypoxic respiratory failure 2/2 pneumonia  #SIRS possible sepsis 2/2 pneumonia   -Continue empiric antibiotics   -RPAN  -Wean O2 as tolerated    #Hypotension - resolved with IVF    #Syncope/fall with head trauma   CT brain negative. TTE. Orthostatic vitals    #Hyponatremia - repeat in AM     #Elevated pro-BNP, LE edema - TTE    #CAD s/p PCI  #Afib   #HTN  #HLD  #prior provoked VTE  #CVD with hx of CVA    Plan of care discussed with patient    Anneliese Irwin MD    Supplementary Documentation:      The 21st Century Cures Act makes medical notes like these available to patients in the interest of transparency. Please be advised this is a medical document. Medical documents are intended to carry relevant information, facts as evident, and the clinical opinion of the practitioner. The medical note is intended as peer to peer communication and may appear blunt or direct. It is written in medical language and may contain abbreviations or verbiage that are unfamiliar.                                       [1]   Family History  Problem Relation Age of Onset    Hypertension Sister     Cancer Brother    [2] No Known Allergies  [3]   No current facility-administered medications on file prior to encounter.     Current Outpatient Medications on File Prior to Encounter   Medication Sig Dispense Refill    bumetanide 0.5 MG Oral Tab Take 1 tablet (0.5 mg total) by mouth daily.      POTASSIUM CHLORIDE OR Take 10 mEq by mouth daily.      tamsulosin 0.4 MG Oral Cap       isosorbide mononitrate ER 30 MG Oral Tablet 24 Hr Take 1 tablet (30 mg total) by mouth in the morning.      pantoprazole 40 MG Oral Tab EC Take 1 tablet (40 mg total) by mouth in the morning and 1 tablet (40 mg total) in the evening. Take before meals.      multivitamin Oral Tab Take 1 tablet by mouth at bedtime.      Rosuvastatin Calcium (CRESTOR) 10 MG Oral Tab Take 1 tablet (10 mg total) by mouth in the morning.      aspirin 81 MG Oral Chew Tab Chew 1 tablet (81 mg total) by mouth in the morning.      apixaban (ELIQUIS) 2.5 MG Oral Tab Take 1 tablet (2.5 mg total) by mouth 2 (two) times daily. (Patient not taking: Reported on 6/1/2025) 60 tablet 0    lisinopril 10 MG Oral Tab Take 1 tablet (10 mg total) by mouth 2 (two) times daily. (Patient not taking: Reported on 6/1/2025)      NITROSTAT 0.4 MG Sublingual SL Tab

## 2025-06-02 NOTE — PHYSICAL THERAPY NOTE
PHYSICAL THERAPY EVALUATION - INPATIENT     Room Number: 525/525-A  Evaluation Date: 6/2/2025  Type of Evaluation: Initial  Physician Order: PT Eval and Treat    Presenting Problem: acute hypoxemic respiratory failure, hypotension, PNA  Co-Morbidities : PE, MI, HTN, stroke, deaf, cardiac stents  Reason for Therapy: Mobility Dysfunction and Discharge Planning    PHYSICAL THERAPY ASSESSMENT   Patient is a 89 year old male admitted 6/1/2025 for acute hypoxemic respiratory failure, hypotension, PNA, fall.  Prior to admission, patient's baseline is mod ind with cane or no AD.  Patient is currently functioning below baseline with bed mobility, transfers, gait, maintaining seated position, and standing prolonged periods.  Patient is requiring contact guard assist and minimal assist as a result of the following impairments: decreased functional strength, decreased endurance/aerobic capacity, impaired   balance, decreased muscular endurance, and increased O2 needs from baseline.  Physical Therapy will continue to follow for duration of hospitalization.    Patient will benefit from continued skilled PT Services at discharge to promote prior level of function.  Anticipate patient will return home with home health PT.    PLAN DURING HOSPITALIZATION  Nursing Mobility Recommendation : 1 Assist  PT Device Recommendation: Rolling walker  PT Treatment Plan: Bed mobility, Endurance, Energy conservation, Patient education, Gait training, Family education, Strengthening, Transfer training, Balance training  Rehab Potential : Good  Frequency (Obs): 3-5x/week     CURRENT GOALS    Goal #1 Patient is able to demonstrate supine - sit EOB @ level: supervision     Goal #2 Patient is able to demonstrate transfers Sit to/from Stand at assistance level: supervision     Goal #3 Patient is able to ambulate 150 feet with assist device: walker - rolling at assistance level: supervision     Goal #4    Goal #5    Goal #6    Goal Comments: Goals  established on 2025      PHYSICAL THERAPY MEDICAL/SOCIAL HISTORY  History related to current admission: Patient is a 89 year old male admitted on 2025 from home for  acute hypoxemic respiratory failure, hypotension, PNA, fall.  HOME SITUATION  Type of Home: House  Home Layout: One level (with basement)                     Lives With: Spouse    Drives: Yes   Patient Regularly Uses: Hearing aides, Cane      Prior Level of [place]: Pt is typically independent with ADLs and ambulates with no AD or a cane as needed.     SUBJECTIVE  \"I think I should probably start using a walker\"     OBJECTIVE  Precautions: Bed/chair alarm, Hard of hearing  Fall Risk: High fall risk    WEIGHT BEARING RESTRICTION     PAIN ASSESSMENT  Ratin          COGNITION  Overall Cognitive Status:  WFL - within functional limits    RANGE OF MOTION AND STRENGTH ASSESSMENT  Upper extremity ROM and strength are within functional limits     Lower extremity ROM is within functional limits     Lower extremity strength is within functional limits     BALANCE  Static Sitting: Fair -  Dynamic Sitting: Poor +  Static Standing: Poor +  Dynamic Standing: Poor +    ADDITIONAL TESTS                                    ACTIVITY TOLERANCE   BP taken in supine, sitting, and standing and was stable, pt denied dizziness.                       O2 WALK  Oxygen Therapy  SPO2% on Oxygen at Rest: 92  At rest oxygen flow (liters per minute): 4  SPO2% Ambulation on Oxygen: 89  Ambulation oxygen flow (liters per minute): 4    NEUROLOGICAL FINDINGS                        AM-PAC '6-Clicks' INPATIENT SHORT FORM - BASIC MOBILITY  How much difficulty does the patient currently have...  Patient Difficulty: Turning over in bed (including adjusting bedclothes, sheets and blankets)?: None   Patient Difficulty: Sitting down on and standing up from a chair with arms (e.g., wheelchair, bedside commode, etc.): A Little   Patient Difficulty: Moving from lying on back to  sitting on the side of the bed?: A Little   How much help from another person does the patient currently need...   Help from Another: Moving to and from a bed to a chair (including a wheelchair)?: A Little   Help from Another: Need to walk in hospital room?: A Little   Help from Another: Climbing 3-5 steps with a railing?: A Little     AM-PAC Score:  Raw Score: 19   Approx Degree of Impairment: 41.77%   Standardized Score (AM-PAC Scale): 45.44   CMS Modifier (G-Code): CK    FUNCTIONAL ABILITY STATUS  Gait Assessment   Functional Mobility/Gait Assessment  Gait Assistance: Contact guard assist  Distance (ft): 20, 15  Assistive Device: Rolling walker  Pattern: Shuffle    Skilled Therapy Provided: Per RN okay to work with pt. Pt received in supine and was agreeable to PT session.     Bed Mobility:  Rolling: NT  Supine to sit: supervision, increased time to complete task. While sitting, pt occasionally with posterior lean that required verbal cues to min A to correct.    Sit to supine: NT     Transfer Mobility:  Sit to stand: CGA    Stand to sit: CGA   Gait = pt ambulated to/from bathroom with RW and CGA     In bathroom pt needed assistance with brief management and pericare, pt with posterior lean while sitting on toilet.     Therapist's Comments: Pt educated on role of therapy, goals for session, safety, fall prevention, and activity recommendations.     Exercise/Education Provided:  Bed mobility  Energy conservation  Functional activity tolerated  Gait training  Posture  Strengthening  Transfer training    Patient End of Session: Up in chair, Needs met, Call light within reach, RN aware of session/findings, All patient questions and concerns addressed, Hospital anti-slip socks, Alarm set      Patient Evaluation Complexity Level:  History Moderate - 1 or 2 personal factors and/or co-morbidities   Examination of body systems Low -  addressing 1-2 elements   Clinical Presentation Low- Stable   Clinical Decision Making  Low Complexity       PT Session Time: 25 minutes  Therapeutic Activity: 8 minutes

## 2025-06-02 NOTE — PLAN OF CARE
A&OX3. Maintaining O2 on 2L. Tele, A fib. denies pain. Up with walker X1. PIV Unasyn IV. Cardiac diet. No further needs at this time. Continue POC. Safety precaution in place.   Problem: Patient/Family Goals  Goal: Patient/Family Long Term Goal  Description: Patient's Long Term Goal: discharge to home    Interventions:  - Follow POC  - See additional Care Plan goals for specific interventions  Outcome: Progressing  Goal: Patient/Family Short Term Goal  Description: Patient's Short Term Goal: 6/1NOC:rest,safety    Interventions:   - cluster care  - See additional Care Plan goals for specific interventions  Outcome: Progressing

## 2025-06-02 NOTE — CM/SW NOTE
06/02/25 1400   CM/SW Referral Data   Referral Source Social Work (self-referral)   Reason for Referral Discharge planning   Informant Patient;EMR;Clinical Staff Member   Patient Info   Patient's Current Mental Status at Time of Assessment Alert;Oriented   Patient's Home Environment House   Patient lives with Spouse/Significant other   Discharge Needs   Anticipated D/C needs Home health care;To be determined     HOME SITUATION  Type of Home: House  Home Layout: One level (with basement)      Lives With: Spouse    Drives: Yes   Patient Regularly Uses: Hearing aides, Cane       Prior Level of [place]: Pt is typically independent with ADLs and ambulates with no AD or a cane as needed.   (Per PT evaluation)       Patient is a 89/ y/o male who admitted with Acute hypoxemic respiratory failure, Community acquired pneumonia, Acute on chronic congestive heart failure.   Anticipated therapy need: Home with Home Healthcare    Met with patient to discuss discharge planning. He states he lives at home with his wife. Discussed HH, he and his wife have history with [facility]. He was agreeable with SW contacting family as well.    LVM with [facility] Guillermo.     Sent referral to [facility] on aidin.     TIGIST/PARRISH to remain available for support and/or discharge planning.    Sugar WEAVER LCSW  Discharge Planner

## 2025-06-03 LAB
ANION GAP SERPL CALC-SCNC: 12 MMOL/L (ref 0–18)
BASOPHILS # BLD AUTO: 0.05 X10(3) UL (ref 0–0.2)
BASOPHILS NFR BLD AUTO: 0.5 %
BUN BLD-MCNC: 15 MG/DL (ref 9–23)
CALCIUM BLD-MCNC: 8.4 MG/DL (ref 8.7–10.6)
CHLORIDE SERPL-SCNC: 101 MMOL/L (ref 98–112)
CO2 SERPL-SCNC: 24 MMOL/L (ref 21–32)
CREAT BLD-MCNC: 0.79 MG/DL (ref 0.7–1.3)
EGFRCR SERPLBLD CKD-EPI 2021: 80 ML/MIN/1.73M2 (ref 60–?)
EOSINOPHIL # BLD AUTO: 0.1 X10(3) UL (ref 0–0.7)
EOSINOPHIL NFR BLD AUTO: 1 %
ERYTHROCYTE [DISTWIDTH] IN BLOOD BY AUTOMATED COUNT: 16.3 %
GLUCOSE BLD-MCNC: 95 MG/DL (ref 70–99)
HCT VFR BLD AUTO: 35 % (ref 39–53)
HGB BLD-MCNC: 11.8 G/DL (ref 13–17.5)
IMM GRANULOCYTES # BLD AUTO: 0.11 X10(3) UL (ref 0–1)
IMM GRANULOCYTES NFR BLD: 1.1 %
LYMPHOCYTES # BLD AUTO: 1.45 X10(3) UL (ref 1–4)
LYMPHOCYTES NFR BLD AUTO: 14.2 %
MCH RBC QN AUTO: 28.2 PG (ref 26–34)
MCHC RBC AUTO-ENTMCNC: 33.7 G/DL (ref 31–37)
MCV RBC AUTO: 83.7 FL (ref 80–100)
MONOCYTES # BLD AUTO: 0.85 X10(3) UL (ref 0.1–1)
MONOCYTES NFR BLD AUTO: 8.3 %
NEUTROPHILS # BLD AUTO: 7.68 X10 (3) UL (ref 1.5–7.7)
NEUTROPHILS # BLD AUTO: 7.68 X10(3) UL (ref 1.5–7.7)
NEUTROPHILS NFR BLD AUTO: 74.9 %
OSMOLALITY SERPL CALC.SUM OF ELEC: 285 MOSM/KG (ref 275–295)
PLATELET # BLD AUTO: 355 10(3)UL (ref 150–450)
POTASSIUM SERPL-SCNC: 4 MMOL/L (ref 3.5–5.1)
RBC # BLD AUTO: 4.18 X10(6)UL (ref 3.8–5.8)
SODIUM SERPL-SCNC: 137 MMOL/L (ref 136–145)
WBC # BLD AUTO: 10.2 X10(3) UL (ref 4–11)

## 2025-06-03 PROCEDURE — 99233 SBSQ HOSP IP/OBS HIGH 50: CPT | Performed by: HOSPITALIST

## 2025-06-03 RX ORDER — BUMETANIDE 0.25 MG/ML
1 INJECTION, SOLUTION INTRAMUSCULAR; INTRAVENOUS ONCE
Status: COMPLETED | OUTPATIENT
Start: 2025-06-03 | End: 2025-06-03

## 2025-06-03 RX ORDER — POLYETHYLENE GLYCOL 3350 17 G/17G
17 POWDER, FOR SOLUTION ORAL DAILY
Status: DISCONTINUED | OUTPATIENT
Start: 2025-06-03 | End: 2025-06-04

## 2025-06-03 RX ORDER — BUMETANIDE 0.25 MG/ML
2 INJECTION, SOLUTION INTRAMUSCULAR; INTRAVENOUS 3 TIMES DAILY
Status: DISCONTINUED | OUTPATIENT
Start: 2025-06-03 | End: 2025-06-04

## 2025-06-03 RX ORDER — DOCUSATE SODIUM 100 MG/1
100 CAPSULE, LIQUID FILLED ORAL 2 TIMES DAILY
Status: DISCONTINUED | OUTPATIENT
Start: 2025-06-03 | End: 2025-06-03

## 2025-06-03 NOTE — SLP NOTE
SPEECH DAILY NOTE - INPATIENT    ASSESSMENT & PLAN   ASSESSMENT  Pt seen for dysphagia tx to assess tolerance with recommended diet, ensure proper utilization of aspiration precautions and provide pt/family education.  Patient received alert in bed with breakfast tray present at bedside. Patient reported good tolerance of PO intake. SLP provided assistance with tray set up. Bolus acceptance was adequate without evidence of anterior bolus loss. Mastication and AP bolus transit were thorough and efficient without evidence of oral residue. No overt s/s of aspiration observed and patient denied odynophagia and globus sensation across consistencies. Recommend patient continue a regular diet and thin liquids with precautions listed below. No further SLP services recommended at this time as patient tolerating a generalized diet. Education provided re: recommendations.    Diet Recommendations - Solids: Regular  Diet Recommendations - Liquids: Thin Liquids       Aspiration Precautions: Upright position, Small bites, Small sips  Medication Administration Recommendations: One pill at a time    Patient Experiencing Pain: No                Treatment Plan  Treatment Plan/Recommendations: No further inpatient SLP service warranted    Interdisciplinary Communication: Discussed with RN            GOALS  Goal #1 The patient will tolerate regular consistency and thin liquids without overt signs or symptoms of aspiration with 90 % accuracy over 1-2 session(s).  Met   Goal #2 The patient/family/caregiver will demonstrate understanding and implementation of aspiration precautions and swallow strategies independently over 1-2 session(s).     Met   Goal #3       Goal #4       Goal #5       Goal #6       Goal #7       Goal #8            FOLLOW UP  Follow Up Needed (Documentation Required): No  SLP Follow-up Date: 06/03/25  Duration: 1 week    Session: 1/1    If you have any questions, please contact BRANDIN Prasad

## 2025-06-03 NOTE — PROGRESS NOTES
AO x4. Forgetful. 2L at rest. RA is BL. Tele NSR/SB. IV Bumex. Lovenox. I/O's Continent. NO pain reported. Up x1 walker. PO Doxy. Unasyn. IV SL. Cardiac diet. Pt resting in bed with call light in reach. No further needs.

## 2025-06-03 NOTE — PROGRESS NOTES
Cardiology Progress Note        Beba Mijares Patient Status:  Inpatient    1926 MRN [id number]   Location [facility] 5NW-A Attending Trevor Oden MD   Hosp Day # 2 PCP CLARK HIGGINS     Subjective:  Breathing comfortably with nasal canula on his chest.  Nursing reports no issues overnight.    Medications:  Scheduled Medications[1]    Continuous Infusions:  Medication Infusions[2]      Allergies:  Allergies[3]      Intake/Output:    Intake/Output Summary (Last 24 hours) at 6/3/2025 0627  Last data filed at 6/3/2025 0400  Gross per 24 hour   Intake --   Output 1675 ml   Net -1675 ml           Last 3 Weights   25 2141 150 lb (68 kg)   25 2133 150 lb 8 oz (68.3 kg)   25 1733 140 lb (63.5 kg)   25 0644 140 lb (63.5 kg)   25 0442 140 lb 3.2 oz (63.6 kg)   02/15/25 1850 100 lb (45.4 kg)   24 0221 142 lb 3.2 oz (64.5 kg)   24 2218 140 lb (63.5 kg)            Physical Exam:    Temp:  [97.9 °F (36.6 °C)-99.3 °F (37.4 °C)] 98.4 °F (36.9 °C)  Pulse:  [61-71] 66  Resp:  [18-22] 20  BP: (105-128)/(53-69) 119/59  SpO2:  [93 %-97 %] 95 %    General: No apparent distress  HEENT: No focal deficits.  Neck: supple. JVP normal  Cardiac: Regular rhythm, S1, S2 normal,  rub or gallop.  No murmur.  Lungs: CTA  Abdomen: Soft, non-tender.   Extremities: No edema  Neurologic: no focal deficits  Skin: Warm and dry.     Telemetry:     EKG:      Echo:      Cardiac Cath:      Labs:  HEM:  Recent Labs   Lab 25  0750   WBC 12.4* 11.6*   HGB 13.1 12.1*   .0 371.0       Chem:  Recent Labs   Lab 25  17425  0750   * 134*   K 4.7 4.7   CL 96* 100   CO2 28.0 23.0   BUN 16 12   CREATSERUM 0.72 0.70   CA 8.7 8.5*   MG  --  2.2   PHOS  --  3.4   * 121*       Recent Labs   Lab 25  1740 25  0750   ALT 30 29   AST 30 28   ALB 3.6 3.5       No results for input(s): \"TROP\", \"CK\" in the last 168 hours.    Recent Labs   Lab  06/01/25  1740   PTP 15.8*   INR 1.25*                 Impression:  S/p fall, possible syncope - hypotensive on admission, certainly may be orthostatic.  Acute hypoxic resp failure - CXR and pBNP suggests acute HFrEF.  HFrEF - new EF decline to 35% on echo March 2025.  EF 25-30% on echo 6/2 with appearance of extensive LAD infarct.  Possible pneumonia, on Abx.  CAD, previous PCI  Prior CVA  Advanced age.  PAF, on amio.          Plan:  No plans for aggressive approach to CAD.  Need daily weights.  Continue IV bumex until dry.  Same aspirin and amio.  GDMT just diuretics.  Can add ACE I pending BP.  Labs pending.  Mobilize, dc planning.    Addendum:  BMP stable, BP stable, will escalate bumex dosing.          Keanu Abreu MD  6/3/2025  6:27 AM  L3         [1]    bumetanide  1 mg Intravenous BID (Diuretic)    ampicillin-sulbactam  3 g Intravenous q6h    amiodarone  200 mg Oral QPM    aspirin  81 mg Oral Daily    pantoprazole  40 mg Oral BID AC    rosuvastatin  10 mg Oral Daily    tamsulosin  0.4 mg Oral QPM    doxycycline  100 mg Oral BID    enoxaparin  40 mg Subcutaneous Daily   [2] [3] No Known Allergies

## 2025-06-03 NOTE — PLAN OF CARE
A/o x4. Weaned to room air. NSR/SB on tele. VSS. Up SBA w walker. Ambulates to bathroom PRN. Reports last BM more than 2 days ago. Voids. IV bumex, unasyn and PO doxy. Safety precautions in place. No further needs at this time.   Problem: Patient/Family Goals  Goal: Patient/Family Long Term Goal  Description: Patient's Long Term Goal: discharge to home    Interventions:  - Follow POC  - See additional Care Plan goals for specific interventions  Outcome: Progressing  Goal: Patient/Family Short Term Goal  Description: Patient's Short Term Goal: 6/1NOC:rest,safety    Interventions:   - cluster care  - See additional Care Plan goals for specific interventions  Outcome: Progressing

## 2025-06-03 NOTE — PAYOR COMM NOTE
--------------  ADMISSION REVIEW   6/1-6/3  Payor: [employer]  Subscriber #:  [id number]  Authorization Number: [id number]    Admit date: 6/1/25  Admit time:  9:22 PM       REVIEW DOCUMENTATION:     ED Provider Notes        ED Provider Notes signed by Greg Birch MD at 6/2/2025  2:29 AM       Author: Greg Birch MD Service: Emergency Medicine Author Type: Physician    Filed: 6/2/2025  2:29 AM Date of Service: 6/1/2025  6:04 PM Status: Signed    : Greg Birch MD (Physician)           Patient Seen in: [facility] Emergency Department    Chief Complaint   Patient presents with    Difficulty Breathing     Stated Complaint: jeff    Subjective:   89-year-old male presents today for evaluation.  History is limited as patient is hard of hearing and has difficulty elaborating on his symptoms.  Patient reports cough,  shortness of breath and a chest pain that is somewhat ongoing for the last 3 days.  He fell today, leading to him hitting his head on the bed.  He initially denied syncope however then says he thinks he passed out.  He ultimately is unsure how he ended up on the ground.  He does not have any fevers or leg swelling.      Objective:     Past Medical History:    Atherosclerosis of coronary artery    Stents 3    BLOOD CLOTS    Colitis    Deaf    Heart attack (HCC)    NSTEMI    High blood pressure    High cholesterol    Kidney stone    Pulmonary embolism (HCC)    DVT & PE POst  Fractured Metatarsil    Stroke (HCC)     ED Triage Vitals   BP 06/01/25 1733 117/86   Pulse 06/01/25 1733 67   Resp 06/01/25 1733 18   Temp 06/01/25 1738 98.1 °F (36.7 °C)   Temp src 06/01/25 1738 Oral   SpO2 06/01/25 1729 91 %   O2 Device 06/01/25 1729 None (Room air)       Current Vitals:   Vital Signs  BP: 120/58  Pulse: 72  Resp: 18  Temp: 97.9 °F (36.6 °C)  Temp src: Oral  MAP (mmHg): 76    Oxygen Therapy  SpO2: 94 %  O2 Device: Nasal cannula  O2 Flow Rate (L/min): 2  L/min  Pulse Oximetry Type: Continuous      Physical Exam  Vitals and nursing note reviewed.   Constitutional:       Appearance: Normal appearance.   HENT:      Head: Normocephalic.      Nose: Nose normal.      Mouth/Throat:      Mouth: Mucous membranes are moist.   Eyes:      Extraocular Movements: Extraocular movements intact.   Cardiovascular:      Rate and Rhythm: Normal rate and regular rhythm.   Pulmonary:      Effort: Pulmonary effort is normal.      Breath sounds: Wheezing present.   Abdominal:      General: Abdomen is flat.   Musculoskeletal:         General: Normal range of motion.   Skin:     General: Skin is warm.   Neurological:      General: No focal deficit present.      Mental Status: He is alert.      Cranial Nerves: No cranial nerve deficit.      Sensory: No sensory deficit.      Motor: No weakness.   Psychiatric:         Mood and Affect: Mood normal.     Labs Reviewed   CBC WITH DIFFERENTIAL WITH PLATELET - Abnormal; Notable for the following components:       Result Value    WBC 12.4 (*)     HCT 38.5 (*)     Neutrophil Absolute Prelim 9.48 (*)     Neutrophil Absolute 9.48 (*)     Monocyte Absolute 1.05 (*)     All other components within normal limits   COMP METABOLIC PANEL (14) - Abnormal; Notable for the following components:    Glucose 120 (*)     Sodium 131 (*)     Chloride 96 (*)     Calculated Osmolality 274 (*)     All other components within normal limits   PRO BETA NATRIURETIC PEPTIDE - Abnormal; Notable for the following components:    Pro-Beta Natriuretic Peptide 5,393 (*)     All other components within normal limits   PROTHROMBIN TIME (PT) - Abnormal; Notable for the following components:    PT 15.8 (*)     INR 1.25 (*)     All other components within normal limits   TROPONIN I HIGH SENSITIVITY - Normal   PTT, ACTIVATED - Normal   LACTIC ACID, PLASMA - Normal   RESPIRATORY FLU EXPAND PANEL + COVID-19 - Normal    Narrative:     This test is intended for the simultaneous qualitative  detection and differentiation of nucleic acids from multiple viral and bacterial respiratory organisms, including nucleic acid from Severe Acute Respiratory Syndrome Coronavirus 2 (SARS-CoV-2) in nasopharyngeal swab from individuals suspected of respiratory viral infection consistent with COVID-19 by their healthcare provider.    Test performed using the [employer] Respiratory Panel 2.1 (RP2.1) assay on the [employer] 2.0 System, [place], [employer], [place] [address].    This test is being used under the Food and Drug Administration's Emergency Use Authorization.    The authorized Fact Sheet for Healthcare Providers for this assay is available upon request from the laboratory.    SARS and MERS coronaviruses are not tested on this assay.   ED/MRSA SCREEN BY PCR-CC - Normal   LEGIONELLA URINE AG SEROGRP 1   COMP METABOLIC PANEL (14)   MAGNESIUM   PHOSPHORUS   CBC, PLATELET; NO DIFFERENTIAL   RAINBOW DRAW LAVENDER   RAINBOW DRAW LIGHT GREEN   RAINBOW DRAW BLUE   BLOOD CULTURE   BLOOD CULTURE     EKG    Rate, intervals and axes as noted on EKG Report.  Rate: 65  Rhythm: Sinus Rhythm  Reading: No STEMI     CT BRAIN OR HEAD (CPT=70450)  Result Date: 6/1/2025  PROCEDURE:  CT BRAIN OR HEAD (88394)  COMPARISON:  [facility], CT, CT BRAIN OR HEAD (48057), 6/03/2024, 11:02 PM.  INDICATIONS:  jeff  TECHNIQUE:  Noncontrast CT scanning is performed through the brain. Dose reduction techniques were used. Dose information is transmitted to the ACR (American College of Radiology) NRDR (National Radiology Data Registry) which includes the Dose Index Registry.  PATIENT STATED HISTORY: (As transcribed by Technologist)  JEFF.    FINDINGS:  VENTRICLES/SULCI:  Diffuse cerebral and cerebellar atrophy.  Stable calcified meningioma along the midline falx measuring 9 x 5 mm. INTRACRANIAL:  No acute intracranial hemorrhage.  There is encephalomalacia involving the left fronto parietal and posterior temporal lobe in the distribution of  the left MCA consistent with old infarction.  There is no midline shift.  No mass effect.  There are bilateral chronic basal ganglia lacunar infarctions.  Patchy decreased attenuation in the periventricular white matter of both cerebral hemispheres which is nonspecific although compatible with chronic microvascular ischemic changes of aging.  SINUSES:           No sign of acute sinusitis.  MASTOIDS:          No sign of acute inflammation. SKULL:             No depressed calvarial fracture.            CONCLUSION:  No CT evidence for acute intracranial hemorrhage or mass effect.   Diffuse cerebral and cerebellar atrophy with chronic microvascular ischemic changes of aging and old left MCA distribution infarction.    LOCATION:  [facility]   Dictated by (CST): Rosy Hinds MD on 6/01/2025 at 8:20 PM     Finalized by (CST): Rosy Hinds MD on 6/01/2025 at 8:22 PM       XR CHEST AP PORTABLE  (CPT=71045)  Result Date: 6/1/2025  PROCEDURE:  XR CHEST AP PORTABLE  (CPT=71045)  TECHNIQUE:  AP chest radiograph was obtained.  COMPARISON:  [facility], XR, XR CHEST AP PORTABLE  (CPT=71045), 2/15/2025, 9:07 PM.  [facility] , XR, XR CHEST AP PORTABLE  (CPT=71045), 2/17/2025, 9:34 AM.  INDICATIONS:  jeff  PATIENT STATED HISTORY: (As transcribed by Technologist)  patient states recent shortness of breath and had a fall today.             CONCLUSION:  Diffuse bilateral pulmonary infiltrates with more focal consolidation/atelectasis retrocardiac left lung base.  Bilateral pleural effusions.  Stable heart size..   LOCATION:  [facility]      Dictated by (CST): Rosy Hinds MD on 6/01/2025 at 8:12 PM     Finalized by (CST): Rosy Hinds MD on 6/01/2025 at 8:12 PM       MDM     Differential Diagnosis  89-year-old male presents today for evaluation of several days of shortness of breath and cough.  He may have suffered a syncopal episode today.  EMS was called and he was noted to be hypoxic on arrival.  He is satting well on nasal cannula.  On  auscultation, he has diffuse wheezing.  Patient is reported to be anticoagulated on Eliquis, plan for CT of the head to assess for intracranial hemorrhage.  Differential includes bronchitis, pneumonia, pulmonary edema.  Plan for x-ray along with labs.    8:44 pm  X-ray with findings concerning for pneumonia.  His proBNP is elevated so there could be a component of CHF as well.  IV antibiotics ordered.  Patient had a transient low blood pressure in the ED which responded to fluids.  Will admit to the hospital for further care, I spoke with the hospitalist in regards to admission.    I spoke with patient's daughter, patient had been recently taken off the Eliquis.    Discussions of Management  I spoke with the hospitalist in regards to admission    Independent Interpretation  I independently interpreted x-ray, bilateral infiltrate noted    Admission disposition: 6/1/2025  8:45 PM      Disposition and Plan     Clinical Impression:  1. Acute hypoxemic respiratory failure (HCC)    2. Acute bronchospasm    3. Community acquired pneumonia, unspecified laterality    4. Acute on chronic congestive heart failure, unspecified heart failure type (HCC)         Disposition:  Admit  6/1/2025  8:45 pm    Hospital Problems       Present on Admission  Date Reviewed: 4/9/2024          ICD-10-CM Noted POA    * (Principal) Acute hypoxemic respiratory failure (HCC) J96.01 6/1/2025 Unknown    Acute bronchospasm J98.01 6/1/2025 Unknown    Acute on chronic congestive heart failure, unspecified heart failure type (HCC) I50.9 6/1/2025 Unknown    Community acquired pneumonia, unspecified laterality J18.9 6/1/2025 Unknown               6/1 H&P    Beba Mijares is a 89 year old male with CAD s/p PCI, HTN, HLD, prior provoked VTE, CVD with hx of CVA presented with cough, SOB and syncope.      Patient is deaf so history was obtained from multiple sources. Patient struggling with a cough x 3-4 days. He experienced a fall, possibly syncope today  morning at 5 AM with head trauma. Later in the day he was experiencing SOB so family called paramedics.      Patient was afebrile, hypotensive to 80s systolic in the ED. HE was requiring 4 L NC. CT brain was negative. CXR with diffuse bilateral pulmonary infiltrates with more focal consolidation/atelectasis retrocardiac left lung base. Bilateral pleural effusions. He was given albuterol neb, IV antibiotics, 1 L NS bolus and admitted.       /58 (BP Location: Right arm)   Pulse 72   Temp 97.9 °F (36.6 °C) (Oral)   Resp 18   Ht 5' 6\" (1.676 m)   Wt 150 lb (68 kg)   SpO2 94%   BMI 24.21 kg/m²       Extremities: 1+ edema to both legs       #Acute hypoxic respiratory failure 2/2 pneumonia  #SIRS possible sepsis 2/2 pneumonia   -Continue empiric antibiotics   -RPAN  -Wean O2 as tolerated     #Hypotension - resolved with IVF     #Syncope/fall with head trauma   CT brain negative. TTE. Orthostatic vitals    #Hyponatremia - repeat in AM      #Elevated pro-BNP, LE edema - TTE     #CAD s/p PCI  #Afib   #HTN  #HLD  #prior provoked VTE  #CVD with hx of CVA            6/2  Patient admits to cough, dry. Shortness of breath. No chest pain. On oxygen.      Current medications:  Scheduled Medications    bumetanide  1 mg Intravenous BID (Diuretic)    ampicillin-sulbactam  3 g Intravenous q6h        Temp:  [97.9 °F (36.6 °C)-99.3 °F (37.4 °C)] 99.3 °F (37.4 °C)  Pulse:  [57-85] 71  Resp:  [14-24] 20  BP: ()/(56-86) 127/60  SpO2:  [85 %-100 %] 93 %      Respiratory: Crackles bilaterally     Extremities: + edema.       Lab 06/01/25 1740 06/02/25  0750   WBC 12.4* 11.6*   HGB 13.1 12.1*   MCV 83.5 82.9   .0 371.0   INR 1.25*  --               Recent Labs   Lab 06/01/25  1740 06/02/25  0750   * 121*   BUN 16 12   CREATSERUM 0.72 0.70   CA 8.7 8.5*   ALB 3.6 3.5   * 134*   K 4.7 4.7   CL 96* 100   CO2 28.0 23.0   ALKPHO 87 82   AST 30 28   ALT 30 29   BILT 0.6 0.6   TP 6.5 6.2       Assessment &  Plan:  Syncope, CTb neg, concern for arrhythmia given depressed EF  ECHO   Orthostatics > negative   Telemetry   Acute hypoxic respiratory failure d/t possible pneumonia though suspect more acute on chronic heart failure, RVP neg  IV abx  IV diuretics   Oxygen, wean as able   Check PCT   Acute on chronic systolic heart failure - only taking Bumex as needed  Start Bumex IV  ECHO  Daily weight  Monitor I/O  Cardiology consult   Atrial fibrillation, not on anticoagulation  Amiodarone  Telemetry   Hypotension on arrival, resolved   CAD sp PCI  Essential hypertension  Dyslipidemia  Aspirin  Crestor  Lisinopril on hold   Monitor hemodynamics   BPH  Flomax  Leukocytosis,improving    Hyponatremia, improving  Hypercoagulable state, history of post-op DVT/PE off Eliquis  Cerebrovascular disease   Carotid stenosis sp stent  Hearing impairment      DVT Px: Lovenox      At this point Mr. Mijares is expected to be discharge to: TBD, PT evaluation         6/2 Cardiology    Impression:   S/p fall, possible syncope - hypotensive on admission, certainly may be orthostatic.  Acute hypoxic resp failure - CXR and pBNP suggests acute HFrEF.  HFrEF - new EF decline to 35% on echo March 2025.  Possible pneumonia, on Abx.  CAD, previous PCI  Prior CVA  Advanced age.     Plan:  Agree with IV diuresis.  Continue amiodarone.  Ptx, goals of care discussions.  No plans for ischemic cardiac w/u.  Follow on tele.  Stop lisinopril and isosorbide with low BP being more of a risk for him.            6/3     bumetanide  1 mg Intravenous BID (Diuretic)    ampicillin-sulbactam  3 g Intravenous q6h       Temp:  [97.9 °F (36.6 °C)-99.1 °F (37.3 °C)] 97.9 °F (36.6 °C)  Pulse:  [61-73] 62  Resp:  [18-22] 19  BP: (105-129)/(53-69) 129/58  SpO2:  [89 %-97 %] 96 %      Respiratory: Crackles bilaterally, mild wheeze, increased aeration   Cardiovascular: S1, S2.   Abdomen: Soft, nontender, nondistended.  Positive bowel sounds.  Extremities: + edema - improving      Lab 06/01/25  1740 06/02/25  0750 06/03/25  0614   WBC 12.4* 11.6* 10.2   HGB 13.1 12.1* 11.8*   MCV 83.5 82.9 83.7   .0 371.0 355.0   INR 1.25*  --   --                Recent Labs   Lab 06/01/25  1740 06/02/25  0750 06/03/25  0614   * 121* 95   BUN 16 12 15   CREATSERUM 0.72 0.70 0.79   CA 8.7 8.5* 8.4*   ALB 3.6 3.5  --    * 134* 137   K 4.7 4.7 4.0   CL 96* 100 101   CO2 28.0 23.0 24.0         Assessment & Plan:  Syncope, CTb neg, concern for arrhythmia given depressed EF, EF now 25-30%  Aortic stenosis, mild-moderate   Telemetry   Acute hypoxic respiratory failure d/t possible pneumonia though suspect more acute on chronic heart failure, RVP neg, PCT  IV abx - short course   IV diuretics   Oxygen, wean as able - currently 3L   Incentive spirometry  Increase activity, OOB to chair  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 - only taking Bumex as needed PTA  Bumex IV  Daily weight   Monitor I/O - inaccurate   Cardiology consult   Atrial fibrillation, not on anticoagulation  Amiodarone  Telemetry   Hypotension on arrival, resolved   CAD sp PCI  Essential hypertension  Dyslipidemia  Aspirin  Crestor  PTA antihypertensives on hold  Monitor hemodynamics   BPH  Flomax  Constipation  Bowel care   Leukocytosis,improving    Hyponatremia, improving  Hypercoagulable state, history of post-op DVT/PE off Eliquis  Cerebrovascular disease   Carotid stenosis sp stent  Hearing impairment       6/3 Cardiology    Need daily weights.  Continue IV bumex until dry.  Same aspirin and amio.  GDMT just diuretics.  Can add ACE I pending BP.  Labs pending.  Mobilzora, ana planning.     Addendum:  BMP stable, BP stable, will escalate bumex dosing.      MEDICATIONS ADMINISTERED IN LAST 1 DAY:  acetaminophen (Tylenol Extra Strength) tab 500 mg       Date Action Dose Route User    6/3/2025 0951 Given 500 mg Oral Edita Zamora, RN          amiodarone (Pacerone) tab 200 mg        Date Action Dose Route User    6/2/2025 1735 Given 200 mg Oral Jerri Cleary RN          ampicillin-sulbactam (Unasyn) 3 g in sodium chloride 0.9% 100mL IVPB-ADD       Date Action Dose Route User    6/3/2025 0924 New Bag 3 g Intravenous Edita Zamora RN    6/3/2025 0312 New Bag 3 g Intravenous Cecille Ryan RN    6/2/2025 1934 New Bag 3 g Intravenous Cecille Ryan RN    6/2/2025 1446 New Bag 3 g Intravenous Jerri Cleary RN          aspirin chewable tab 81 mg       Date Action Dose Route User    6/3/2025 0924 Given 81 mg Oral Edita Zamora RN          bumetanide (Bumex) 0.25 MG/ML injection 1 mg       Date Action Dose Route User    6/3/2025 0925 Given 1 mg Intravenous Edita Zamora RN    6/2/2025 1735 Given 1 mg Intravenous Jerri Cleary RN          docusate sodium (Colace) cap 100 mg       Date Action Dose Route User    6/3/2025 0951 Given 100 mg Oral Edita Zamora RN          doxycycline (Vibramycin) cap 100 mg       Date Action Dose Route User    6/3/2025 0925 Given 100 mg Oral Edita Zamora RN    6/2/2025 1935 Given 100 mg Oral Cecille Ryan RN          enoxaparin (Lovenox) 40 MG/0.4ML SUBQ injection 40 mg       Date Action Dose Route User    6/3/2025 0926 Given 40 mg Subcutaneous (Left Lower Abdomen) Edita Zamora RN          pantoprazole (Protonix) DR tab 40 mg       Date Action Dose Route User    6/3/2025 0505 Given 40 mg Oral Cecille Ryan RN    6/2/2025 1735 Given 40 mg Oral Jerri Cleary RN          polyethylene glycol (PEG 3350) (Miralax) 17 g oral packet 17 g       Date Action Dose Route User    6/3/2025 0951 Given 17 g Oral Edita Zamora RN          Perflutren Lipid Microsphere (DEFINITY) 6.52 MG/ML injection 1.5 mL       Date Action Dose Route User    6/2/2025 1649 Given 1.5 mL Intravenous Rusty Perry          rosuvastatin (Crestor) tab 10 mg       Date Action Dose Route User    6/3/2025 0968 Given 10 mg Oral Edita Zamora RN           tamsulosin (Flomax) cap 0.4 mg       Date Action Dose Route User    6/2/2025 1735 Given 0.4 mg Oral Jerri Cleary, RN            Vitals (last day)       Date/Time Temp Pulse Resp BP SpO2 Weight O2 Device O2 Flow Rate (L/min) [facility]    06/03/25 0900 -- -- -- -- 96 % -- Nasal cannula 2 L/min SB    06/03/25 0759 97.9 °F (36.6 °C) 62 19 129/58 94 % -- Nasal cannula 2 L/min TJ    06/03/25 0640 -- 73 -- -- 89 % 145 lb 4.5 oz (65.9 kg) -- -- ND    06/03/25 0601 -- -- -- -- -- -- Nasal cannula 2 L/min AC    06/03/25 0420 98.4 °F (36.9 °C) 66 20 119/59 95 % -- -- -- ND    06/02/25 2235 99.1 °F (37.3 °C) 62 22 105/59 95 % -- Nasal cannula 3 L/min ND    06/02/25 1937 -- 71 -- -- 96 % -- -- -- ND    06/02/25 1937 98 °F (36.7 °C) -- 18 123/65 -- -- Nasal cannula 3 L/min AC    06/02/25 1900 -- 61 -- -- 95 % -- -- -- ND    06/02/25 1804 97.9 °F (36.6 °C) 68 20 128/69 97 % -- None (Room air) --     06/02/25 1128 98.8 °F (37.1 °C) -- 18 -- -- -- Nasal cannula 2 L/min CD    06/02/25 1128 -- 67 -- 119/53 -- -- -- --     06/02/25 0816 99.3 °F (37.4 °C) 71 20 127/60 93 % -- Nasal cannula 2 L/min V    06/02/25 0557 99 °F (37.2 °C) 85 24 134/72 92 % -- Nasal cannula 2 L/min DS    06/02/25 0555 -- 85 -- 134/72 85 % -- -- -- DS    06/02/25 0550 -- 70 -- 127/66 87 % -- -- -- DS          CIWA Scores (since admission)       None

## 2025-06-03 NOTE — PROGRESS NOTES
[facility]   part of [facility]     Hospitalist Progress Note     Beba Mijares Patient Status:  Inpatient    1926 MRN [id number]   Location [facility] 5NW-A Attending Trevor Oden MD   Hosp Day # 2 PCP [facility]     Chief Complaint: Syncope    Subjective:   Patient denies chest pain or shortness of breath. No change in respiratory status.     Current medications:   docusate sodium  100 mg Oral BID    polyethylene glycol (PEG 3350)  17 g Oral Daily    bumetanide  1 mg Intravenous BID (Diuretic)    ampicillin-sulbactam  3 g Intravenous q6h    amiodarone  200 mg Oral QPM    aspirin  81 mg Oral Daily    pantoprazole  40 mg Oral BID AC    rosuvastatin  10 mg Oral Daily    tamsulosin  0.4 mg Oral QPM    doxycycline  100 mg Oral BID    enoxaparin  40 mg Subcutaneous Daily       Objective:    Review of Systems:   10 point ROS completed and was negative, except for pertinent positive and negatives stated in subjective.    Vital signs:  Temp:  [97.9 °F (36.6 °C)-99.1 °F (37.3 °C)] 97.9 °F (36.6 °C)  Pulse:  [61-73] 62  Resp:  [18-22] 19  BP: (105-129)/(53-69) 129/58  SpO2:  [89 %-97 %] 96 %  Patient Weight for the past 72 hrs:   Weight   25 1733 140 lb (63.5 kg)   25 2133 150 lb 8 oz (68.3 kg)   25 2141 150 lb (68 kg)     Physical Exam:    General: No acute distress.   Respiratory: Crackles bilaterally, mild wheeze, increased aeration   Cardiovascular: S1, S2.   Abdomen: Soft, nontender, nondistended.  Positive bowel sounds.  Extremities: + edema - improving   Neuro: Alert and awake    Diagnostic Data:    Labs:  Recent Labs   Lab 25  0750 25  0614   WBC 12.4* 11.6* 10.2   HGB 13.1 12.1* 11.8*   MCV 83.5 82.9 83.7   .0 371.0 355.0   INR 1.25*  --   --        Recent Labs   Lab 25  0750 25  0614   * 121* 95   BUN 16 12 15   CREATSERUM 0.72 0.70 0.79   CA 8.7 8.5* 8.4*   ALB 3.6 3.5  --    * 134* 137   K 4.7  4.7 4.0   CL 96* 100 101   CO2 28.0 23.0 24.0   ALKPHO 87 82  --    AST 30 28  --    ALT 30 29  --    BILT 0.6 0.6  --    TP 6.5 6.2  --        Estimated Creatinine Clearance: 47.1 mL/min (based on SCr of 0.79 mg/dL).    Recent Labs   Lab 06/01/25  1740   PTP 15.8*   INR 1.25*            COVID-19 Lab Results    COVID-19  Lab Results   Component Value Date    COVID19 Not Detected 06/01/2025    COVID19 Detected (A) 02/18/2025       Pro-Calcitonin  Recent Labs   Lab 06/01/25  1740   PCT 0.13*       Cardiac  Recent Labs   Lab 06/01/25  1740   PBNP 5,393*       Creatinine Kinase  No results for input(s): \"CK\" in the last 168 hours.    Inflammatory Markers  No results for input(s): \"CRP\", \"TOSHA\", \"LDH\", \"DDIMER\" in the last 168 hours.    Recent Labs   Lab 06/01/25  1740   TROPHS 23       Imaging: Imaging data reviewed in Epic.    Medications:    docusate sodium  100 mg Oral BID    polyethylene glycol (PEG 3350)  17 g Oral Daily    bumetanide  1 mg Intravenous BID (Diuretic)    ampicillin-sulbactam  3 g Intravenous q6h    amiodarone  200 mg Oral QPM    aspirin  81 mg Oral Daily    pantoprazole  40 mg Oral BID AC    rosuvastatin  10 mg Oral Daily    tamsulosin  0.4 mg Oral QPM    doxycycline  100 mg Oral BID    enoxaparin  40 mg Subcutaneous Daily       Assessment & Plan:    Syncope, CTb neg, concern for arrhythmia given depressed EF, EF now 25-30%  Aortic stenosis, mild-moderate   Telemetry   Acute hypoxic respiratory failure d/t possible pneumonia though suspect more acute on chronic heart failure, RVP neg, PCT  IV abx - short course   IV diuretics   Oxygen, wean as able - currently 3L   Incentive spirometry  Increase activity, OOB to chair  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 - only taking Bumex as needed PTA  Bumex IV  Daily weight   Monitor I/O - inaccurate   Cardiology consult   Atrial fibrillation, not on anticoagulation  Amiodarone  Telemetry   Hypotension on arrival,  resolved   CAD sp PCI  Essential hypertension  Dyslipidemia  Aspirin  Crestor  PTA antihypertensives on hold  Monitor hemodynamics   BPH  Flomax  Constipation  Bowel care   Leukocytosis,improving    Hyponatremia, improving  Hypercoagulable state, history of post-op DVT/PE off Eliquis  Cerebrovascular disease   Carotid stenosis sp stent  Hearing impairment     DVT Px: Lovenox     At this point Mr. Mijares is expected to be discharge to: Home    Plan of care discussed with patient and RN.    Trevor Oden MD        Supplementary Documentation:   DVT Mechanical Prophylaxis:   SCDs, Early ambuation  DVT Pharmacologic Prophylaxis   Medication    enoxaparin (Lovenox) 40 MG/0.4ML SUBQ injection 40 mg      DVT Pharmacologic prophylaxis: Aspirin 162 mg         Code Status: Prior  Jennings: No urinary catheter in place  Jennings Duration (in days):   Central line:    ELIN:

## 2025-06-03 NOTE — OCCUPATIONAL THERAPY NOTE
OCCUPATIONAL THERAPY EVALUATION - INPATIENT     Room Number: 525/525-A  Evaluation Date: 6/3/2025  Type of Evaluation: Initial  Presenting Problem: Acute resp failure    Physician Order: IP Consult to Occupational Therapy  Reason for Therapy: ADL/IADL Dysfunction and Discharge Planning    OCCUPATIONAL THERAPY ASSESSMENT   Patient is currently functioning near baseline with toileting, bathing, lower body dressing, bed mobility, transfers, and functional standing tolerance. Prior to admission, patient's baseline is mod indep.  Patient is requiring CGA as a result of the following impairments: decreased endurance, impaired standing balance, medical status, and decreased safety awareness. Occupational Therapy will continue to follow for duration of hospitalization.    Patient will benefit from continued skilled OT Services at discharge to promote prior level of function and safety with additional support and return home with home health OT    History Related to Current Admission: Patient is a 89 year old male admitted on 6/1/2025 with Presenting Problem: Acute resp failure. Co-Morbidities : PE, MI, HTN, stroke, deaf, cardiac stents    WEIGHT BEARING RESTRICTION       Recommendations for nursing staff:   Transfers: x1 with walker  Toileting location: bathroom    EVALUATION SESSION:  Patient Start of Session: bed    FUNCTIONAL TRANSFER ASSESSMENT  Sit to Stand: Edge of Bed  Edge of Bed: Contact Guard Assist  Toilet Transfer: Contact Guard Assist    BED MOBILITY  Supine to Sit : Stand-by Assist    BALANCE ASSESSMENT     FUNCTIONAL ADL ASSESSMENT  Grooming Standing: Contact Guard Assist  LB Dressing Seated: Contact Guard Assist  LB Dressing Standing: Contact Guard Assist  Toileting Seated: Contact Guard Assist  Toileting Standing: Contact Guard Assist    ACTIVITY TOLERANCE: fair                         O2 SATURATIONS       COGNITION  Overall Cognitive Status:  WFL - within functional limits    Upper Extremity   ROM: within  functional limits   Strength: within functional limits   Coordination  Gross motor: intact  Fine motor: intact  Sensation: Light touch:  intact    EDUCATION PROVIDED  Patient Education : Role of Occupational Therapy; Plan of Care; Functional Transfer Techniques; Proper Body Mechanics; Energy Conservation; Discharge Recommendations; Fall Prevention; Posture/Positioning  Patient's Response to Education: Requires Further Education    Equipment used: walker  Demonstrates functional use, Would benefit from additional trial      Therapist comments: Pt SBA for bed mobility, CGA sit to stand tx, ambulation to bathroom with walker. Pt requires cues for hand placement during transfers. CGA for toileting and LB dressing tasks in bathroom, CGA standing grooming at sink. Pt ambulates in hallway CGA and returns to bedside chair in room. Pt able to hear deep voices well, responds appropriately to questions.     Patient End of Session: Up in chair, Needs met, Call light within reach, RN aware of session/findings, All patient questions and concerns addressed, Hospital anti-slip socks, Alarm set    OCCUPATIONAL PROFILE    HOME SITUATION  Type of Home: House  Home Layout: One level (with basement)  Lives With: Spouse    Toilet and Equipment: Standard height toilet  Shower/Tub and Equipment: Walk-in shower, Shower chair, Grab bar             Drives: Yes  Patient Regularly Uses: Hearing aides, Cane    Prior Level of Function: mod indep    SUBJECTIVE   Pleasant and cooperative  \"Not bad for my age\"    PAIN ASSESSMENT  Ratin          OBJECTIVE  Precautions: Bed/chair alarm, Hard of hearing  Fall Risk: High fall risk    ASSESSMENTS    AM-PAC ‘6-Clicks’ Inpatient Daily Activity Short Form  -   Putting on and taking off regular lower body clothing?: A Little  -   Bathing (including washing, rinsing, drying)?: A Little  -   Toileting, which includes using toilet, bedpan or urinal? : A Little  -   Putting on and taking off regular upper  body clothing?: A Little  -   Taking care of personal grooming such as brushing teeth?: A Little  -   Eating meals?: None    AM-PAC Score:  Score: 19  Approx Degree of Impairment: 42.8%  Standardized Score (AM-PAC Scale): 40.22    ADDITIONAL TESTS     NEUROLOGICAL FINDINGS      COGNITION ASSESSMENTS     PLAN     OT Treatment Plan: Balance activities, ADL training, Energy conservation/work simplification techniques, Functional transfer training, Patient/Family education, Patient/Family training, Endurance training, Compensatory technique education  Rehab Potential : Good  Frequency: 3x/week  Number of Visits to Meet Established Goals: 3    ADL Goals   Patient will perform lower body dressing:  with supervision  Patient will perform toileting: with supervision    Functional Transfer Goals  Patient will transfer to toilet:  with supervision    Patient Evaluation Complexity Level:   Occupational Profile/Medical History LOW - Brief history including review of medical or therapy records    Specific performance deficits impacting engagement in ADL/IADL LOW  1 - 3 performance deficits    Client Assessment/Performance Deficits LOW - No comorbidities nor modifications of tasks    Clinical Decision Making LOW - Analysis of occupational profile, problem-focused assessments, limited treatment options    Overall Complexity LOW     OT Session Time: 32 minutes  Self-Care Home Management: 13 minutes  Therapeutic Activity: 10 minutes  Neuromuscular Re-education:  minutes  Therapeutic Exercise:  minutes  Cognitive Skills:  minutes  Sensory Integrative:  minutes  Orthotic Management and Training:  minutes  Can add/delete any of these

## 2025-06-04 LAB
ANION GAP SERPL CALC-SCNC: 12 MMOL/L (ref 0–18)
BUN BLD-MCNC: 22 MG/DL (ref 9–23)
CALCIUM BLD-MCNC: 8.2 MG/DL (ref 8.7–10.6)
CHLORIDE SERPL-SCNC: 98 MMOL/L (ref 98–112)
CO2 SERPL-SCNC: 31 MMOL/L (ref 21–32)
CREAT BLD-MCNC: 0.93 MG/DL (ref 0.7–1.3)
EGFRCR SERPLBLD CKD-EPI 2021: 74 ML/MIN/1.73M2 (ref 60–?)
GLUCOSE BLD-MCNC: 106 MG/DL (ref 70–99)
MAGNESIUM SERPL-MCNC: 2.1 MG/DL (ref 1.6–2.6)
OSMOLALITY SERPL CALC.SUM OF ELEC: 296 MOSM/KG (ref 275–295)
POTASSIUM SERPL-SCNC: 3.1 MMOL/L (ref 3.5–5.1)
SODIUM SERPL-SCNC: 141 MMOL/L (ref 136–145)

## 2025-06-04 PROCEDURE — 99232 SBSQ HOSP IP/OBS MODERATE 35: CPT | Performed by: HOSPITALIST

## 2025-06-04 RX ORDER — POTASSIUM CHLORIDE 1500 MG/1
40 TABLET, EXTENDED RELEASE ORAL ONCE
Status: COMPLETED | OUTPATIENT
Start: 2025-06-04 | End: 2025-06-04

## 2025-06-04 RX ORDER — POLYETHYLENE GLYCOL 3350 17 G/17G
17 POWDER, FOR SOLUTION ORAL DAILY PRN
Status: DISCONTINUED | OUTPATIENT
Start: 2025-06-04 | End: 2025-06-04

## 2025-06-04 RX ORDER — BUMETANIDE 0.25 MG/ML
2 INJECTION, SOLUTION INTRAMUSCULAR; INTRAVENOUS
Status: DISCONTINUED | OUTPATIENT
Start: 2025-06-04 | End: 2025-06-06

## 2025-06-04 RX ORDER — DOCUSATE SODIUM 100 MG/1
100 CAPSULE, LIQUID FILLED ORAL 2 TIMES DAILY
Status: DISCONTINUED | OUTPATIENT
Start: 2025-06-04 | End: 2025-06-08

## 2025-06-04 RX ORDER — POLYETHYLENE GLYCOL 3350 17 G/17G
17 POWDER, FOR SOLUTION ORAL DAILY
Status: DISCONTINUED | OUTPATIENT
Start: 2025-06-04 | End: 2025-06-08

## 2025-06-04 NOTE — PROGRESS NOTES
[facility]   part of [facility]     Hospitalist Progress Note     Beba Mijares Patient Status:  Inpatient    1926 MRN [id number]   Location [facility] 5NW-A Attending Trevor Oden MD   Hosp Day # 3 PCP CLARK HIGGINS     Chief Complaint: Syncope    Subjective:   Patient feels well. No complaints. Oxygen weaned during visit. +UOP  Small BM.    Current medications:   polyethylene glycol (PEG 3350)  17 g Oral Daily    docusate sodium  100 mg Oral BID    bumetanide  2 mg Intravenous BID (Diuretic)    ampicillin-sulbactam  3 g Intravenous q6h    amiodarone  200 mg Oral QPM    aspirin  81 mg Oral Daily    pantoprazole  40 mg Oral BID AC    rosuvastatin  10 mg Oral Daily    tamsulosin  0.4 mg Oral QPM    enoxaparin  40 mg Subcutaneous Daily       Objective:    Review of Systems:   10 point ROS completed and was negative, except for pertinent positive and negatives stated in subjective.    Vital signs:  Temp:  [97.6 °F (36.4 °C)-98.6 °F (37 °C)] 98.6 °F (37 °C)  Pulse:  [58-69] 69  Resp:  [16-18] 16  BP: (115-123)/(59-69) 120/60  SpO2:  [86 %-96 %] 94 %  Patient Weight for the past 72 hrs:   Weight   25 1733 140 lb (63.5 kg)   25 2133 150 lb 8 oz (68.3 kg)   25 2141 150 lb (68 kg)     Physical Exam:    General: No acute distress.   Respiratory: Clear, increased air exchange   Cardiovascular: S1, S2.   Abdomen: Soft, nontender, nondistended.  Positive bowel sounds.  Extremities: + edema - improving - trace remaining   Neuro: Alert and awake    Diagnostic Data:    Labs:  Recent Labs   Lab 25  0750 25  0614   WBC 12.4* 11.6* 10.2   HGB 13.1 12.1* 11.8*   MCV 83.5 82.9 83.7   .0 371.0 355.0   INR 1.25*  --   --        Recent Labs   Lab 25  1740 25  0750 25  0614 25  0708   * 121* 95 106*   BUN 16 12 15 22   CREATSERUM 0.72 0.70 0.79 0.93   CA 8.7 8.5* 8.4* 8.2*   ALB 3.6 3.5  --   --    * 134* 137 141   K 4.7 4.7  4.0 3.1*   CL 96* 100 101 98   CO2 28.0 23.0 24.0 31.0   ALKPHO 87 82  --   --    AST 30 28  --   --    ALT 30 29  --   --    BILT 0.6 0.6  --   --    TP 6.5 6.2  --   --        Estimated Creatinine Clearance: 39.5 mL/min (based on SCr of 0.93 mg/dL).    Recent Labs   Lab 06/01/25  1740   PTP 15.8*   INR 1.25*            COVID-19 Lab Results    COVID-19  Lab Results   Component Value Date    COVID19 Not Detected 06/01/2025    COVID19 Detected (A) 02/18/2025       Pro-Calcitonin  Recent Labs   Lab 06/01/25  1740   PCT 0.13*       Cardiac  Recent Labs   Lab 06/01/25  1740   PBNP 5,393*       Creatinine Kinase  No results for input(s): \"CK\" in the last 168 hours.    Inflammatory Markers  No results for input(s): \"CRP\", \"TOSHA\", \"LDH\", \"DDIMER\" in the last 168 hours.    Recent Labs   Lab 06/01/25  1740   TROPHS 23       Imaging: Imaging data reviewed in Epic.    Medications:    polyethylene glycol (PEG 3350)  17 g Oral Daily    docusate sodium  100 mg Oral BID    bumetanide  2 mg Intravenous BID (Diuretic)    ampicillin-sulbactam  3 g Intravenous q6h    amiodarone  200 mg Oral QPM    aspirin  81 mg Oral Daily    pantoprazole  40 mg Oral BID AC    rosuvastatin  10 mg Oral Daily    tamsulosin  0.4 mg Oral QPM    enoxaparin  40 mg Subcutaneous Daily       Assessment & Plan:    Syncope, CTb neg, concern for arrhythmia given depressed EF, EF now 25-30%  Aortic stenosis, mild-moderate   Telemetry   Acute hypoxic respiratory failure d/t possible pneumonia though suspect more acute on chronic heart failure, RVP neg, PCT  IV abx - short course   IV diuretics   Oxygen, wean as able - currently room air   Incentive spirometry  Increase activity, OOB to chair  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  Daily weight   Monitor I/O   Cardiology consult   Atrial fibrillation, not on  anticoagulation  Amiodarone  Telemetry   Hypotension on arrival, resolved   CAD sp PCI  Essential hypertension  Dyslipidemia  Aspirin  Crestor  PTA antihypertensives on hold  Monitor hemodynamics   BPH  Flomax  Constipation  Bowel care   Leukocytosis,improving    Hyponatremia, improving  Hypercoagulable state, history of post-op DVT/PE off Eliquis  Cerebrovascular disease   Carotid stenosis sp stent  Hearing impairment     DVT Px: Lovenox     Discharge planning.     Plan of care discussed with patient, RN, SW and cardiology APN.    Trevor Oden MD        Supplementary Documentation:   DVT Mechanical Prophylaxis:   SCDs, Early ambuation  DVT Pharmacologic Prophylaxis   Medication    enoxaparin (Lovenox) 40 MG/0.4ML SUBQ injection 40 mg      DVT Pharmacologic prophylaxis: Aspirin 162 mg         Code Status: Prior  Jennings: No urinary catheter in place  Jennings Duration (in days):   Central line:    ELIN:

## 2025-06-04 NOTE — PLAN OF CARE
Pt is A&Ox3-4, forgetful. Wears glasses, [place]- b/l HA. VSS, afebrile. SPO2 maintained on 1L, RA-BL. 2L at NOC. Tele, NSR/SB. EP. DW. Strict I&Os. Lovenox. IV bumex. Last BM 6/3. Cardiac diet, thins okay/small bites. Voids/urinal. Up SB walker. Denies pain. PIV, IV ABX. No further needs at this time, continue POC. Safety precautions in place.     Problem: Patient/Family Goals  Goal: Patient/Family Long Term Goal  Description: Patient's Long Term Goal: discharge to home    Interventions:  - Follow POC  - See additional Care Plan goals for specific interventions  Outcome: Progressing  Goal: Patient/Family Short Term Goal  Description: Patient's Short Term Goal: 6/1NOC:rest,safety  6/3 NOC: sleep    Interventions:   - cluster care  - See additional Care Plan goals for specific interventions  Outcome: Progressing

## 2025-06-04 NOTE — PHYSICAL THERAPY NOTE
PHYSICAL THERAPY TREATMENT NOTE - INPATIENT    Room Number: 525/525-A     Session: 1     Number of Visits to Meet Established Goals: 4    Presenting Problem: acute hypoxemic respiratory failure, hypotension, PNA  Co-Morbidities : PE, MI, HTN, stroke, deaf, cardiac stents    PHYSICAL THERAPY ASSESSMENT   Patient demonstrates good  progress this session, goals  progressing with 1/3 met this session.      Patient is requiring supervision and contact guard assist as a result of the following impairments: decreased functional strength, decreased endurance/aerobic capacity, impaired dynamic balance, decreased muscular endurance, and increased O2 needs from baseline.     Patient continues to function below baseline with transfers and gait.  Next session anticipate patient to progress transfers and gait.  Physical Therapy will continue to follow patient for duration of hospitalization.    Patient continues to benefit from continued skilled PT services: at discharge to promote prior level of function.  Anticipate patient will return home with home health PT.    PLAN DURING HOSPITALIZATION  Nursing Mobility Recommendation : 1 Assist  PT Device Recommendation: Rolling walker  PT Treatment Plan: Bed mobility, Endurance, Energy conservation, Patient education, Gait training, Family education, Strengthening, Transfer training, Balance training  Frequency (Obs): 3-5x/week     CURRENT GOALS     Goal #1 Patient is able to demonstrate supine - sit EOB @ level: supervision   MET   Goal #2 Patient is able to demonstrate transfers Sit to/from Stand at assistance level: supervision   ONGOING   Goal #3 Patient is able to ambulate 150 feet with assist device: walker - rolling at assistance level: supervision   ONGOING   Goal #4     Goal #5     Goal #6     Goal Comments: Goals established on 6/2/2025    SUBJECTIVE  \"Let me put my hearing aids in\" \"I'm feeling ok walking\"    OBJECTIVE  Precautions: Bed/chair alarm, Hard of hearing    WEIGHT  BEARING RESTRICTION     PAIN ASSESSMENT   Ratin          BALANCE                                                                                                                       Static Sitting: Fair  Dynamic Sitting: Fair -           Static Standing: Fair -  Dynamic Standing: Fair -    ACTIVITY TOLERANCE                         O2 WALK  Oxygen Therapy  SPO2% on Room Air at Rest: 94    AM-PAC '6-Clicks' INPATIENT SHORT FORM - BASIC MOBILITY  How much difficulty does the patient currently have...  Patient Difficulty: Turning over in bed (including adjusting bedclothes, sheets and blankets)?: None   Patient Difficulty: Sitting down on and standing up from a chair with arms (e.g., wheelchair, bedside commode, etc.): A Little   Patient Difficulty: Moving from lying on back to sitting on the side of the bed?: A Little   How much help from another person does the patient currently need...   Help from Another: Moving to and from a bed to a chair (including a wheelchair)?: A Little   Help from Another: Need to walk in hospital room?: A Little   Help from Another: Climbing 3-5 steps with a railing?: A Little     AM-PAC Score:  Raw Score: 19   Approx Degree of Impairment: 41.77%   Standardized Score (AM-PAC Scale): 45.44   CMS Modifier (G-Code): CK    FUNCTIONAL ABILITY STATUS  Gait Assessment   Functional Mobility/Gait Assessment  Gait Assistance: Contact guard assist  Distance (ft): 150  Assistive Device: Rolling walker  Pattern: Shuffle    Skilled Therapy Provided    Bed Mobility:  Rolling: Sup   Supine<>Sit: Sup   Sit<>Supine: NT     Transfer Mobility:  Sit<>Stand: CGA   Stand<>Sit: CGA   Gait: CGA for ~150ft with rolling walker.    Therapist's Comments: Pt agreeable to be OOB and ambulating with PT. Pt requires cues to stay within the walker during ambulation and transfers. Did not require rest breaks during ambulation. Pt performs stand to sit with poor eccentric control. Pt educated to be OOB with nursing staff  assistance.       THERAPEUTIC EXERCISES  Lower Extremity Alternating marching  Hip AB/AD  Knee extension     Upper Extremity Elbow flex/ext, OH Reaching, and Scapular Retraction  Shoulder Shrugs     Position Sitting     Repetitions   5   Sets   2     Patient End of Session: Up in chair, Needs met, Call light within reach, RN aware of session/findings, All patient questions and concerns addressed, Hospital anti-slip socks, Alarm set    PT Session Time: 25 minutes  Gait Training: 15 minutes  Therapeutic Exercise: 10 minutes

## 2025-06-04 NOTE — PLAN OF CARE
Patient AAOx3-4, forgetful to time. Glasses, b/l hearing aids. 2L NC, attempted RA, desatting. Tele NSR/SB. Lovenox. Voids/urinal. Reports constipation, miralax/colace given. Redness between buttocks, barrier cream applied. Up stand by with walker. Walked in hallway with PT. Sitting up in chair. Daily weight, strict I/O. IV bumex. Cardiac diet, pills one/time. Potassium replaced per protocol. Patient rounded on routinely. Patient updated on plan of care.      Problem: Patient/Family Goals  Goal: Patient/Family Long Term Goal  Description: Patient's Long Term Goal: discharge to home    Interventions:  - Follow POC  - See additional Care Plan goals for specific interventions  Outcome: Progressing  Goal: Patient/Family Short Term Goal  Description: Patient's Short Term Goal: 6/1NOC:rest,safety  6/3 NOC: sleep    Interventions:   - cluster care  - See additional Care Plan goals for specific interventions  Outcome: Progressing

## 2025-06-04 NOTE — PAYOR COMM NOTE
--------------  CONTINUED STAY REVIEW for 6/4    Payor: [employer]  Subscriber #:  [id number]  Authorization Number: [id number]    Admit date: 6/1/25  Admit time:  9:22 PM    REVIEW DOCUMENTATION:          Date of Service: 6/4/2025 10:17 AM     Signed           Progress Note     Subjective:  Resting comfortably.  States his breathing is improving.  No acute overnight events.     Objective:  /59 (BP Location: Right arm)   Pulse 58   Temp 98 °F (36.7 °C) (Oral)   Resp 18   Ht 5' 6\" (1.676 m)   Wt 138 lb 10.7 oz (62.9 kg)   SpO2 95%   BMI 22.38 kg/m²         Intake/Output:     Intake/Output Summary (Last 24 hours) at 6/4/2025 1017  Last data filed at 6/4/2025 0300      Gross per 24 hour   Intake --   Output 2865 ml   Net -2865 ml              Last 3 Weights   06/04/25 0813 138 lb 10.7 oz (62.9 kg)   06/03/25 0640 145 lb 4.5 oz (65.9 kg)   06/01/25 2141 150 lb (68 kg)   06/01/25 2133 150 lb 8 oz (68.3 kg)   06/01/25 1733 140 lb (63.5 kg)   02/16/25 0644 140 lb (63.5 kg)   02/16/25 0442 140 lb 3.2 oz (63.6 kg)   02/15/25 1850 100 lb (45.4 kg)   06/04/24 0221 142 lb 3.2 oz (64.5 kg)   06/03/24 2218 140 lb (63.5 kg)         Labs:        Recent Labs   Lab 06/02/25  0750 06/03/25  0614 06/04/25  0708   * 95 106*   BUN 12 15 22   CREATSERUM 0.70 0.79 0.93   EGFRCR 83 80 74   CA 8.5* 8.4* 8.2*   * 137 141   K 4.7 4.0 3.1*    101 98   CO2 23.0 24.0 31.0            Recent Labs   Lab 06/01/25  1740 06/02/25  0750 06/03/25  0614   RBC 4.61 4.33 4.18   HGB 13.1 12.1* 11.8*   HCT 38.5* 35.9* 35.0*   MCV 83.5 82.9 83.7   MCH 28.4 27.9 28.2   MCHC 34.0 33.7 33.7   RDW 16.0 16.1 16.3   NEPRELIM 9.48*  --  7.68   WBC 12.4* 11.6* 10.2   .0 371.0 355.0                Recent Labs   Lab 06/01/25  1740   TROPHS 23         ROS     Physical Exam:    Gen: alert, NAD  Heent: pupils equal, reactive. Mucous membranes moist.   Neck: no jvd  Cardiac: regular rate and rhythm, normal S1,S2  Lungs:  CTA, dim bases  Abd: soft, NT/ND +bs  Ext: no edema  Skin: Warm, dry  Neuro: No focal deficits           Medications:     [Scheduled Medications]    [Scheduled Medications]   polyethylene glycol (PEG 3350)  17 g Oral Daily    docusate sodium  100 mg Oral BID    bumetanide  2 mg Intravenous TID    ampicillin-sulbactam  3 g Intravenous q6h    amiodarone  200 mg Oral QPM    aspirin  81 mg Oral Daily    pantoprazole  40 mg Oral BID AC    rosuvastatin  10 mg Oral Daily    tamsulosin  0.4 mg Oral QPM    enoxaparin  40 mg Subcutaneous Daily         Assessment:  Fall, possible syncope.   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 As outlined no plans for aggressive approach to CAD  Conservative GDMT given advanced age, frailty, orthostasis  CAD with prior PCI  Hx CVA  PAF on amio, maintaining NSR  Historically not on AC given elevated risk.   MCT 3/2025 with 0% afib burden  HL-statin     Plan:  Excellent UO with IV diuretics. Overall negative 4.5L since admit. Reduce dosing today. Trend probnp in am.    May consider addition of acei/arb pending bp after diuresis. No bb with bradycardia.   Abx per primary     Plan of care discussed with patient, RN, Dr. Oden, Dr. Peterson.      Miriam Kramer, APRN  6/4/2025  10:17 AM          Patient seen and examined independently.  Note reviewed and labs reviewed. Agree with above assessment and plan.  89-year-old male who presented with possible syncopal episode.  Noted to have acute hypoxic respiratory failure with imaging notable for potential pulmonary edema.  Noted to have acute on chronic biventricular HFrEF with an LVEF of 25-30% with mild to moderate aortic valve stenosis.  Suspected LAD infarct based on 1 wall motion abnormalities.  Conservative CAD  management given his age.  Currently, we will plan volume optimization.  Decrease IV Bumex dosing.  Will optimize medical therapy thereafter.  Will follow.           Puma Peterson DO  Cardiologist  [facility]  6/4/2025 11:50 AM        Note to the patient: The 21st Century Cures Act makes medical notes like these available to patients in the interest of transparency. However, be advised that this is a medical document. It is intended as peer to peer communication. It is written in medical language and may contain abbreviations or verbiage that are unfamiliar. It may appear blunt or direct. Medical documents are intended to carry relevant information, facts as evident, and clinical opinion of the practitioner.      Disclaimer: Components of this note were documented using voice recognition system and are subject to errors not corrected at proofreading. Contact the author of this note for any clarifications.                         MEDICATIONS ADMINISTERED IN LAST 1 DAY:  acetaminophen (Tylenol Extra Strength) tab 500 mg       Date Action Dose Route User    6/4/2025 0522 Given 500 mg Oral Sandy Lara RN          amiodarone (Pacerone) tab 200 mg       Date Action Dose Route User    6/3/2025 1829 Given 200 mg Oral Edita Zamora RN          ampicillin-sulbactam (Unasyn) 3 g in sodium chloride 0.9% 100mL IVPB-ADD       Date Action Dose Route User    6/4/2025 0850 New Bag 3 g Intravenous Toma Anand RN    6/4/2025 0217 New Bag 3 g Intravenous Sandy Lara RN    6/3/2025 2107 New Bag 3 g Intravenous Sandy Lara RN    6/3/2025 1539 New Bag 3 g Intravenous Edita Zamora RN          aspirin chewable tab 81 mg       Date Action Dose Route User    6/4/2025 0850 Given 81 mg Oral Toma Anand RN          bumetanide (Bumex) 0.25 MG/ML injection 2 mg       Date Action Dose Route User    6/4/2025 1012 Given 2 mg Intravenous Toma Anand RN    6/3/2025 2108 Given 2 mg Intravenous Marco  SHANTA Delgado    6/3/2025 1540 Given 2 mg Intravenous Edita Zamora RN          docusate sodium (Colace) cap 100 mg       Date Action Dose Route User    6/4/2025 1012 Given 100 mg Oral Toma Anand RN          doxycycline (Vibramycin) cap 100 mg       Date Action Dose Route User    6/4/2025 0850 Given 100 mg Oral Toma Anand RN    6/3/2025 2106 Given 100 mg Oral Sandy Lara RN          enoxaparin (Lovenox) 40 MG/0.4ML SUBQ injection 40 mg       Date Action Dose Route User    6/4/2025 0850 Given 40 mg Subcutaneous (Right Lower Abdomen) Toma Anand RN          pantoprazole (Protonix) DR tab 40 mg       Date Action Dose Route User    6/4/2025 0517 Given 40 mg Oral Sandy Lara RN    6/3/2025 1829 Given 40 mg Oral Edita Zamora RN          polyethylene glycol (PEG 3350) (Miralax) 17 g oral packet 17 g       Date Action Dose Route User    6/4/2025 1012 Given 17 g Oral Toma Anand RN          potassium chloride (Klor-Con M20) tab 40 mEq       Date Action Dose Route User    6/4/2025 0850 Given 40 mEq Oral Toma Anand RN          rosuvastatin (Crestor) tab 10 mg       Date Action Dose Route User    6/4/2025 0850 Given 10 mg Oral Toma Anand RN          tamsulosin (Flomax) cap 0.4 mg       Date Action Dose Route User    6/3/2025 1829 Given 0.4 mg Oral Edita Zamora RN            Vitals (last day)       Date/Time Temp Pulse Resp BP SpO2 Weight O2 Device O2 Flow Rate (L/min) [facility]    06/04/25 1414 -- 69 -- -- 94 % -- -- --     06/04/25 1218 98.6 °F (37 °C) 62 16 120/60 95 % -- Nasal cannula 2 L/min     06/04/25 1200 -- 63 -- -- 93 % -- -- --     06/04/25 1042 -- 59 -- -- 91 % -- Nasal cannula 2 L/min     06/04/25 1037 -- 63 -- -- 86 % -- None (Room air) --     06/04/25 0813 98 °F (36.7 °C) 58 18 115/59 95 % 138 lb 10.7 oz (62.9 kg) Nasal cannula 2 L/min CD    06/04/25 0443 98.6 °F (37 °C) -- 18 -- -- -- Nasal cannula 2 L/min XIANG    06/04/25 0443 -- 60 -- 119/60 95 % -- -- -- TC    06/04/25 0010 98 °F  (36.7 °C) -- 18 -- -- -- Nasal cannula 2 L/min XIANG    06/03/25 2303 -- 64 -- -- 92 % -- Nasal cannula 2 L/min TC    06/03/25 2115 -- 61 -- -- 95 % -- Nasal cannula 1 L/min TC    06/03/25 2016 98.5 °F (36.9 °C) -- 18 -- -- -- Nasal cannula 2 L/min XIANG    06/03/25 1853 -- -- -- -- 96 % -- Nasal cannula 2 L/min LR    06/03/25 1549 -- -- -- -- 93 % -- None (Room air) -- LR    06/03/25 1541 97.6 °F (36.4 °C) 64 18 123/69 95 % -- Nasal cannula 1 L/min LR    06/03/25 1403 -- -- -- 118/62 97 % -- -- -- LR    06/03/25 0900 -- -- -- -- 96 % -- Nasal cannula 2 L/min SB    06/03/25 0759 97.9 °F (36.6 °C) 62 19 129/58 94 % -- Nasal cannula 2 L/min TJ    06/03/25 0640 -- 73 -- -- 89 % 145 lb 4.5 oz (65.9 kg) -- -- ND    06/03/25 0601 -- -- -- -- -- -- Nasal cannula 2 L/min AC    06/03/25 0420 98.4 °F (36.9 °C) 66 20 119/59 95 % -- -- -- ND          CIWA Scores (since admission)       None

## 2025-06-04 NOTE — PROGRESS NOTES
Progress Note  Beba Mijares Patient Status:  Inpatient    1926 MRN [id number]   Location [facility] 5NW-A Attending Trevor Oden MD   Hosp Day # 3 PCP CLARK HIGGINS     Subjective:  Resting comfortably.  States his breathing is improving.  No acute overnight events.    Objective:  /59 (BP Location: Right arm)   Pulse 58   Temp 98 °F (36.7 °C) (Oral)   Resp 18   Ht 5' 6\" (1.676 m)   Wt 138 lb 10.7 oz (62.9 kg)   SpO2 95%   BMI 22.38 kg/m²       Intake/Output:    Intake/Output Summary (Last 24 hours) at 2025 1017  Last data filed at 2025 0300  Gross per 24 hour   Intake --   Output 2865 ml   Net -2865 ml       Last 3 Weights   25 0813 138 lb 10.7 oz (62.9 kg)   25 0640 145 lb 4.5 oz (65.9 kg)   25 2141 150 lb (68 kg)   25 2133 150 lb 8 oz (68.3 kg)   25 1733 140 lb (63.5 kg)   25 0644 140 lb (63.5 kg)   25 0442 140 lb 3.2 oz (63.6 kg)   02/15/25 1850 100 lb (45.4 kg)   24 0221 142 lb 3.2 oz (64.5 kg)   24 2218 140 lb (63.5 kg)       Labs:  Recent Labs   Lab 25  0750 25  0614 25  0708   * 95 106*   BUN 12 15 22   CREATSERUM 0.70 0.79 0.93   EGFRCR 83 80 74   CA 8.5* 8.4* 8.2*   * 137 141   K 4.7 4.0 3.1*    101 98   CO2 23.0 24.0 31.0     Recent Labs   Lab 25  1740 25  0750 25  0614   RBC 4.61 4.33 4.18   HGB 13.1 12.1* 11.8*   HCT 38.5* 35.9* 35.0*   MCV 83.5 82.9 83.7   MCH 28.4 27.9 28.2   MCHC 34.0 33.7 33.7   RDW 16.0 16.1 16.3   NEPRELIM 9.48*  --  7.68   WBC 12.4* 11.6* 10.2   .0 371.0 355.0         Recent Labs   Lab 25  1740   TROPHS 23       ROS    Physical Exam:    Gen: alert, NAD  Heent: pupils equal, reactive. Mucous membranes moist.   Neck: no jvd  Cardiac: regular rate and rhythm, normal S1,S2  Lungs: CTA, dim bases  Abd: soft, NT/ND +bs  Ext: no edema  Skin: Warm, dry  Neuro: No focal deficits        Medications:    Scheduled  Medications[1]  Medication Infusions[2]        Assessment:  Fall, possible syncope.   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 As outlined no plans for aggressive approach to CAD  Conservative GDMT given advanced age, frailty, orthostasis  CAD with prior PCI  Hx CVA  PAF on amio, maintaining NSR  Historically not on AC given elevated risk.   MCT 3/2025 with 0% afib burden  HL-statin    Plan:  Excellent UO with IV diuretics. Overall negative 4.5L since admit. Reduce dosing today. Trend probnp in am.    May consider addition of acei/arb pending bp after diuresis. No bb with bradycardia.   Abx per primary    Plan of care discussed with patient, RN, Dr. Oden, Dr. Peterson.     Miriam Kramer, ABDIEL  6/4/2025  10:17 AM        Patient seen and examined independently.  Note reviewed and labs reviewed. Agree with above assessment and plan.  89-year-old male who presented with possible syncopal episode.  Noted to have acute hypoxic respiratory failure with imaging notable for potential pulmonary edema.  Noted to have acute on chronic biventricular HFrEF with an LVEF of 25-30% with mild to moderate aortic valve stenosis.  Suspected LAD infarct based on 1 wall motion abnormalities.  Conservative CAD management given his age.  Currently, we will plan volume optimization.  Decrease IV Bumex dosing.  Will optimize medical therapy thereafter.  Will follow.        Puma Peterson DO  Cardiologist  [facility]  6/4/2025 11:50 AM      Note to the patient: The 21st Century Cures Act makes medical notes like these available to patients in the interest of transparency. However, be advised that this is a medical document. It is intended as peer to peer communication. It is written in medical  language and may contain abbreviations or verbiage that are unfamiliar. It may appear blunt or direct. Medical documents are intended to carry relevant information, facts as evident, and clinical opinion of the practitioner.     Disclaimer: Components of this note were documented using voice recognition system and are subject to errors not corrected at proofreading. Contact the author of this note for any clarifications.          [1]    polyethylene glycol (PEG 3350)  17 g Oral Daily    docusate sodium  100 mg Oral BID    bumetanide  2 mg Intravenous TID    ampicillin-sulbactam  3 g Intravenous q6h    amiodarone  200 mg Oral QPM    aspirin  81 mg Oral Daily    pantoprazole  40 mg Oral BID AC    rosuvastatin  10 mg Oral Daily    tamsulosin  0.4 mg Oral QPM    enoxaparin  40 mg Subcutaneous Daily   [2]

## 2025-06-05 ENCOUNTER — APPOINTMENT (OUTPATIENT)
Dept: GENERAL RADIOLOGY | Facility: HOSPITAL | Age: OVER 89
End: 2025-06-05
Attending: STUDENT IN AN ORGANIZED HEALTH CARE EDUCATION/TRAINING PROGRAM
Payer: MEDICARE

## 2025-06-05 LAB
ANION GAP SERPL CALC-SCNC: 11 MMOL/L (ref 0–18)
BUN BLD-MCNC: 24 MG/DL (ref 9–23)
CALCIUM BLD-MCNC: 8.4 MG/DL (ref 8.7–10.6)
CHLORIDE SERPL-SCNC: 101 MMOL/L (ref 98–112)
CO2 SERPL-SCNC: 30 MMOL/L (ref 21–32)
CREAT BLD-MCNC: 1.07 MG/DL (ref 0.7–1.3)
EGFRCR SERPLBLD CKD-EPI 2021: 63 ML/MIN/1.73M2 (ref 60–?)
GLUCOSE BLD-MCNC: 110 MG/DL (ref 70–99)
MAGNESIUM SERPL-MCNC: 2.2 MG/DL (ref 1.6–2.6)
NT-PROBNP SERPL-MCNC: 5266 PG/ML (ref ?–450)
OSMOLALITY SERPL CALC.SUM OF ELEC: 299 MOSM/KG (ref 275–295)
POTASSIUM SERPL-SCNC: 3.4 MMOL/L (ref 3.5–5.1)
POTASSIUM SERPL-SCNC: 3.4 MMOL/L (ref 3.5–5.1)
SODIUM SERPL-SCNC: 142 MMOL/L (ref 136–145)

## 2025-06-05 PROCEDURE — 99233 SBSQ HOSP IP/OBS HIGH 50: CPT | Performed by: HOSPITALIST

## 2025-06-05 PROCEDURE — 71046 X-RAY EXAM CHEST 2 VIEWS: CPT | Performed by: STUDENT IN AN ORGANIZED HEALTH CARE EDUCATION/TRAINING PROGRAM

## 2025-06-05 RX ORDER — POTASSIUM CHLORIDE 1500 MG/1
40 TABLET, EXTENDED RELEASE ORAL ONCE
Status: COMPLETED | OUTPATIENT
Start: 2025-06-05 | End: 2025-06-05

## 2025-06-05 RX ORDER — BISACODYL 10 MG
10 SUPPOSITORY, RECTAL RECTAL ONCE
Status: DISCONTINUED | OUTPATIENT
Start: 2025-06-05 | End: 2025-06-06

## 2025-06-05 NOTE — PROGRESS NOTES
Progress Note  Beba Mijares Patient Status:  Inpatient    1926 MRN [id number]   Location [facility] 5NW-A Attending Trevor Oden MD   Hosp Day # 4 PCP CLARK HIGGINS     Subjective:  Resting comfortably.  States his breathing is improving.  No acute overnight events.    Objective:  /63 (BP Location: Right arm)   Pulse 62   Temp 97.8 °F (36.6 °C) (Oral)   Resp 18   Ht 5' 6\" (1.676 m)   Wt 138 lb 10.7 oz (62.9 kg)   SpO2 (!) 86%   BMI 22.38 kg/m²       Intake/Output:    Intake/Output Summary (Last 24 hours) at 2025 1238  Last data filed at 2025 1200  Gross per 24 hour   Intake 373 ml   Output 2375 ml   Net -2002 ml       Last 3 Weights   25 0813 138 lb 10.7 oz (62.9 kg)   25 0640 145 lb 4.5 oz (65.9 kg)   25 2141 150 lb (68 kg)   25 2133 150 lb 8 oz (68.3 kg)   25 1733 140 lb (63.5 kg)   25 0644 140 lb (63.5 kg)   25 0442 140 lb 3.2 oz (63.6 kg)   02/15/25 1850 100 lb (45.4 kg)   24 0221 142 lb 3.2 oz (64.5 kg)   24 2218 140 lb (63.5 kg)       Labs:  Recent Labs   Lab 25  0614 25  0708 25  0620   GLU 95 106* 110*   BUN 15 22 24*   CREATSERUM 0.79 0.93 1.07   EGFRCR 80 74 63   CA 8.4* 8.2* 8.4*    141 142   K 4.0 3.1* 3.4*  3.4*    98 101   CO2 24.0 31.0 30.0     Recent Labs   Lab 25  1740 25  0750 25  0614   RBC 4.61 4.33 4.18   HGB 13.1 12.1* 11.8*   HCT 38.5* 35.9* 35.0*   MCV 83.5 82.9 83.7   MCH 28.4 27.9 28.2   MCHC 34.0 33.7 33.7   RDW 16.0 16.1 16.3   NEPRELIM 9.48*  --  7.68   WBC 12.4* 11.6* 10.2   .0 371.0 355.0         Recent Labs   Lab 25  1740   TROPHS 23       ROS    Physical Exam:    Gen: alert, NAD  Heent: pupils equal, reactive. Mucous membranes moist.   Neck: no jvd  Cardiac: regular rate and rhythm, normal S1,S2  Lungs: CTA, dim bases  Abd: soft, NT/ND +bs  Ext: no edema  Skin: Warm, dry  Neuro: No focal deficits        Medications:    Scheduled  Medications[1]  Medication Infusions[2]        Assessment:  Fall, possible syncope.   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 As outlined no plans for aggressive approach to CAD  Conservative GDMT given advanced age, frailty, orthostasis  Probnp 5266>5393  CAD with prior PCI  Hx CVA  PAF on amio, maintaining NSR  Historically not on AC given elevated risk.   MCT 3/2025 with 0% afib burden  HL-statin    Plan:  Continues to have great UO. Overall negative 6.7L since admit. Remains on o2. Will continue with current dosing.   May consider addition of acei/arb pending bp after diuresis. No bb with bradycardia.   Abx per primary    Plan of care discussed with patient, RN, Dr. Oden, Dr. Peterson.     Miriam Kramer, APRN  6/5/2025  12:38 PM             [1]    bisacodyl  10 mg Rectal Once    polyethylene glycol (PEG 3350)  17 g Oral Daily    docusate sodium  100 mg Oral BID    bumetanide  2 mg Intravenous BID (Diuretic)    ampicillin-sulbactam  3 g Intravenous q6h    amiodarone  200 mg Oral QPM    aspirin  81 mg Oral Daily    pantoprazole  40 mg Oral BID AC    rosuvastatin  10 mg Oral Daily    tamsulosin  0.4 mg Oral QPM    enoxaparin  40 mg Subcutaneous Daily   [2]

## 2025-06-05 NOTE — PROGRESS NOTES
[facility]   part of [facility]     Hospitalist Progress Note     Beba Mijares Patient Status:  Inpatient    1926 MRN [id number]   Location [facility] 5NW-A Attending Trevor Oden MD   Hosp Day # 4 PCP CLARK HIGGINS     Chief Complaint: Syncope    Subjective:   Patient complains of constipation. No respiratory issues. Weaned to room air at time of visit.     Current medications:   bisacodyl  10 mg Rectal Once    polyethylene glycol (PEG 3350)  17 g Oral Daily    docusate sodium  100 mg Oral BID    bumetanide  2 mg Intravenous BID (Diuretic)    ampicillin-sulbactam  3 g Intravenous q6h    amiodarone  200 mg Oral QPM    aspirin  81 mg Oral Daily    pantoprazole  40 mg Oral BID AC    rosuvastatin  10 mg Oral Daily    tamsulosin  0.4 mg Oral QPM    enoxaparin  40 mg Subcutaneous Daily       Objective:    Review of Systems:   10 point ROS completed and was negative, except for pertinent positive and negatives stated in subjective.    Vital signs:  Temp:  [97.8 °F (36.6 °C)-99.2 °F (37.3 °C)] 97.8 °F (36.6 °C)  Pulse:  [54-72] 66  Resp:  [16-18] 18  BP: (116-135)/(63-72) 116/63  SpO2:  [86 %-98 %] 95 %  Patient Weight for the past 72 hrs:   Weight   25 1733 140 lb (63.5 kg)   25 2133 150 lb 8 oz (68.3 kg)   25 2141 150 lb (68 kg)     Physical Exam:    General: No acute distress.   Respiratory: Diminished   Cardiovascular: S1, S2.   Abdomen: Soft, nontender, nondistended.  Positive bowel sounds.  Extremities: Trace edema   Neuro: Alert and awake    Diagnostic Data:    Labs:  Recent Labs   Lab 25  1740 25  0750 25  0614   WBC 12.4* 11.6* 10.2   HGB 13.1 12.1* 11.8*   MCV 83.5 82.9 83.7   .0 371.0 355.0   INR 1.25*  --   --        Recent Labs   Lab 25  1740 25  0750 25  0614 25  0708 25  0620   * 121* 95 106* 110*   BUN 16 12 15 22 24*   CREATSERUM 0.72 0.70 0.79 0.93 1.07   CA 8.7 8.5* 8.4* 8.2* 8.4*   ALB 3.6 3.5  --    --   --    * 134* 137 141 142   K 4.7 4.7 4.0 3.1* 3.4*  3.4*   CL 96* 100 101 98 101   CO2 28.0 23.0 24.0 31.0 30.0   ALKPHO 87 82  --   --   --    AST 30 28  --   --   --    ALT 30 29  --   --   --    BILT 0.6 0.6  --   --   --    TP 6.5 6.2  --   --   --        Estimated Creatinine Clearance: 34.3 mL/min (based on SCr of 1.07 mg/dL).    Recent Labs   Lab 06/01/25  1740   PTP 15.8*   INR 1.25*            COVID-19 Lab Results    COVID-19  Lab Results   Component Value Date    COVID19 Not Detected 06/01/2025    COVID19 Detected (A) 02/18/2025       Pro-Calcitonin  Recent Labs   Lab 06/01/25  1740   PCT 0.13*       Cardiac  Recent Labs   Lab 06/05/25  0620   PBNP 5,266*       Creatinine Kinase  No results for input(s): \"CK\" in the last 168 hours.    Inflammatory Markers  No results for input(s): \"CRP\", \"TOSHA\", \"LDH\", \"DDIMER\" in the last 168 hours.    Recent Labs   Lab 06/01/25  1740   TROPHS 23       Imaging: Imaging data reviewed in Epic.    Medications:    bisacodyl  10 mg Rectal Once    polyethylene glycol (PEG 3350)  17 g Oral Daily    docusate sodium  100 mg Oral BID    bumetanide  2 mg Intravenous BID (Diuretic)    ampicillin-sulbactam  3 g Intravenous q6h    amiodarone  200 mg Oral QPM    aspirin  81 mg Oral Daily    pantoprazole  40 mg Oral BID AC    rosuvastatin  10 mg Oral Daily    tamsulosin  0.4 mg Oral QPM    enoxaparin  40 mg Subcutaneous Daily       Assessment & Plan:    Syncope, CTb neg, concern for arrhythmia given depressed EF, EF now 25-30%  Aortic stenosis, mild-moderate   Telemetry   Acute hypoxic respiratory failure d/t possible pneumonia though suspect more acute on chronic heart failure, RVP neg, PCT  IV abx - short course > completes tomorrow   IV diuretics   Oxygen, wean as able - currently room air   Incentive spirometry  Increase activity, OOB to chair  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 - only taking Bumex as needed PTA  Bumex IV -  dose increased 6/3, decreased 6/4  Daily weight   Monitor I/O   Cardiology consult   Atrial fibrillation, not on anticoagulation  Amiodarone  Telemetry   Hypotension on arrival, resolved   CAD sp PCI  Essential hypertension  Dyslipidemia  Aspirin  Crestor  PTA antihypertensives on hold  Monitor hemodynamics   BPH  Flomax  Constipation  Bowel care adjusted   Leukocytosis,improving    Hyponatremia, improving  Hypercoagulable state, history of post-op DVT/PE off Eliquis  Cerebrovascular disease   Carotid stenosis sp stent  Hearing impairment     DVT Px: Lovenox     Discharge planning. Hope for discharge tomorrow.     Plan of care discussed with patient, RN and cardiology APN.    Trevor Oden MD        Supplementary Documentation:   DVT Mechanical Prophylaxis:   SCDs, Early ambuation  DVT Pharmacologic Prophylaxis   Medication    enoxaparin (Lovenox) 40 MG/0.4ML SUBQ injection 40 mg      DVT Pharmacologic prophylaxis: Aspirin 162 mg         Code Status: Prior  Jennings: No urinary catheter in place  Jennings Duration (in days):   Central line:    ELIN:

## 2025-06-05 NOTE — PLAN OF CARE
Pt is A&Ox3-4, forgetful. Wears glasses, [place]- b/l HA. VSS, afebrile. SPO2 maintained on 2L, RA-BL. 2L at NOC. Tele, NSR/SB. EP. DW. Strict I&Os. Lovenox. IV bumex. Last BM 6/3, c/o constipation. Cardiac diet, thins okay/small bites. Voids/urinal. Up SB walker. Denies pain. PIV, IV ABX. No further needs at this time, continue POC. Safety precautions in place     Problem: Patient/Family Goals  Goal: Patient/Family Long Term Goal  Description: Patient's Long Term Goal: discharge to home    Interventions:  - Follow POC  - See additional Care Plan goals for specific interventions  Outcome: Progressing  Goal: Patient/Family Short Term Goal  Description: Patient's Short Term Goal: 6/1NOC:rest,safety  6/3 NOC: sleep  6/4 NOC: sleep    Interventions:   - cluster care  - See additional Care Plan goals for specific interventions  Outcome: Progressing

## 2025-06-05 NOTE — PROGRESS NOTES
Progress Note  Beba Mijares Patient Status:  Inpatient    1926 MRN [id number]   Location [facility] 5NW-A Attending Trevor Oden MD   Hosp Day # 4 PCP CLARK HIGGINS     Subjective:  Resting in bed.  Complains of cough.  No other complaints at this time.  No acute overnight events.    Objective:  /63 (BP Location: Right arm)   Pulse 72   Temp 97.8 °F (36.6 °C) (Oral)   Resp 18   Ht 5' 6\" (1.676 m)   Wt 138 lb 10.7 oz (62.9 kg)   SpO2 93%   BMI 22.38 kg/m²       Intake/Output:    Intake/Output Summary (Last 24 hours) at 2025 1317  Last data filed at 2025 1230  Gross per 24 hour   Intake 373 ml   Output 2375 ml   Net -2002 ml       Last 3 Weights   25 0813 138 lb 10.7 oz (62.9 kg)   25 0640 145 lb 4.5 oz (65.9 kg)   25 2141 150 lb (68 kg)   25 2133 150 lb 8 oz (68.3 kg)   25 1733 140 lb (63.5 kg)   25 0644 140 lb (63.5 kg)   25 0442 140 lb 3.2 oz (63.6 kg)   02/15/25 1850 100 lb (45.4 kg)   24 0221 142 lb 3.2 oz (64.5 kg)   24 2218 140 lb (63.5 kg)       Labs:  Recent Labs   Lab 25  0614 25  0708 25  0620   GLU 95 106* 110*   BUN 15 22 24*   CREATSERUM 0.79 0.93 1.07   EGFRCR 80 74 63   CA 8.4* 8.2* 8.4*    141 142   K 4.0 3.1* 3.4*  3.4*    98 101   CO2 24.0 31.0 30.0     Recent Labs   Lab 25  1740 25  0750 25  0614   RBC 4.61 4.33 4.18   HGB 13.1 12.1* 11.8*   HCT 38.5* 35.9* 35.0*   MCV 83.5 82.9 83.7   MCH 28.4 27.9 28.2   MCHC 34.0 33.7 33.7   RDW 16.0 16.1 16.3   NEPRELIM 9.48*  --  7.68   WBC 12.4* 11.6* 10.2   .0 371.0 355.0         Recent Labs   Lab 25  1740   TROPHS 23       ROS    Physical Exam:    Gen: alert, NAD  Heent: pupils equal, reactive. Mucous membranes moist.   Neck: no jvd  Cardiac: regular rate and rhythm, normal S1,S2  Lungs: CTA, dim bases  Abd: soft, NT/ND +bs  Ext: no edema  Skin: Warm, dry  Neuro: No focal  deficits        Medications:    Scheduled Medications[1]  Medication Infusions[2]        Assessment:  Fall, possible syncope.   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 As outlined no plans for aggressive approach to CAD  Conservative GDMT given advanced age, frailty, orthostasis  CAD with prior PCI  Hx CVA  PAF on amio, maintaining NSR  Historically not on AC given elevated risk.   MCT 3/2025 with 0% afib burden  HL-statin    Plan:  -2 L urine output in the last 24 hours.  Renal function stable.  Continue IV Bumex reduced dosing at 2 mg IV twice daily.  Hypoxia improving.  Check 2 view CXR.  May consider addition of acei/arb pending bp after diuresis. No bb with bradycardia.   Abx per primary      Puma Peterson DO  Cardiologist  [facility]  6/5/2025 1:18 PM      Note to the patient: The 21st Century Cures Act makes medical notes like these available to patients in the interest of transparency. However, be advised that this is a medical document. It is intended as peer to peer communication. It is written in medical language and may contain abbreviations or verbiage that are unfamiliar. It may appear blunt or direct. Medical documents are intended to carry relevant information, facts as evident, and clinical opinion of the practitioner.     Disclaimer: Components of this note were documented using voice recognition system and are subject to errors not corrected at proofreading. Contact the author of this note for any clarifications.            [1]    bisacodyl  10 mg Rectal Once    polyethylene glycol (PEG 3350)  17 g Oral Daily    docusate sodium  100 mg Oral BID    bumetanide  2 mg Intravenous BID (Diuretic)    ampicillin-sulbactam  3 g Intravenous q6h    amiodarone  200 mg  Oral QPM    aspirin  81 mg Oral Daily    pantoprazole  40 mg Oral BID AC    rosuvastatin  10 mg Oral Daily    tamsulosin  0.4 mg Oral QPM    enoxaparin  40 mg Subcutaneous Daily   [2]

## 2025-06-06 LAB
ANION GAP SERPL CALC-SCNC: 10 MMOL/L (ref 0–18)
BUN BLD-MCNC: 28 MG/DL (ref 9–23)
CALCIUM BLD-MCNC: 8.8 MG/DL (ref 8.7–10.6)
CHLORIDE SERPL-SCNC: 103 MMOL/L (ref 98–112)
CO2 SERPL-SCNC: 31 MMOL/L (ref 21–32)
CREAT BLD-MCNC: 1.28 MG/DL (ref 0.7–1.3)
EGFRCR SERPLBLD CKD-EPI 2021: 51 ML/MIN/1.73M2 (ref 60–?)
GLUCOSE BLD-MCNC: 108 MG/DL (ref 70–99)
OSMOLALITY SERPL CALC.SUM OF ELEC: 304 MOSM/KG (ref 275–295)
POTASSIUM SERPL-SCNC: 3.6 MMOL/L (ref 3.5–5.1)
POTASSIUM SERPL-SCNC: 3.6 MMOL/L (ref 3.5–5.1)
SODIUM SERPL-SCNC: 144 MMOL/L (ref 136–145)

## 2025-06-06 PROCEDURE — 99232 SBSQ HOSP IP/OBS MODERATE 35: CPT | Performed by: HOSPITALIST

## 2025-06-06 RX ORDER — BUMETANIDE 0.25 MG/ML
2 INJECTION, SOLUTION INTRAMUSCULAR; INTRAVENOUS DAILY
Status: DISCONTINUED | OUTPATIENT
Start: 2025-06-06 | End: 2025-06-06

## 2025-06-06 RX ORDER — BUMETANIDE 1 MG/1
1 TABLET ORAL DAILY
Status: DISCONTINUED | OUTPATIENT
Start: 2025-06-07 | End: 2025-06-08

## 2025-06-06 NOTE — PHYSICAL THERAPY NOTE
PHYSICAL THERAPY TREATMENT NOTE - INPATIENT    Room Number: 525/525-A     Session: 2    Number of Visits to Meet Established Goals: 4    Presenting Problem: acute hypoxemic respiratory failure, hypotension, PNA  Co-Morbidities : PE, MI, HTN, stroke, deaf, cardiac stents    PHYSICAL THERAPY ASSESSMENT   Patient demonstrates good  progress this session, goals  progressing with 3/3 met this session.  One goal updated.     Patient is requiring supervision as a result of the following impairments: decreased functional strength, decreased endurance/aerobic capacity, impaired dynamic balance, decreased muscular endurance, and increased O2 needs from baseline.     Patient continues to function below baseline with transfers and gait.  Next session anticipate patient to progress transfers and gait.  Physical Therapy will continue to follow patient for duration of hospitalization.    Patient continues to benefit from continued skilled PT services: at discharge to promote prior level of function.  Anticipate patient will return home with home health PT.    PLAN DURING HOSPITALIZATION  Nursing Mobility Recommendation : 1 Assist  PT Device Recommendation: Rolling walker  PT Treatment Plan: Bed mobility, Endurance, Energy conservation, Patient education, Gait training, Family education, Strengthening, Transfer training, Balance training  Frequency (Obs): 3-5x/week     CURRENT GOALS     Goal #1 Patient is able to demonstrate supine - sit EOB @ level: supervision   MET   Goal #2 Patient is able to demonstrate transfers Sit to/from Stand at assistance level: supervision   Met   Goal #3 Patient is able to ambulate 150 feet with assist device: walker - rolling at assistance level: supervision   Met  Updated to amb 50' without a device with supervision.    Goal #4     Goal #5     Goal #6     Goal Comments: Goals established on 6/2/2025    SUBJECTIVE  \" My wife has a caregiver right now.\"    OBJECTIVE  Precautions: Bed/chair alarm, Hard  of hearing    WEIGHT BEARING RESTRICTION     PAIN ASSESSMENT   Ratin          BALANCE                                                                                                                       Static Sitting: Fair  Dynamic Sitting: Fair -           Static Standing: Fair -  Dynamic Standing: Fair -    ACTIVITY TOLERANCE                         O2 WALK       AM-PAC '6-Clicks' INPATIENT SHORT FORM - BASIC MOBILITY  How much difficulty does the patient currently have...  Patient Difficulty: Turning over in bed (including adjusting bedclothes, sheets and blankets)?: None   Patient Difficulty: Sitting down on and standing up from a chair with arms (e.g., wheelchair, bedside commode, etc.): A Little   Patient Difficulty: Moving from lying on back to sitting on the side of the bed?: None   How much help from another person does the patient currently need...   Help from Another: Moving to and from a bed to a chair (including a wheelchair)?: A Little   Help from Another: Need to walk in hospital room?: A Little   Help from Another: Climbing 3-5 steps with a railing?: A Little     AM-PAC Score:  Raw Score: 20   Approx Degree of Impairment: 35.83%   Standardized Score (AM-PAC Scale): 47.67   CMS Modifier (G-Code): CJ    FUNCTIONAL ABILITY STATUS  Gait Assessment   Functional Mobility/Gait Assessment  Gait Assistance: Supervision  Distance (ft): 200  Assistive Device: Rolling walker  Pattern:  (slow roberto, dec step length)    Skilled Therapy Provided    Bed Mobility:  Rolling: Sup   Supine<>Sit: Sup   Sit<>Supine: sup     Transfer Mobility:  Sit<>Stand:SBA to RW. Patient shows wide ROBERT and stabilizing le's against the bed for safety and balance.    Stand<>Sit: SBA  Gait: SBA with RW. Patient reported of fatigue at the end of gait distance. At this time, patient unsteady without a device.     Therapist's Comments: RN approved session. Pt in agreement.   Education provided on  Benefits of upright  position  Promotion of walking with nursing staff  Exercises   Body mechanics       THERAPEUTIC EXERCISES  Lower Extremity AM  LAQ  Standing marching  Standing SLR.      Upper Extremity Elbow flex/ext, OH Reaching, and Scapular Retraction       Position Sitting     Repetitions   10   Sets   1     Patient End of Session: Up in chair, Needs met, Call light within reach, RN aware of session/findings, All patient questions and concerns addressed, Hospital anti-slip socks, Alarm set    PT Session Time: 25 minutes  Gait Training: 15 minutes  Therapeutic Exercise: 10 minutes

## 2025-06-06 NOTE — PLAN OF CARE
Problem: Patient/Family Goals  Goal: Patient/Family Long Term Goal  Description: Patient's Long Term Goal: discharge to home    Interventions:  - Follow POC  - See additional Care Plan goals for specific interventions  Outcome: Progressing  Goal: Patient/Family Short Term Goal  Description: Patient's Short Term Goal: 6/1NOC:rest,safety  6/3 NOC: sleep  6/4 NOC: sleep  6/5 NOC: sleep    Interventions:   - cluster care  - See additional Care Plan goals for specific interventions  Outcome: Progressing     Problem: RESPIRATORY - ADULT  Goal: Achieves optimal ventilation and oxygenation  Description: INTERVENTIONS:  - Assess for changes in respiratory status  - Assess for changes in mentation and behavior  - Position to facilitate oxygenation and minimize respiratory effort  - Oxygen supplementation based on oxygen saturation or ABGs  - Provide Smoking Cessation handout, if applicable  - Encourage broncho-pulmonary hygiene including cough, deep breathe, Incentive Spirometry  - Assess the need for suctioning and perform as needed  - Assess and instruct to report SOB or any respiratory difficulty  - Respiratory Therapy support as indicated  - Manage/alleviate anxiety  - Monitor for signs/symptoms of CO2 retention  Outcome: Progressing

## 2025-06-06 NOTE — PLAN OF CARE
Patient AAOx3. Tele-SB 50s/NSR. Weaned from 2L O2 to room air, saturating WNL. Reports generalized mild discomfort, declined prn pain medication. IV antibiotics and IV bumex given per MAR. CXR ordered-pending completion. No BM several days per patient, Scheduled bowel regimen and MOM given per MAR with BM today. Continent. Standby with walker. Patient and POA Mima updated/educated on POC, questions answered as able, understanding verbalized.     Problem: Patient/Family Goals  Goal: Patient/Family Long Term Goal  Description: Patient's Long Term Goal: discharge to home    Interventions:  - Follow POC  -wean O2  -diurese as ordered  -IV abx as ordered  - See additional Care Plan goals for specific interventions  Outcome: Progressing  Goal: Patient/Family Short Term Goal  Description: Patient's Short Term Goal: 6/1NOC:rest,safety  6/5: wean O2 as tolerated     Interventions:   -   -perform IS  -up to chair for meals  -IV abx  -wean O2 as able  - See additional Care Plan goals for specific interventions  Outcome: Progressing     Problem: RESPIRATORY - ADULT  Goal: Achieves optimal ventilation and oxygenation  Description: INTERVENTIONS:  - Assess for changes in respiratory status  - Assess for changes in mentation and behavior  - Position to facilitate oxygenation and minimize respiratory effort  - Oxygen supplementation based on oxygen saturation or ABGs  - Provide Smoking Cessation handout, if applicable  - Encourage broncho-pulmonary hygiene including cough, deep breathe, Incentive Spirometry  - Assess the need for suctioning and perform as needed  - Assess and instruct to report SOB or any respiratory difficulty  - Respiratory Therapy support as indicated  - Manage/alleviate anxiety  - Monitor for signs/symptoms of CO2 retention  Outcome: Progressing

## 2025-06-06 NOTE — PROGRESS NOTES
Progress Note  Beba Mijares Patient Status:  Inpatient    1926 MRN [id number]   Location [facility] 5NW-A Attending Trevor Oden MD   Hosp Day # 5 PCP CL[facility] HIGGINS     Subjective:  He is resting comfortably in bed. Feels breathing is better and denies SOB. Currently on 2 L NC.     Objective:  /59 (BP Location: Right arm)   Pulse 57   Temp 97.8 °F (36.6 °C) (Oral)   Resp 18   Ht 5' 6\" (1.676 m)   Wt 129 lb 6.4 oz (58.7 kg)   SpO2 97%   BMI 20.89 kg/m²     Intake/Output:    Intake/Output Summary (Last 24 hours) at 2025 1041  Last data filed at 2025 1027  Gross per 24 hour   Intake 1530 ml   Output 2150 ml   Net -620 ml       Last 3 Weights   25 0500 129 lb 6.4 oz (58.7 kg)   25 0813 138 lb 10.7 oz (62.9 kg)   25 0640 145 lb 4.5 oz (65.9 kg)   25 2141 150 lb (68 kg)   25 2133 150 lb 8 oz (68.3 kg)   25 1733 140 lb (63.5 kg)   25 0644 140 lb (63.5 kg)   25 0442 140 lb 3.2 oz (63.6 kg)   02/15/25 1850 100 lb (45.4 kg)   24 0221 142 lb 3.2 oz (64.5 kg)   24 2218 140 lb (63.5 kg)       Labs:  Recent Labs   Lab 25  0708 25  0620 25  0621   * 110* 108*   BUN 22 24* 28*   CREATSERUM 0.93 1.07 1.28   EGFRCR 74 63 51*   CA 8.2* 8.4* 8.8    142 144   K 3.1* 3.4*  3.4* 3.6  3.6   CL 98 101 103   CO2 31.0 30.0 31.0     Recent Labs   Lab 25  1740 25  0750 25  0614   RBC 4.61 4.33 4.18   HGB 13.1 12.1* 11.8*   HCT 38.5* 35.9* 35.0*   MCV 83.5 82.9 83.7   MCH 28.4 27.9 28.2   MCHC 34.0 33.7 33.7   RDW 16.0 16.1 16.3   NEPRELIM 9.48*  --  7.68   WBC 12.4* 11.6* 10.2   .0 371.0 355.0         Recent Labs   Lab 25  1740   TROPHS 23       Diagnostics:   Telemetry: sinus bradycardia/NSR    Review of Systems   Cardiovascular:  Negative for chest pain.   Respiratory:  Negative for shortness of breath.        Physical Exam:  General: Alert and oriented in no apparent distress.  elderly  HEENT: Pupils equal. Mucous membranes moist.   Neck: No JVD, flat  Cardiac:  Normal S1 S2, Regular. 1/6 systolic murmur  Lungs: Clear without wheezes or crackles    Abdomen: Soft, non-tender, ND  Extremities: Without clubbing, cyanosis or edema.    Neurologic: No focal deficits. Normal affect.  Skin: Warm and dry,    Medications:  Scheduled Medications[1]  Medication Infusions[2]    Assessment:    Fall, possible syncope - hypotensive on admission, possibly orthostasis vs mechanical fall   Acute hypoxic respiratory failure  2 L NC without baseline oxygen requirements. Suspect due to CHF and pneumonia. Completing IV antibiotics.  Acute on chronic biventricular HFrEF, mild-mod aortic stenosis  Decompensated on presentation. 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 No plans for aggressive approach to CAD given goals of care/advanced age.  GDMT: Could consider low dose ARB in outpatient setting, not starting at this time due to concerns of hypotension on presentation. No BB sinus bradycardia.  Appears euvolemic today on clinical exam. Down -6.8 L this admission. IV bumex 2 mg this morning. Transition to PO diuretics.  CAD prior PCI - ASA, statin  History of CVA  Paroxysmal atrial fibrillation - Maintaining NSR on amiodarone. MCT 3/2025 with 0% AF burden. Not on AC due to elevated bleed risk with falls.   HLD- statin    Plan:  Appears clinically euvolemic. Discussed with RN who will attempt to wean oxygen back to room air. He received IV bumex this morning. Will transition to PO bumex tomorrow. He was previously on PO bumex PRN, will schedule PO bumex 1 mg daily.     Plan of care discussed with patient, RN.    ALICE Ewing  6/6/2025  10:41 AM    Note reviewed and labs reviewed. Agree with above assessment and plan.  MDM per me.  89-year-old male who presented with possible  syncope and was noted to have acute on chronic biventricular HFrEF.  Diuresing well with IV Bumex.  Renal function is stable.  Wean oxygen.  2 view CXR from yesterday notable for bilateral mixed interstitial opacities with mild improvement.  Some underlying scarring also possible.  Small left pleural effusion.  If unable to wean oxygen, may need further diuresis. Will follow.      Puma Peterson DO  Cardiologist  [facility]  6/6/2025 12:52 PM      Note to the patient: The 21st Century Cures Act makes medical notes like these available to patients in the interest of transparency. However, be advised that this is a medical document. It is intended as peer to peer communication. It is written in medical language and may contain abbreviations or verbiage that are unfamiliar. It may appear blunt or direct. Medical documents are intended to carry relevant information, facts as evident, and clinical opinion of the practitioner.     Disclaimer: Components of this note were documented using voice recognition system and are subject to errors not corrected at proofreading. Contact the author of this note for any clarifications.            [1]    bumetanide  2 mg Intravenous Daily    polyethylene glycol (PEG 3350)  17 g Oral Daily    docusate sodium  100 mg Oral BID    ampicillin-sulbactam  3 g Intravenous q6h    amiodarone  200 mg Oral QPM    aspirin  81 mg Oral Daily    pantoprazole  40 mg Oral BID AC    rosuvastatin  10 mg Oral Daily    tamsulosin  0.4 mg Oral QPM    enoxaparin  40 mg Subcutaneous Daily   [2]

## 2025-06-06 NOTE — DIETARY NOTE
[facility]   part of [facility]   CLINICAL NUTRITION    Beba Mijares admitted on 6/1 presents with SOB and syncope.    PMH: Atherosclerosis of coronary artery, Colitis, Deaf, Heart attack, High blood pressure, High cholesterol, Pulmonary embolism, Stroke     Admitting diagnosis:  Acute bronchospasm [J98.01]  Acute hypoxemic respiratory failure (HCC) [J96.01]  Community acquired pneumonia, unspecified laterality [J18.9]  Acute on chronic congestive heart failure, unspecified heart failure type (HCC) [I50.9]    Ht: 167.6 cm (5' 6\")  Wt: 58.7 kg (129 lb 6.4 oz).   Body mass index is 20.89 kg/m².    Wt Readings from Last 6 Encounters:   06/06/25 58.7 kg (129 lb 6.4 oz)   02/16/25 63.5 kg (140 lb)   06/04/24 64.5 kg (142 lb 3.2 oz)   03/29/24 63.5 kg (140 lb)   09/22/16 69.5 kg (153 lb 3.5 oz)   07/06/16 68 kg (150 lb)        Labs/Meds reviewed    Diet:       Procedures    Cardiac diet Cardiac; Is Patient on Accuchecks? No       Percent Meals Eaten (last 3 days)       Date/Time Percent Meals Eaten (%)    06/04/25 1000 75 %    06/05/25 1040 75 %    06/05/25 1900 80 %    06/06/25 0905 100 %            Pt chart reviewed d/t LOS. Per chart review, pt with good appetite/PO intake; Nursing notes reports Percent Meals Eaten (%): 100 % intake for last meal. Tolerating po diet without diarrhea, emesis, or nausea but having some constipation with last BM 6/5. No chewing or swallowing difficulties (SLP recommends regular solids and thin liquids) and NKFA. No significant weight changes noted (-11 lb x 4 months, 8%). No nutrition interventions warranted at this time. Noted plan for possible discharge today vs tmrw. Will continue to monitor and follow up as appropriate.    Patient is at low nutrition risk at this time.    Please consult if patient status changes or nutrition issues arise.    Carol Johnson, RD, LDN, CNSC  Clinical Dietitian  Spectra: [phone]

## 2025-06-06 NOTE — PLAN OF CARE
Pt is A&Ox3-4, forgetful. Wears glasses, [place]- b/l HA. VSS, afebrile. SPO2 maintained on RA. 2L at NOC. Tele, NSR/SB. EP. DW. Strict I&Os. Lovenox. IV bumex. Last BM 6/5. Cardiac diet, thins okay/small bites. Voids/urinal. Up SB walker. Denies pain. PIV, IV ABX. No further needs at this time, continue POC. Safety precautions in place     Problem: Patient/Family Goals  Goal: Patient/Family Long Term Goal  Description: Patient's Long Term Goal: discharge to home    Interventions:  - Follow POC  - See additional Care Plan goals for specific interventions  Outcome: Progressing  Goal: Patient/Family Short Term Goal  Description: Patient's Short Term Goal: 6/1NOC:rest,safety  6/3 NOC: sleep  6/4 NOC: sleep  6/5 NOC: sleep    Interventions:   - cluster care  - See additional Care Plan goals for specific interventions  Outcome: Progressing     Problem: RESPIRATORY - ADULT  Goal: Achieves optimal ventilation and oxygenation  Description: INTERVENTIONS:  - Assess for changes in respiratory status  - Assess for changes in mentation and behavior  - Position to facilitate oxygenation and minimize respiratory effort  - Oxygen supplementation based on oxygen saturation or ABGs  - Provide Smoking Cessation handout, if applicable  - Encourage broncho-pulmonary hygiene including cough, deep breathe, Incentive Spirometry  - Assess the need for suctioning and perform as needed  - Assess and instruct to report SOB or any respiratory difficulty  - Respiratory Therapy support as indicated  - Manage/alleviate anxiety  - Monitor for signs/symptoms of CO2 retention  Outcome: Progressing

## 2025-06-06 NOTE — PROGRESS NOTES
[facility]   part of [facility]     Hospitalist Progress Note     Beba Mijares Patient Status:  Inpatient    1926 MRN [id number]   Location [facility] 5NW-A Attending Trevor Oden MD   Hosp Day # 5 PCP CLARK HIGGINS     Chief Complaint: Syncope    Subjective:   Patient denies new complaints. Oxygen 4L, but cannula isnt in nares    Current medications:   bumetanide  2 mg Intravenous Daily    polyethylene glycol (PEG 3350)  17 g Oral Daily    docusate sodium  100 mg Oral BID    ampicillin-sulbactam  3 g Intravenous q6h    amiodarone  200 mg Oral QPM    aspirin  81 mg Oral Daily    pantoprazole  40 mg Oral BID AC    rosuvastatin  10 mg Oral Daily    tamsulosin  0.4 mg Oral QPM    enoxaparin  40 mg Subcutaneous Daily       Objective:    Review of Systems:   10 point ROS completed and was negative, except for pertinent positive and negatives stated in subjective.    Vital signs:  Temp:  [97.7 °F (36.5 °C)-98.4 °F (36.9 °C)] 97.8 °F (36.6 °C)  Pulse:  [55-72] 57  Resp:  [16-18] 18  BP: (103-134)/(52-67) 116/59  SpO2:  [86 %-99 %] 97 %  Patient Weight for the past 72 hrs:   Weight   25 1733 140 lb (63.5 kg)   25 2133 150 lb 8 oz (68.3 kg)   25 2141 150 lb (68 kg)     Physical Exam:    General: No acute distress.   Respiratory: Clear to auscultation   Cardiovascular: S1, S2.   Abdomen: Soft, nontender, nondistended.  Positive bowel sounds.  Extremities: no edema   Neuro: Alert and awake    Diagnostic Data:    Labs:  Recent Labs   Lab 25  0750 25  0614   WBC 12.4* 11.6* 10.2   HGB 13.1 12.1* 11.8*   MCV 83.5 82.9 83.7   .0 371.0 355.0   INR 1.25*  --   --        Recent Labs   Lab 25  1740 25  0750 25  0614 25  0708 25  0625   * 121*   < > 106* 110* 108*   BUN 16 12   < > 22 24* 28*   CREATSERUM 0.72 0.70   < > 0.93 1.07 1.28   CA 8.7 8.5*   < > 8.2* 8.4* 8.8   ALB 3.6 3.5  --   --   --   --    NA  131* 134*   < > 141 142 144   K 4.7 4.7   < > 3.1* 3.4*  3.4* 3.6  3.6   CL 96* 100   < > 98 101 103   CO2 28.0 23.0   < > 31.0 30.0 31.0   ALKPHO 87 82  --   --   --   --    AST 30 28  --   --   --   --    ALT 30 29  --   --   --   --    BILT 0.6 0.6  --   --   --   --    TP 6.5 6.2  --   --   --   --     < > = values in this interval not displayed.       Estimated Creatinine Clearance: 26.8 mL/min (based on SCr of 1.28 mg/dL).    Recent Labs   Lab 06/01/25  1740   PTP 15.8*   INR 1.25*            COVID-19 Lab Results    COVID-19  Lab Results   Component Value Date    COVID19 Not Detected 06/01/2025    COVID19 Detected (A) 02/18/2025       Pro-Calcitonin  Recent Labs   Lab 06/01/25  1740   PCT 0.13*       Cardiac  Recent Labs   Lab 06/05/25  0620   PBNP 5,266*       Creatinine Kinase  No results for input(s): \"CK\" in the last 168 hours.    Inflammatory Markers  No results for input(s): \"CRP\", \"TOSHA\", \"LDH\", \"DDIMER\" in the last 168 hours.    Recent Labs   Lab 06/01/25  1740   TROPHS 23       Imaging: Imaging data reviewed in Epic.    Medications:    bumetanide  2 mg Intravenous Daily    polyethylene glycol (PEG 3350)  17 g Oral Daily    docusate sodium  100 mg Oral BID    ampicillin-sulbactam  3 g Intravenous q6h    amiodarone  200 mg Oral QPM    aspirin  81 mg Oral Daily    pantoprazole  40 mg Oral BID AC    rosuvastatin  10 mg Oral Daily    tamsulosin  0.4 mg Oral QPM    enoxaparin  40 mg Subcutaneous Daily       Assessment & Plan:    Syncope, CTb neg, concern for arrhythmia given depressed EF, EF now 25-30%  Aortic stenosis, mild-moderate   Telemetry   Acute hypoxic respiratory failure d/t possible pneumonia though suspect more acute on chronic heart failure, RVP neg, PCT  IV abx - short course > completes today    IV diuretics   Oxygen - wean off as able   O2 walk today   Incentive spirometry  Increase activity, OOB to chair  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  Daily weight   Monitor I/O   Cardiology consult   Atrial fibrillation, not on anticoagulation  Amiodarone  Telemetry   Hypotension on arrival, resolved   CAD sp PCI  Essential hypertension  Dyslipidemia  Aspirin  Crestor  PTA antihypertensives on hold  Monitor hemodynamics   BPH  Flomax  Constipation, resolved   Leukocytosis,improving    Hyponatremia, improving  Hypercoagulable state, history of post-op DVT/PE off Eliquis  Cerebrovascular disease   Carotid stenosis sp stent  Hearing impairment     DVT Px: Lovenox     Home today vs tomorrow based on respiratory status.     Plan of care discussed with patient and RN.     Trevor Oden MD        Supplementary Documentation:   DVT Mechanical Prophylaxis:   SCDs, Early ambuation  DVT Pharmacologic Prophylaxis   Medication    enoxaparin (Lovenox) 40 MG/0.4ML SUBQ injection 40 mg      DVT Pharmacologic prophylaxis: Aspirin 162 mg         Code Status: DNAR/Comfort Care  Jennings: No urinary catheter in place  Jennings Duration (in days):   Central line:    ELIN:

## 2025-06-06 NOTE — CM/SW NOTE
06/06/25 1500   CM/SW Referral Data   Referral Source Social Work (self-referral);Nurse   Reason for Referral Advance Directives/POLST   Informant EMR     Informed by RN that daughter Mima brought in copy of POLST form. Securely sent to medical records to be uploaded to EMR.     SW/CM to remain available for support and/or discharge planning.    Sugar WEAVER LCSW  Discharge Planner

## 2025-06-07 LAB
ANION GAP SERPL CALC-SCNC: 10 MMOL/L (ref 0–18)
BUN BLD-MCNC: 30 MG/DL (ref 9–23)
CALCIUM BLD-MCNC: 8.8 MG/DL (ref 8.7–10.6)
CHLORIDE SERPL-SCNC: 103 MMOL/L (ref 98–112)
CO2 SERPL-SCNC: 30 MMOL/L (ref 21–32)
CREAT BLD-MCNC: 1.22 MG/DL (ref 0.7–1.3)
EGFRCR SERPLBLD CKD-EPI 2021: 54 ML/MIN/1.73M2 (ref 60–?)
GLUCOSE BLD-MCNC: 102 MG/DL (ref 70–99)
OSMOLALITY SERPL CALC.SUM OF ELEC: 302 MOSM/KG (ref 275–295)
POTASSIUM SERPL-SCNC: 4.9 MMOL/L (ref 3.5–5.1)
SODIUM SERPL-SCNC: 143 MMOL/L (ref 136–145)

## 2025-06-07 PROCEDURE — 99233 SBSQ HOSP IP/OBS HIGH 50: CPT | Performed by: HOSPITALIST

## 2025-06-07 RX ORDER — BUMETANIDE 0.25 MG/ML
2 INJECTION, SOLUTION INTRAMUSCULAR; INTRAVENOUS ONCE
Status: COMPLETED | OUTPATIENT
Start: 2025-06-07 | End: 2025-06-07

## 2025-06-07 NOTE — PROGRESS NOTES
[facility]   part of [facility]     Hospitalist Progress Note     Beba Mijares Patient Status:  Inpatient    1926 MRN [id number]   Location [facility] 5NW-A Attending Trevor Oden MD   Hosp Day # 6 PCP CLARK HIGGINS     Chief Complaint: Syncope    Subjective:   Patient denies new complaints. On 2L via NC, weaned to 1L during visit.     Current medications:   bumetanide  2 mg Intravenous Once    bumetanide  1 mg Oral Daily    polyethylene glycol (PEG 3350)  17 g Oral Daily    docusate sodium  100 mg Oral BID    amiodarone  200 mg Oral QPM    aspirin  81 mg Oral Daily    pantoprazole  40 mg Oral BID AC    rosuvastatin  10 mg Oral Daily    tamsulosin  0.4 mg Oral QPM    enoxaparin  40 mg Subcutaneous Daily       Objective:    Review of Systems:   10 point ROS completed and was negative, except for pertinent positive and negatives stated in subjective.    Vital signs:  Temp:  [97.4 °F (36.3 °C)-98.2 °F (36.8 °C)] 97.7 °F (36.5 °C)  Pulse:  [47-59] 59  Resp:  [16-18] 17  BP: (100-128)/(52-72) 128/72  SpO2:  [95 %-99 %] 95 %  Patient Weight for the past 72 hrs:   Weight   25 1733 140 lb (63.5 kg)   25 2133 150 lb 8 oz (68.3 kg)   25 2141 150 lb (68 kg)     Physical Exam:    General: No acute distress.   Respiratory: Diminished, clear  Cardiovascular: S1, S2.   Abdomen: Soft, nontender, nondistended.  Positive bowel sounds.  Extremities: no edema   Neuro: Alert and awake    Diagnostic Data:    Labs:  Recent Labs   Lab 25  0750 25  0614   WBC 12.4* 11.6* 10.2   HGB 13.1 12.1* 11.8*   MCV 83.5 82.9 83.7   .0 371.0 355.0   INR 1.25*  --   --        Recent Labs   Lab 25  1740 25  0750 25  0614 25  0620 25  0621 25  0644   * 121*   < > 110* 108* 102*   BUN 16 12   < > 24* 28* 30*   CREATSERUM 0.72 0.70   < > 1.07 1.28 1.22   CA 8.7 8.5*   < > 8.4* 8.8 8.8   ALB 3.6 3.5  --   --   --   --    * 134*   <  > 142 144 143   K 4.7 4.7   < > 3.4*  3.4* 3.6  3.6 4.9   CL 96* 100   < > 101 103 103   CO2 28.0 23.0   < > 30.0 31.0 30.0   ALKPHO 87 82  --   --   --   --    AST 30 28  --   --   --   --    ALT 30 29  --   --   --   --    BILT 0.6 0.6  --   --   --   --    TP 6.5 6.2  --   --   --   --     < > = values in this interval not displayed.       Estimated Creatinine Clearance: 28.1 mL/min (based on SCr of 1.22 mg/dL).    Recent Labs   Lab 06/01/25  1740   PTP 15.8*   INR 1.25*            COVID-19 Lab Results    COVID-19  Lab Results   Component Value Date    COVID19 Not Detected 06/01/2025    COVID19 Detected (A) 02/18/2025       Pro-Calcitonin  Recent Labs   Lab 06/01/25  1740   PCT 0.13*       Cardiac  Recent Labs   Lab 06/05/25  0620   PBNP 5,266*       Creatinine Kinase  No results for input(s): \"CK\" in the last 168 hours.    Inflammatory Markers  No results for input(s): \"CRP\", \"TOSHA\", \"LDH\", \"DDIMER\" in the last 168 hours.    Recent Labs   Lab 06/01/25  1740   TROPHS 23       Imaging: Imaging data reviewed in Epic.    Medications:    bumetanide  2 mg Intravenous Once    bumetanide  1 mg Oral Daily    polyethylene glycol (PEG 3350)  17 g Oral Daily    docusate sodium  100 mg Oral BID    amiodarone  200 mg Oral QPM    aspirin  81 mg Oral Daily    pantoprazole  40 mg Oral BID AC    rosuvastatin  10 mg Oral Daily    tamsulosin  0.4 mg Oral QPM    enoxaparin  40 mg Subcutaneous Daily       Assessment & Plan:    Syncope, CTb neg, concern for arrhythmia given depressed EF, EF now 25-30%  Aortic stenosis, mild-moderate   Telemetry   Acute hypoxic respiratory failure d/t possible pneumonia though suspect more acute on chronic heart failure, RVP neg, PCT  IV abx - completed  IV diuretics   Oxygen - wean off as able - currently 1L  O2 walk  Incentive spirometry  Increase activity, OOB to chair  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 - only taking Bumex as needed PTA  Bumex 2mg  IV x 1 today, plan for oral tomorrow  Daily weight   Monitor I/O   Cardiology consult   Atrial fibrillation, not on anticoagulation  Amiodarone  Telemetry   Hypotension on arrival, resolved   CAD sp PCI  Essential hypertension  Dyslipidemia  Aspirin  Crestor  PTA antihypertensives on hold  Monitor hemodynamics   BPH  Flomax  Constipation, resolved   Leukocytosis,improving    Hyponatremia, improving  Hypercoagulable state, history of post-op DVT/PE off Eliquis  Cerebrovascular disease   Carotid stenosis sp stent  Hearing impairment     DVT Px: Lovenox     Plan of care discussed with patient, RN and cardiology PA.    Trevor Oden MD        Supplementary Documentation:   DVT Mechanical Prophylaxis:   SCDs, Early ambuation  DVT Pharmacologic Prophylaxis   Medication    enoxaparin (Lovenox) 40 MG/0.4ML SUBQ injection 40 mg      DVT Pharmacologic prophylaxis: Aspirin 162 mg         Code Status: DNAR/Comfort Care  Jennings: No urinary catheter in place  Jennings Duration (in days):   Central line:    ELIN:

## 2025-06-07 NOTE — PROGRESS NOTES
Received pt A&Ox3 forgetful.  on 2L, RA baseline. NSR/SB on tele. Lovenox for VTE prophylaxis. Tolerating diet. Meds per MAR. IV ABX.  Voids via urinal. Daily weight. Strict I's & o's. Up 1 walker. Denies Pain. Plan of care continues, no further needs at this time.

## 2025-06-07 NOTE — PROGRESS NOTES
Progress Note  Beba Mijares Patient Status:  Inpatient    1926 MRN [id number]   Location [facility] 5NW-A Attending Trevor Oden MD   Hosp Day # 6 PCP CLARK HIGGINS     Subjective:  He is resting comfortably eating breakfast in bed. Still on 1 L NC without baseline oxygen requirements. Ambulated with therapy yesterday. Denies feeling short of breath today.    Objective:  /56 (BP Location: Right arm)   Pulse 57   Temp 97.9 °F (36.6 °C) (Oral)   Resp 18   Ht 5' 6\" (1.676 m)   Wt 129 lb 11.2 oz (58.8 kg)   SpO2 95%   BMI 20.93 kg/m²     Intake/Output:    Intake/Output Summary (Last 24 hours) at 2025 0700  Last data filed at 2025 0454  Gross per 24 hour   Intake 500 ml   Output 1475 ml   Net -975 ml       Last 3 Weights   25 0513 129 lb 11.2 oz (58.8 kg)   25 0500 129 lb 6.4 oz (58.7 kg)   25 0813 138 lb 10.7 oz (62.9 kg)   25 0640 145 lb 4.5 oz (65.9 kg)   25 2141 150 lb (68 kg)   25 2133 150 lb 8 oz (68.3 kg)   25 1733 140 lb (63.5 kg)   25 0644 140 lb (63.5 kg)   25 0442 140 lb 3.2 oz (63.6 kg)   02/15/25 1850 100 lb (45.4 kg)   24 0221 142 lb 3.2 oz (64.5 kg)   24 2218 140 lb (63.5 kg)       Labs:  Recent Labs   Lab 25  0708 25  0620 25  0621   * 110* 108*   BUN 22 24* 28*   CREATSERUM 0.93 1.07 1.28   EGFRCR 74 63 51*   CA 8.2* 8.4* 8.8    142 144   K 3.1* 3.4*  3.4* 3.6  3.6   CL 98 101 103   CO2 31.0 30.0 31.0     Recent Labs   Lab 25  1740 25  0750 25  0614   RBC 4.61 4.33 4.18   HGB 13.1 12.1* 11.8*   HCT 38.5* 35.9* 35.0*   MCV 83.5 82.9 83.7   MCH 28.4 27.9 28.2   MCHC 34.0 33.7 33.7   RDW 16.0 16.1 16.3   NEPRELIM 9.48*  --  7.68   WBC 12.4* 11.6* 10.2   .0 371.0 355.0         Recent Labs   Lab 25  1740   TROPHS 23       Diagnostics:   Telemetry: sinus bradycardia/NSR    Review of Systems   Cardiovascular:  Negative for chest pain.    Respiratory:  Negative for shortness of breath.        Physical Exam:  General: Alert and oriented in no apparent distress. elderly  HEENT: Pupils equal. Mucous membranes moist.   Neck: No JVD, flat  Cardiac:  Normal S1 S2, Regular. 1/6 systolic murmur  Lungs: Clear without wheezes or crackles    Abdomen: Soft, non-tender, ND  Extremities: Without clubbing, cyanosis or edema.    Neurologic: No focal deficits. Normal affect.  Skin: Warm and dry,    Medications:  Scheduled Medications[1]  Medication Infusions[2]    Assessment:    Fall, possible syncope - hypotensive on admission, possibly orthostasis vs mechanical fall   Acute hypoxic respiratory failure  1 L NC without baseline oxygen requirements. Suspect due to CHF and pneumonia. Completed IV antibiotics.  Acute on chronic biventricular HFrEF, mild-mod aortic stenosis  Decompensated on presentation. 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 No plans for aggressive approach to CAD given goals of care/advanced age.  GDMT: Could consider low dose ARB in outpatient setting, not starting at this time due to concerns of hypotension on presentation. No BB sinus bradycardia.  Appears euvolemic today on clinical exam. Down -7.4 L this admission. IV bumex 2 mg this morning. Transition to PO diuretics tomorrow  CAD prior PCI - ASA, statin  History of CVA  Paroxysmal atrial fibrillation - Maintaining NSR on amiodarone. MCT 3/2025 with 0% AF burden. Not on AC due to elevated bleed risk with falls.   HLD- statin    Plan:    Appears clinically euvolemic, still on 1 L NC with improving hypoxia. RN will attempt to wean to room air today. IV bumex 2 mg this morning. Will transition to PO bumex tomorrow. He was previously on PO bumex PRN, will schedule PO bumex 1 mg daily.   Ok for discharge from cardiac standpoint later this afternoon if weaned to room  air. Otherwise will monitor overnight with plan for home O2 evaluation tomorrow.    Plan of care discussed with patient, RN, Dr Oden.    ALICE Ewing  6/7/2025  9:24 AM         [1]    [Held by provider] bumetanide  1 mg Oral Daily    polyethylene glycol (PEG 3350)  17 g Oral Daily    docusate sodium  100 mg Oral BID    amiodarone  200 mg Oral QPM    aspirin  81 mg Oral Daily    pantoprazole  40 mg Oral BID AC    rosuvastatin  10 mg Oral Daily    tamsulosin  0.4 mg Oral QPM    enoxaparin  40 mg Subcutaneous Daily   [2]

## 2025-06-07 NOTE — PLAN OF CARE
Received patient on NC 2L, weaned to 1L, saturating well. VSS. Medications administered per the MAR and patient needs addressed. Patient is A  &O x3, continent and up with assist and walker. Updated on plan of care.

## 2025-06-08 VITALS
HEART RATE: 50 BPM | RESPIRATION RATE: 19 BRPM | SYSTOLIC BLOOD PRESSURE: 116 MMHG | WEIGHT: 127.19 LBS | OXYGEN SATURATION: 91 % | BODY MASS INDEX: 20.44 KG/M2 | DIASTOLIC BLOOD PRESSURE: 70 MMHG | HEIGHT: 66 IN | TEMPERATURE: 98 F

## 2025-06-08 LAB
ANION GAP SERPL CALC-SCNC: 7 MMOL/L (ref 0–18)
BUN BLD-MCNC: 26 MG/DL (ref 9–23)
CALCIUM BLD-MCNC: 8.6 MG/DL (ref 8.7–10.6)
CHLORIDE SERPL-SCNC: 102 MMOL/L (ref 98–112)
CO2 SERPL-SCNC: 30 MMOL/L (ref 21–32)
CREAT BLD-MCNC: 1.32 MG/DL (ref 0.7–1.3)
EGFRCR SERPLBLD CKD-EPI 2021: 49 ML/MIN/1.73M2 (ref 60–?)
GLUCOSE BLD-MCNC: 109 MG/DL (ref 70–99)
OSMOLALITY SERPL CALC.SUM OF ELEC: 293 MOSM/KG (ref 275–295)
POTASSIUM SERPL-SCNC: 3.6 MMOL/L (ref 3.5–5.1)
SODIUM SERPL-SCNC: 139 MMOL/L (ref 136–145)

## 2025-06-08 PROCEDURE — 99239 HOSP IP/OBS DSCHRG MGMT >30: CPT | Performed by: HOSPITALIST

## 2025-06-08 RX ORDER — BUMETANIDE 1 MG/1
1 TABLET ORAL DAILY
Status: DISCONTINUED | OUTPATIENT
Start: 2025-06-09 | End: 2025-06-08

## 2025-06-08 RX ORDER — AMIODARONE HYDROCHLORIDE 200 MG/1
200 TABLET ORAL DAILY
Qty: 90 TABLET | Refills: 1 | Status: SHIPPED | OUTPATIENT
Start: 2025-06-08

## 2025-06-08 RX ORDER — POTASSIUM CHLORIDE 750 MG/1
10 TABLET, EXTENDED RELEASE ORAL DAILY
Qty: 30 TABLET | Refills: 1 | Status: SHIPPED | OUTPATIENT
Start: 2025-06-08

## 2025-06-08 RX ORDER — BUMETANIDE 1 MG/1
1 TABLET ORAL DAILY
Qty: 30 TABLET | Refills: 1 | Status: SHIPPED | OUTPATIENT
Start: 2025-06-09

## 2025-06-08 NOTE — PROGRESS NOTES
[facility]   part of [facility]     Hospitalist Progress Note     Beba Mijares Patient Status:  Inpatient    1926 MRN [id number]   Location [facility] 5NW-A Attending Trevor Oden MD   Hosp Day # 7 PCP CLARK HIGGINS     Chief Complaint: Syncope    Subjective:   Patient feels well. On room air. No complaints.     Current medications:   [START ON 2025] bumetanide  1 mg Oral Daily    polyethylene glycol (PEG 3350)  17 g Oral Daily    docusate sodium  100 mg Oral BID    amiodarone  200 mg Oral QPM    aspirin  81 mg Oral Daily    pantoprazole  40 mg Oral BID AC    rosuvastatin  10 mg Oral Daily    tamsulosin  0.4 mg Oral QPM    enoxaparin  40 mg Subcutaneous Daily       Objective:    Review of Systems:   10 point ROS completed and was negative, except for pertinent positive and negatives stated in subjective.    Vital signs:  Temp:  [97.3 °F (36.3 °C)-98.9 °F (37.2 °C)] 97.9 °F (36.6 °C)  Pulse:  [50-61] 56  Resp:  [17-19] 19  BP: (106-130)/(50-67) 118/56  SpO2:  [90 %-96 %] 91 %  Patient Weight for the past 72 hrs:   Weight   25 1733 140 lb (63.5 kg)   25 2133 150 lb 8 oz (68.3 kg)   25 2141 150 lb (68 kg)     Physical Exam:    General: No acute distress.   Respiratory: Diminished, clear   Cardiovascular: S1, S2.   Abdomen: Soft, nontender, nondistended.  Positive bowel sounds.  Extremities: no edema   Neuro: Alert and awake    Diagnostic Data:    Labs:  Recent Labs   Lab 25  17425  0750 25  0614   WBC 12.4* 11.6* 10.2   HGB 13.1 12.1* 11.8*   MCV 83.5 82.9 83.7   .0 371.0 355.0   INR 1.25*  --   --        Recent Labs   Lab 25  1740 25  0750 25  0614 25  0621 25  0644 25  0605   * 121*   < > 108* 102* 109*   BUN 16 12   < > 28* 30* 26*   CREATSERUM 0.72 0.70   < > 1.28 1.22 1.32*   CA 8.7 8.5*   < > 8.8 8.8 8.6*   ALB 3.6 3.5  --   --   --   --    * 134*   < > 144 143 139   K 4.7 4.7   < > 3.6   3.6 4.9 3.6   CL 96* 100   < > 103 103 102   CO2 28.0 23.0   < > 31.0 30.0 30.0   ALKPHO 87 82  --   --   --   --    AST 30 28  --   --   --   --    ALT 30 29  --   --   --   --    BILT 0.6 0.6  --   --   --   --    TP 6.5 6.2  --   --   --   --     < > = values in this interval not displayed.       Estimated Creatinine Clearance: 25.5 mL/min (A) (based on SCr of 1.32 mg/dL (H)).    Recent Labs   Lab 06/01/25  1740   PTP 15.8*   INR 1.25*            COVID-19 Lab Results    COVID-19  Lab Results   Component Value Date    COVID19 Not Detected 06/01/2025    COVID19 Detected (A) 02/18/2025       Pro-Calcitonin  Recent Labs   Lab 06/01/25  1740   PCT 0.13*       Cardiac  Recent Labs   Lab 06/05/25  0620   PBNP 5,266*       Creatinine Kinase  No results for input(s): \"CK\" in the last 168 hours.    Inflammatory Markers  No results for input(s): \"CRP\", \"TOSHA\", \"LDH\", \"DDIMER\" in the last 168 hours.    Recent Labs   Lab 06/01/25  1740   TROPHS 23       Imaging: Imaging data reviewed in Epic.    Medications:    [START ON 6/9/2025] bumetanide  1 mg Oral Daily    polyethylene glycol (PEG 3350)  17 g Oral Daily    docusate sodium  100 mg Oral BID    amiodarone  200 mg Oral QPM    aspirin  81 mg Oral Daily    pantoprazole  40 mg Oral BID AC    rosuvastatin  10 mg Oral Daily    tamsulosin  0.4 mg Oral QPM    enoxaparin  40 mg Subcutaneous Daily       Assessment & Plan:    Syncope, CTb neg, concern for arrhythmia given depressed EF, EF now 25-30%  Aortic stenosis, mild-moderate   Telemetry   Acute hypoxic respiratory failure d/t possible pneumonia though suspect more acute on chronic heart failure, RVP neg, PCT  IV abx - completed  Diuretics below  Oxygen - on room air   Incentive spirometry  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    Plan of care discussed with patient and RN.    Trevor Oden MD        Supplementary Documentation:   DVT Mechanical Prophylaxis:   SCDs, Early ambuation  DVT Pharmacologic Prophylaxis   Medication    enoxaparin (Lovenox) 40 MG/0.4ML SUBQ injection 40 mg      DVT Pharmacologic prophylaxis: Aspirin 162 mg         Code Status: DNAR/Comfort Care  Jennings: No urinary catheter in place  Jennings Duration (in days):   Central line:    ELIN: 6/8/2025

## 2025-06-08 NOTE — DISCHARGE SUMMARY
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      Patient is deaf so history was obtained from multiple sources. Patient struggling with a cough x 3-4 days. He experienced a fall, possibly syncope today morning at 5 AM with head trauma. Later in the day he was experiencing SOB so family called paramedics.      Patient was afebrile, hypotensive to 80s systolic in the ED. HE was requiring 4 L NC. CT brain was negative. CXR with diffuse bilateral pulmonary infiltrates with more focal consolidation/atelectasis retrocardiac left lung base. Bilateral pleural effusions. He was given albuterol neb, IV antibiotics, 1 L NS bolus and admitted.     Brief Synopsis: Patient presented to the ER with syncope and shortness of breath. He was admitted for pneumonia. Patient re-evaluated  and felt to have acute heart failure rather than pneumonia. Diuretics initiated with cardiology consult. Patient with positive response. Home on regimen outlined below.    Lace+ Score: 83  59-90 High Risk  29-58 Medium Risk  0-28   Low Risk       TCM Follow-Up Recommendation:  LACE > 58: High Risk of readmission after discharge from the hospital.        Discharge Medication List:     Discharge Medications        START taking these medications        Instructions Prescription details   potassium chloride 10 MEQ Tbcr  Commonly known as: Klor-Con M10      Take 1 tablet (10 mEq total) by mouth daily.   Quantity: 30 tablet  Refills: 1            CHANGE how you take these medications        Instructions Prescription details   amiodarone 200 MG Tabs  Commonly known as: Pacerone  What changed: See the new instructions.      Take 1 tablet (200 mg total) by mouth daily.   Quantity: 90 tablet  Refills: 1     bumetanide 1 MG Tabs  Commonly known as: Bumex  What changed:   medication strength  how much to take      Take 1 tablet (1 mg total) by mouth daily.   Quantity: 30 tablet  Refills: 1            CONTINUE taking these medications        Instructions Prescription details   aspirin 81 MG Chew      Chew 1 tablet (81 mg total) by mouth in the morning.   Refills: 0     multivitamin Tabs      Take 1 tablet by mouth at bedtime.   Refills: 0     Nitrostat 0.4 MG Subl  Generic drug: nitroglycerin       Refills: 0     pantoprazole 40 MG Tbec  Commonly known as: Protonix      Take 1 tablet (40 mg total) by mouth in the morning and 1 tablet (40 mg total) in the evening. Take before meals.   Refills: 0     rosuvastatin 10 MG Tabs  Commonly known as: Crestor      Take 1 tablet (10 mg total) by mouth in the morning.   Refills: 0     tamsulosin 0.4 MG Caps  Commonly known as: Flomax       Refills: 0            STOP taking these medications      apixaban 2.5 MG Tabs  Commonly known as: Eliquis        isosorbide mononitrate ER 30 MG  Tb24  Commonly known as: Imdur        lisinopril 10 MG Tabs  Commonly known as: Zestril        POTASSIUM CHLORIDE OR                  Where to Get Your Medications        These medications were sent to [id number] - [place], [place] - [address] AT [facility] [address]  & , [phone], [phone] [address], [facility] [address]      Phone: [phone]   amiodarone 200 MG Tabs  bumetanide 1 MG Tabs  potassium chloride 10 MEQ Tbcr         [place]    Follow-up appointment:   Dusty García MD  [address] [facility] [address]  [facility] [address] [phone]    Follow up in 1 week(s)  Our office will call to schedule appointment    Appointments for Next 30 Days 2025 - 2025      None                -----------------------------------------------------------------------------------------------  PATIENT DISCHARGE INSTRUCTIONS: See electronic chart    Trevor Oden MD    Total time spent on discharge plannin minutes     The  [employer] Cures Act makes medical notes like these available to patients in the interest of transparency. Please be advised this is a medical document. Medical documents are intended to carry relevant information, facts as evident, and the clinical opinion of the practitioner. The medical note is intended as peer to peer communication and may appear blunt or direct. It is written in medical language and may contain abbreviations or verbiage that are unfamiliar.

## 2025-06-08 NOTE — CM/SW NOTE
06/08/25 1400   Discharge disposition   Expected discharge disposition Home-Health   Post Acute Care Provider   (Purpose [facility])   Discharge transportation Private car     Noted discharge order entered.  Confirmed w/RN that he is cleared to go and she is attempting to contact daughter.  Sent updates and message to [facility] to notify them of discharge today    / to remain available for support and/or discharge planning.     Daphney Kendall MBA MSN, RN Case Manager  [phone]

## 2025-06-08 NOTE — PROGRESS NOTES
Received pt A&Ox3 forgetful.  on RA. NSR/SB on tele. Lovenox for VTE prophylaxis. Tolerating diet. Meds per MAR. Voids via urinal. Daily weight. Strict I's & o's. Up 1 walker. Denies Pain. Plan of care continues, no further needs at this time.

## 2025-06-08 NOTE — PROGRESS NOTES
Progress Note  Beba Mijares Patient Status:  Inpatient    1926 MRN [id number]   Location [facility] 5NW-A Attending Trevor Oden MD   Hosp Day # 7 PCP CLARK HIGGINS     Subjective:  He is resting comfortably in bed on room air. He denies shortness of breath. Feeling good with ambulation. Denies chest discomfort.     Objective:  /67 (BP Location: Right arm)   Pulse 55   Temp 98.9 °F (37.2 °C) (Oral)   Resp 18   Ht 5' 6\" (1.676 m)   Wt 127 lb 3.2 oz (57.7 kg)   SpO2 90%   BMI 20.53 kg/m²     Intake/Output:    Intake/Output Summary (Last 24 hours) at 2025 0717  Last data filed at 2025 0205  Gross per 24 hour   Intake 320 ml   Output 800 ml   Net -480 ml       Last 3 Weights   25 0346 127 lb 3.2 oz (57.7 kg)   25 0513 129 lb 11.2 oz (58.8 kg)   25 0500 129 lb 6.4 oz (58.7 kg)   25 0813 138 lb 10.7 oz (62.9 kg)   25 0640 145 lb 4.5 oz (65.9 kg)   25 2141 150 lb (68 kg)   25 2133 150 lb 8 oz (68.3 kg)   25 1733 140 lb (63.5 kg)   25 0644 140 lb (63.5 kg)   25 0442 140 lb 3.2 oz (63.6 kg)   02/15/25 1850 100 lb (45.4 kg)   24 0221 142 lb 3.2 oz (64.5 kg)   24 2218 140 lb (63.5 kg)       Labs:  Recent Labs   Lab 25  0621 25  0644 25  0605   * 102* 109*   BUN 28* 30* 26*   CREATSERUM 1.28 1.22 1.32*   EGFRCR 51* 54* 49*   CA 8.8 8.8 8.6*    143 139   K 3.6  3.6 4.9 3.6    103 102   CO2 31.0 30.0 30.0     Recent Labs   Lab 25  1740 25  0750 25  0614   RBC 4.61 4.33 4.18   HGB 13.1 12.1* 11.8*   HCT 38.5* 35.9* 35.0*   MCV 83.5 82.9 83.7   MCH 28.4 27.9 28.2   MCHC 34.0 33.7 33.7   RDW 16.0 16.1 16.3   NEPRELIM 9.48*  --  7.68   WBC 12.4* 11.6* 10.2   .0 371.0 355.0         Recent Labs   Lab 25  1740   TROPHS 23       Diagnostics:   Telemetry: sinus bradycardia/NSR    Review of Systems   Cardiovascular:  Negative for chest pain.   Respiratory:   Negative for shortness of breath.        Physical Exam:  General: Alert and oriented in no apparent distress. elderly  HEENT: Pupils equal. Mucous membranes moist.   Neck: No JVD, flat  Cardiac:  Normal S1 S2, Regular. 1/6 systolic murmur  Lungs: Clear without wheezes or crackles    Abdomen: Soft, non-tender, ND  Extremities: Without clubbing, cyanosis or edema.    Neurologic: No focal deficits. Normal affect.  Skin: Warm and dry,    Medications:  Scheduled Medications[1]  Medication Infusions[2]    Assessment:    Fall, possible syncope - hypotensive on admission, possibly orthostasis vs mechanical fall   Acute hypoxic respiratory failure, resolved  Back on room air. Suspect due to CHF and pneumonia. Completed IV antibiotics and IV diuresis.   Acute on chronic biventricular HFrEF, mild-mod aortic stenosis  Decompensated on presentation. 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 No plans for aggressive approach to CAD given goals of care/advanced age.  GDMT: Could consider low dose ARB in outpatient setting, not starting at this time due to concerns of hypotension on presentation. No BB sinus bradycardia.  Appears euvolemic today on clinical exam. Down -7.9 L this admission. Labs pre-renal pattern, appear mildly intravascularly dry. Transition to PO diuretics tomorrow  CAD prior PCI - ASA, statin  History of CVA  Paroxysmal atrial fibrillation - Maintaining NSR on amiodarone. MCT 3/2025 with 0% AF burden. Not on AC due to elevated bleed risk with falls.   HLD- statin    Plan:    He is resting comfortably on room air. Appears euvolemic on exam, mildly intravascularly dry on labs but stable. Discontinue further IV diuresis. Transition to PO bumex 1 mg daily and will start oral diuretics tomorrow.   Ok for discharge from cardiac standpoint. Will plan on BMP in 1 week with outpatient  follow-up in 1 week.     Plan of care discussed with patient, RN    ALICE Ewing  6/8/2025  7:18 AM         [1]    bumetanide  1 mg Oral Daily    polyethylene glycol (PEG 3350)  17 g Oral Daily    docusate sodium  100 mg Oral BID    amiodarone  200 mg Oral QPM    aspirin  81 mg Oral Daily    pantoprazole  40 mg Oral BID AC    rosuvastatin  10 mg Oral Daily    tamsulosin  0.4 mg Oral QPM    enoxaparin  40 mg Subcutaneous Daily   [2]

## 2025-06-08 NOTE — PLAN OF CARE
Received patient on room air saturating well. VSS. Medications administered per the MAR and patient needs addressed. Patient is A &O x3, continent and up with assist and walker. Updated on plan of care.

## 2025-06-16 ENCOUNTER — HOSPITAL ENCOUNTER (EMERGENCY)
Facility: HOSPITAL | Age: OVER 89
Discharge: HOME | End: 2025-06-16
Attending: EMERGENCY MEDICINE
Payer: MEDICARE

## 2025-06-16 ENCOUNTER — APPOINTMENT (OUTPATIENT)
Dept: GENERAL RADIOLOGY | Facility: HOSPITAL | Age: OVER 89
End: 2025-06-16
Attending: EMERGENCY MEDICINE
Payer: MEDICARE

## 2025-06-16 ENCOUNTER — APPOINTMENT (OUTPATIENT)
Dept: CT IMAGING | Facility: HOSPITAL | Age: OVER 89
End: 2025-06-16
Attending: EMERGENCY MEDICINE
Payer: MEDICARE

## 2025-06-16 VITALS
DIASTOLIC BLOOD PRESSURE: 63 MMHG | HEART RATE: 55 BPM | SYSTOLIC BLOOD PRESSURE: 99 MMHG | RESPIRATION RATE: 21 BRPM | OXYGEN SATURATION: 99 % | TEMPERATURE: 98 F

## 2025-06-16 DIAGNOSIS — M12.811 ROTATOR CUFF ARTHROPATHY OF RIGHT SHOULDER: Primary | ICD-10-CM

## 2025-06-16 DIAGNOSIS — K80.20 CALCULUS OF GALLBLADDER WITHOUT CHOLECYSTITIS WITHOUT OBSTRUCTION: ICD-10-CM

## 2025-06-16 LAB
ALBUMIN SERPL-MCNC: 4.1 G/DL (ref 3.2–4.8)
ALBUMIN/GLOB SERPL: 1.2 {RATIO} (ref 1–2)
ALP LIVER SERPL-CCNC: 103 U/L (ref 45–117)
ALT SERPL-CCNC: 19 U/L (ref 10–49)
ANION GAP SERPL CALC-SCNC: 10 MMOL/L (ref 0–18)
AST SERPL-CCNC: 26 U/L (ref ?–34)
BASOPHILS # BLD AUTO: 0.04 X10(3) UL (ref 0–0.2)
BASOPHILS NFR BLD AUTO: 0.4 %
BILIRUB SERPL-MCNC: 0.4 MG/DL (ref 0.2–0.9)
BILIRUB UR QL STRIP.AUTO: NEGATIVE
BUN BLD-MCNC: 16 MG/DL (ref 9–23)
CALCIUM BLD-MCNC: 9.2 MG/DL (ref 8.7–10.6)
CHLORIDE SERPL-SCNC: 101 MMOL/L (ref 98–112)
CLARITY UR REFRACT.AUTO: CLEAR
CO2 SERPL-SCNC: 28 MMOL/L (ref 21–32)
COLOR UR AUTO: COLORLESS
CREAT BLD-MCNC: 1.31 MG/DL (ref 0.7–1.3)
EGFRCR SERPLBLD CKD-EPI 2021: 49 ML/MIN/1.73M2 (ref 60–?)
EOSINOPHIL # BLD AUTO: 0.06 X10(3) UL (ref 0–0.7)
EOSINOPHIL NFR BLD AUTO: 0.6 %
ERYTHROCYTE [DISTWIDTH] IN BLOOD BY AUTOMATED COUNT: 16.5 %
GLOBULIN PLAS-MCNC: 3.3 G/DL (ref 2–3.5)
GLUCOSE BLD-MCNC: 163 MG/DL (ref 70–99)
GLUCOSE UR STRIP.AUTO-MCNC: NORMAL MG/DL
HCT VFR BLD AUTO: 42.1 % (ref 39–53)
HGB BLD-MCNC: 13.4 G/DL (ref 13–17.5)
IMM GRANULOCYTES # BLD AUTO: 0.04 X10(3) UL (ref 0–1)
IMM GRANULOCYTES NFR BLD: 0.4 %
KETONES UR STRIP.AUTO-MCNC: NEGATIVE MG/DL
LEUKOCYTE ESTERASE UR QL STRIP.AUTO: NEGATIVE
LIPASE SERPL-CCNC: 46 U/L (ref 12–53)
LYMPHOCYTES # BLD AUTO: 1.52 X10(3) UL (ref 1–4)
LYMPHOCYTES NFR BLD AUTO: 14.9 %
MCH RBC QN AUTO: 27.5 PG (ref 26–34)
MCHC RBC AUTO-ENTMCNC: 31.8 G/DL (ref 31–37)
MCV RBC AUTO: 86.3 FL (ref 80–100)
MONOCYTES # BLD AUTO: 0.98 X10(3) UL (ref 0.1–1)
MONOCYTES NFR BLD AUTO: 9.6 %
NEUTROPHILS # BLD AUTO: 7.56 X10 (3) UL (ref 1.5–7.7)
NEUTROPHILS # BLD AUTO: 7.56 X10(3) UL (ref 1.5–7.7)
NEUTROPHILS NFR BLD AUTO: 74.1 %
NITRITE UR QL STRIP.AUTO: NEGATIVE
OSMOLALITY SERPL CALC.SUM OF ELEC: 293 MOSM/KG (ref 275–295)
PH UR STRIP.AUTO: 6 [PH] (ref 5–8)
PLATELET # BLD AUTO: 263 10(3)UL (ref 150–450)
PLATELETS.RETICULATED NFR BLD AUTO: 3.3 % (ref 0–7)
POTASSIUM SERPL-SCNC: 4.4 MMOL/L (ref 3.5–5.1)
PROT SERPL-MCNC: 7.4 G/DL (ref 5.7–8.2)
PROT UR STRIP.AUTO-MCNC: NEGATIVE MG/DL
RBC # BLD AUTO: 4.88 X10(6)UL (ref 3.8–5.8)
RBC UR QL AUTO: NEGATIVE
SODIUM SERPL-SCNC: 139 MMOL/L (ref 136–145)
SP GR UR STRIP.AUTO: 1.01 (ref 1–1.03)
TROPONIN I SERPL HS-MCNC: 45 NG/L (ref ?–53)
UROBILINOGEN UR STRIP.AUTO-MCNC: NORMAL MG/DL
WBC # BLD AUTO: 10.2 X10(3) UL (ref 4–11)

## 2025-06-16 PROCEDURE — 36415 COLL VENOUS BLD VENIPUNCTURE: CPT

## 2025-06-16 PROCEDURE — 84484 ASSAY OF TROPONIN QUANT: CPT | Performed by: EMERGENCY MEDICINE

## 2025-06-16 PROCEDURE — 81003 URINALYSIS AUTO W/O SCOPE: CPT | Performed by: EMERGENCY MEDICINE

## 2025-06-16 PROCEDURE — 71045 X-RAY EXAM CHEST 1 VIEW: CPT | Performed by: EMERGENCY MEDICINE

## 2025-06-16 PROCEDURE — 83690 ASSAY OF LIPASE: CPT | Performed by: EMERGENCY MEDICINE

## 2025-06-16 PROCEDURE — 85025 COMPLETE CBC W/AUTO DIFF WBC: CPT | Performed by: EMERGENCY MEDICINE

## 2025-06-16 PROCEDURE — 80053 COMPREHEN METABOLIC PANEL: CPT | Performed by: EMERGENCY MEDICINE

## 2025-06-16 PROCEDURE — 99285 EMERGENCY DEPT VISIT HI MDM: CPT

## 2025-06-16 PROCEDURE — 73030 X-RAY EXAM OF SHOULDER: CPT | Performed by: EMERGENCY MEDICINE

## 2025-06-16 PROCEDURE — 74176 CT ABD & PELVIS W/O CONTRAST: CPT | Performed by: EMERGENCY MEDICINE

## 2025-06-16 PROCEDURE — 93010 ELECTROCARDIOGRAM REPORT: CPT

## 2025-06-16 PROCEDURE — 93005 ELECTROCARDIOGRAM TRACING: CPT

## 2025-06-16 RX ORDER — LIDOCAINE 50 MG/G
1 PATCH TOPICAL EVERY 24 HOURS
Qty: 5 EACH | Refills: 0 | Status: SHIPPED | OUTPATIENT
Start: 2025-06-16 | End: 2025-06-21

## 2025-06-16 NOTE — ED INITIAL ASSESSMENT (HPI)
Pt presents to ER with pain to right arm and shoulder area for 3 days. Pt also states that his abdominal area is tender to the touch.  Pt denies injury to region. Pt denies fever, n/v/d.

## 2025-06-16 NOTE — ED PROVIDER NOTES
Patient Seen in: [facility] Emergency Department        History  Chief Complaint   Patient presents with    Abdomen/Flank Pain    Arm Injury     Stated Complaint:     Subjective:   HPI            Patient is a 89-year-old male with a history of coronary disease, among other medical problems outlined below, who is a difficult historian presents with multiple complaints.  First reports right shoulder pain for about 3 days.  Is worse when he lifts his shoulder or moves in a certain way.  No definitive trauma or injury.  Denies any radiation of this pain.  No paresthesias weakness.  Denies any chest pain shortness of breath.  No neck pain.  He had reported to EMS with right flank pain.  Was tender but but denies any tenderness now.  No vomiting diarrhea.  No urinary symptoms.  No other complaints.    Objective:     Past Medical History:    Atherosclerosis of coronary artery    Stents 3    BLOOD CLOTS    Colitis    Deaf    Heart attack (HCC)    NSTEMI    High blood pressure    High cholesterol    Kidney stone    Pulmonary embolism (HCC)    DVT & PE POst  Fractured Metatarsil    Stroke (HCC)              Past Surgical History:   Procedure Laterality Date    Angiogram      Angioplasty (coronary)  9/21/16      Stents LAD, CIRC, RCA    Cath cartotid stent      Cath drug eluting stent      Prostate biopsies                  Social History     Socioeconomic History    Marital status:    Tobacco Use    Smoking status: Former     Types: Cigarettes   Vaping Use    Vaping status: Never Used   Substance and Sexual Activity    Alcohol use: Not Currently     Comment: occ    Drug use: No     Social Drivers of Health     Food Insecurity: No Food Insecurity (6/1/2025)    NCSS - Food Insecurity     Worried About Running Out of Food in the Last Year: No     Ran Out of Food in the Last Year: No   Transportation Needs: No Transportation Needs (6/1/2025)    NCSS - Transportation     Lack of Transportation: No   Housing  Stability: Not At Risk (6/1/2025)    NCSS - Housing/Utilities     Has Housing: Yes     Worried About Losing Housing: No     Unable to Get Utilities: No                                Physical Exam    ED Triage Vitals [06/16/25 0758]   /69   Pulse 63   Resp 20   Temp 98.3 °F (36.8 °C)   Temp src Temporal   SpO2 100 %   O2 Device None (Room air)       Current Vitals:   Vital Signs  BP: 103/69  Pulse: 63  Resp: 20  Temp: 98.3 °F (36.8 °C)  Temp src: Temporal    Oxygen Therapy  SpO2: 100 %  O2 Device: None (Room air)            Physical Exam  Vitals and nursing note reviewed.   Constitutional:       General: He is not in acute distress.     Appearance: He is well-developed. He is not toxic-appearing.   HENT:      Head: Normocephalic and atraumatic.   Eyes:      General: No scleral icterus.     Conjunctiva/sclera: Conjunctivae normal.   Cardiovascular:      Rate and Rhythm: Normal rate.   Pulmonary:      Effort: Pulmonary effort is normal. No respiratory distress.   Abdominal:      General: There is no distension.      Tenderness: There is no abdominal tenderness. There is no guarding or rebound.   Musculoskeletal:         General: No tenderness. Normal range of motion.      Cervical back: Normal range of motion and neck supple.   Skin:     General: Skin is warm and dry.      Findings: No rash.   Neurological:      Mental Status: He is alert and oriented to person, place, and time.      Motor: No abnormal muscle tone.      Coordination: Coordination normal.   Psychiatric:         Behavior: Behavior normal.       Pain with passive range of motion of the right shoulder.  There is no deformity.  Right elbow forearm wrist and hand unremarkable.  Patient neurovascularly intact.        ED Course  Labs Reviewed   COMP METABOLIC PANEL (14) - Abnormal; Notable for the following components:       Result Value    Glucose 163 (*)     Creatinine 1.31 (*)     eGFR-Cr 49 (*)     All other components within normal limits    URINALYSIS WITH CULTURE REFLEX - Abnormal; Notable for the following components:    Urine Color Colorless (*)     Bacteria Urine Rare (*)     All other components within normal limits   TROPONIN I HIGH SENSITIVITY - Normal   LIPASE - Normal   CBC WITH DIFFERENTIAL WITH PLATELET   SCAN SLIDE     EKG    Rate, intervals and axes as noted on EKG Report.  Rate: 60  Rhythm: Sinus Rhythm  Reading: Normal sinus rhythm, bifascicular block.                                 MDM             -History source other than patient -EMS and daughter        -Comorbidities did add complexity to the management are mentioned in the HPI above        -I personally reviewed the prior external notes and the medical record to obtain additional history -I reviewed patient's admission here to the hospital was discharged on June 8 for syncope aortic stenosis and pneumonia causing hypoxic respiratory failure        -DDX: Includes but not limited to musculoskeletal pain related to rotator cuff, anginal equivalent, acute abdomen which is a medical condition that can pose a threat to life/function        -I personally reviewed the radiographs findings and they show rotator cuff arthropathy  Please refer to radiology report for official interpretation        -I personally reviewed the CT findings and it shows cholelithiasis  Please refer to radiology report for official interpretation    Labs Reviewed   COMP METABOLIC PANEL (14) - Abnormal; Notable for the following components:       Result Value    Glucose 163 (*)     Creatinine 1.31 (*)     eGFR-Cr 49 (*)     All other components within normal limits   URINALYSIS WITH CULTURE REFLEX - Abnormal; Notable for the following components:    Urine Color Colorless (*)     Bacteria Urine Rare (*)     All other components within normal limits   TROPONIN I HIGH SENSITIVITY - Normal   LIPASE - Normal   CBC WITH DIFFERENTIAL WITH PLATELET   SCAN SLIDE     CT ABDOMEN+PELVIS KIDNEYSTONE 2D RNDR(NO IV,NO  ORAL)(CPT=74176)   Final Result   PROCEDURE:  CT ABDOMEN+PELVIS KIDNEYSTONE 2D RNDR(NO IV,NO    ORAL)(CPT=74176)       COMPARISON:  [facility], CT, CT ABDOMEN+PELVIS(CONTRAST ONLY)(CPT=74177),    2/15/2025, 8:43 PM.       INDICATIONS:  flank pain       TECHNIQUE:  Unenhanced multislice CT scanning from above the kidneys to    below the urinary bladder.  2D rendering are generated on the CT scanner    workstation to localize potential stones in the cranio-caudal plane.  Dose    reduction techniques were used.    Dose information is transmitted to the ACR (American College of Radiology)    NRDR (National Radiology Data Registry) which includes the Dose Index    Registry.       PATIENT STATED HISTORY: (As transcribed by Technologist)  Patient is here    for right flank pain.             FINDINGS:     KIDNEYS:  No hydronephrosis or obstructive nephrolithiasis.  One-2 mm    nonobstructing calculus in the right kidney.   BLADDER:  No mass, calculus or significant wall thickening.    ADRENALS:  No mass or enlargement.     LIVER:  1.2 cm left hepatic lobe cyst.  No steatosis.   BILIARY:  Cholelithiasis.  No significant biliary ductal dilation.   PANCREAS:  No lesion, fluid collection, ductal dilatation, or atrophy.     SPLEEN:  No enlargement or focal lesion.     AORTA/VASCULAR:  Atherosclerotic disease without aneurysm   RETROPERITONEUM:  No mass or adenopathy.     BOWEL/MESENTERY:  Moderate hiatal hernia.  No dilated bowel.  Colonic    diverticulosis, without diverticulitis.  The appendix is not visualized.   ABDOMINAL WALL:  Bilateral fat containing inguinal hernia.   BONES:  Degenerative changes of the spine.  Grade 1 anterolisthesis of L4    on L5.  3 mm retrolisthesis of L2 on L3.   PELVIC ORGANS:  Enlarged prostate.   LUNG BASES:  Bibasilar interstitial changes and small right pleural    effusion.  Evaluation is degraded by motion artifact.   OTHER:  Negative.                           =====   CONCLUSION:     1. No  hydronephrosis or obstructing nephrolithiasis.   2. Enlarged prostate.   3. Colonic diverticulosis.   4. Cholelithiasis.   5. Bibasilar pulmonary interstitial opacities and small right pleural    effusion, correlate clinically for possible infection/inflammation.     Evaluation is degraded by motion artifact.               LOCATION:  [facility]           Dictated by (CST): Nicholas Lemus MD on 6/16/2025 at 10:14 AM        Finalized by (CST): Nicholas Lemus MD on 6/16/2025 at 10:18 AM         XR SHOULDER, COMPLETE (MIN 2 VIEWS), RIGHT (CPT=73030)   Final Result   PROCEDURE:  XR SHOULDER, COMPLETE (MIN 2 VIEWS), RIGHT (CPT=73030)       TECHNIQUE:  Multiple views were obtained.       COMPARISON:  [facility] , XR, XR SHOULDER, COMPLETE (MIN 2 VIEWS), RIGHT    (CPT=73030), 12/04/2023, 6:08 PM.       INDICATIONS:  pain       PATIENT STATED HISTORY: (As transcribed by Technologist)  Patient offered    no additional history at this time.            FINDINGS:  No acute fracture or dislocation.  There are degenerative    changes within the glenohumeral joint and acromioclavicular joints.  There    is narrowing of the acromial humeral interval, which can be seen in    setting of chronic rotator cuff    arthropathy, correlate clinically.  No radiopaque foreign body.                         =====   CONCLUSION:  See above           LOCATION:  [facility]           Dictated by (CST): Nicholas Lemus MD on 6/16/2025 at 8:53 AM        Finalized by (CST): Nicholas Lemus MD on 6/16/2025 at 8:54 AM         XR CHEST AP PORTABLE  (CPT=71045)   Final Result   PROCEDURE:  XR CHEST AP PORTABLE  (CPT=71045)       TECHNIQUE:  AP chest radiograph was obtained.       COMPARISON:  [facility] , XR, XR CHEST PA + LAT CHEST (CPT=71046), 6/05/2025,    4:57 PM.  [facility] , XR, XR CHEST AP PORTABLE  (CPT=71045), 6/01/2025, 7:24    PM.       INDICATIONS:  pain       PATIENT STATED HISTORY: (As transcribed by Technologist)  Patient offered    no additional history at this  time.            FINDINGS:  Continued improvement, without complete resolution, in    bilateral interstitial opacities which could reflect improving edema    versus infectious/inflammatory process.  No new focal consolidation.  No    significant pleural effusion.  No measurable    pneumothorax.  Cardiomediastinal silhouette is within normal limits.     Aortic calcifications are present.                         =====   CONCLUSION:  See above           LOCATION:  [id number]                       Dictated by (CST): Nicholas Lemus MD on 6/16/2025 at 8:52 AM        Finalized by (CST): Nicholas Lemus MD on 6/16/2025 at 8:53 AM           The pain in right shoulder is reproduced with abduction he has trouble raising over his head.  Suspect rotator cuff pathology.  EKG troponin for any acute and troponin negative for any signs of acute cardiac injury.  Nothing to suggest anginal equivalent especially as the pain has been going on for about 4 days.     No acute findings on CT abdomen.  Discussed findings with the patient and daughter.  Shared decision making utilized and will be discharged with outpatient follow-up.        Medical Decision Making      Disposition and Plan     Clinical Impression:  1. Rotator cuff arthropathy of right shoulder    2. Calculus of gallbladder without cholecystitis without obstruction         Disposition:  Discharge  6/16/2025 10:40 am    Follow-up:  Ashish Mills  [address] [facility]  [address] [place] [id number] [phone]    Follow up in 2 day(s)            Medications Prescribed:  Current Discharge Medication List        START taking these medications    Details   lidocaine 5 % External Patch Place 1 patch onto the skin daily for 5 days.  Qty: 5 each, Refills: 0                   Supplementary Documentation:

## 2025-06-17 LAB
ATRIAL RATE: 60 BPM
P AXIS: -28 DEGREES
P-R INTERVAL: 176 MS
Q-T INTERVAL: 482 MS
QRS DURATION: 150 MS
QTC CALCULATION (BEZET): 482 MS
R AXIS: 157 DEGREES
T AXIS: 21 DEGREES
VENTRICULAR RATE: 60 BPM

## (undated) DEVICE — BITEBLOCK ENDOSCP 60FR MAXI STRP

## (undated) DEVICE — KIT CUSTOM ENDOPROCEDURE STERIS

## (undated) DEVICE — Device

## (undated) DEVICE — SIX SHOOTER SAEED MULTI-BAND LIGATOR: Brand: SAEED

## (undated) DEVICE — V2 SPECIMEN COLLECTION MANIFOLD KIT: Brand: NEPTUNE

## (undated) DEVICE — 10FT COMBINED O2 DELIVERY/CO2 MONITORING. FILTER WITH MICROSTREAM TYPE LUER: Brand: DUAL ADULT NASAL CANNULA

## (undated) DEVICE — DEVICE SPEC RTRVL TLN 2.5MM

## (undated) DEVICE — GRASPER ENDOSCP L230CM SHTH DIA2.4MM REG DEV

## (undated) DEVICE — 3M™ RED DOT™ MONITORING ELECTRODE WITH FOAM TAPE AND STICKY GEL 2570-3, 3/BAG, 200/CASE, 54/PLT: Brand: RED DOT™

## (undated) DEVICE — 1200CC GUARDIAN II: Brand: GUARDIAN

## (undated) DEVICE — KIT VLV 5 PC AIR H2O SUCT BX ENDOGATOR CONN

## (undated) DEVICE — GIJAW SINGLE-USE BIOPSY FORCEPS WITH NEEDLE: Brand: GIJAW

## (undated) NOTE — LETTER
[address]  [address]  Authorization for Surgical Operation and Procedure     Date:___________                                                                                                         Time:__________  I hereby authorize Surgeon(s):  Yobani Corral MD, my physician and his/her assistants (if applicable), which may include medical students, residents, and/or fellows, to perform the following surgical operation/ procedure and administer such anesthesia as may be determined necessary by my physician:  Operation/Procedure name (s) Procedure(s):  ESOPHAGOGASTRODUODENOSCOPY (EGD) on Beba Mijares   2.   I recognize that during the surgical operation/procedure, unforeseen conditions may necessitate additional or different procedures than those listed above.  I, therefore, further authorize and request that the above-named surgeon, assistants, or designees perform such procedures as are, in their judgment, necessary and desirable.    3.   My surgeon/physician has discussed prior to my surgery the potential benefits, risks and side effects of this procedure; the likelihood of achieving goals; and potential problems that might occur during recuperation.  They also discussed reasonable alternatives to the procedure, including risks, benefits, and side effects related to the alternatives and risks related to not receiving this procedure.  I have had all my questions answered and I acknowledge that no guarantee has been made as to the result that may be obtained.    4.   Should the need arise during my operation/procedure, which includes change of level of care prior to discharge, I also consent to the administration of blood and/or blood products.  Further, I understand that despite careful testing and screening of blood or blood products by collecting agencies, I may still be subject to ill effects as a result of receiving a blood transfusion and/or blood products.  The  following are some, but not all, of the potential risks that can occur: fever and allergic reactions, hemolytic reactions, transmission of diseases such as Hepatitis, AIDS and Cytomegalovirus (CMV) and fluid overload.  In the event that I wish to have an autologous transfusion of my own blood, or a directed donor transfusion, I will discuss this with my physician.  Check only if Refusing Blood or Blood Products  I understand refusal of blood or blood products as deemed necessary by my physician may have serious consequences to my condition to include possible death. I hereby assume responsibility for my refusal and release the hospital, its personnel, and my physicians from any responsibility for the consequences of my refusal.          o  Refuse      5.   I authorize the use of any specimen, organs, tissues, body parts or foreign objects that may be removed from my body during the operation/procedure for diagnosis, research or teaching purposes and their subsequent disposal by hospital authorities.  I also authorize the release of specimen test results and/or written reports to my treating physician on the hospital medical staff or other referring or consulting physicians involved in my care, at the discretion of the Pathologist or my treating physician.    6.   I consent to the photographing or videotaping of the operations or procedures to be performed, including appropriate portions of my body for medical, scientific, or educational purposes, provided my identity is not revealed by the pictures or by descriptive texts accompanying them.  If the procedure has been photographed/videotaped, the surgeon will obtain the original picture, image, videotape or CD.  The hospital will not be responsible for storage, release or maintenance of the picture, image, tape or CD.    7.   I consent to the presence of a  or observers in the operating room as deemed necessary by my physician or their designees.     8.   I recognize that in the event my procedure results in extended X-Ray/fluoroscopy time, I may develop a skin reaction.    9. If I have a Do Not Attempt Resuscitation (DNAR) order in place, that status will be suspended while in the operating room, procedural suite, and during the recovery period unless otherwise explicitly stated by me (or a person authorized to consent on my behalf). The surgeon or my attending physician will determine when the applicable recovery period ends for purposes of reinstating the DNAR order.  10. Patients having a sterilization procedure: I understand that if the procedure is successful the results will be permanent and it will therefore be impossible for me to inseminate, conceive, or bear children.  I also understand that the procedure is intended to result in sterility, although the result has not been guaranteed.   11. I acknowledge that my physician has explained sedation/analgesia administration to me including the risk and benefits I consent to the administration of sedation/analgesia as may be necessary or desirable in the judgment of my physician.    I CERTIFY THAT I HAVE READ AND FULLY UNDERSTAND THE ABOVE CONSENT TO OPERATION and/or OTHER PROCEDURE.    _________________________________________  __________________________________  Signature of Patient     Signature of Responsible Person         ___________________________________         Printed Name of Responsible Person           _________________________________                 Relationship to Patient  _________________________________________  ______________________________  Signature of Witness          Date  Time      Patient Name: Beba Mijares     : 1926                 Printed: 2025     Medical Record #: [id number]                     Page [address] of [address] [place] [address]  [id number]    Consent for Anesthesia    I, Beba Mijares agree  to be cared for by an anesthesiologist, who is specially trained to monitor me and give me medicine to put me to sleep or keep me comfortable during my procedure    I understand that my anesthesiologist is not an employee or agent of [facility] or [employer]. He or she works for [employer].    As the patient asking for anesthesia services, I agree to:  Allow the anesthesiologist (anesthesia doctor) to give me medicine and do additional procedures as necessary. Some examples are: Starting or using an “IV” to give me medicine, fluids or blood during my procedure, and having a breathing tube placed to help me breathe when I’m asleep (intubation). In the event that my heart stops working properly, I understand that my anesthesiologist will make every effort to sustain my life, unless otherwise directed by [facility] Do Not Resuscitate documents.  Tell my anesthesia doctor before my procedure:  If I am pregnant.  The last time that I ate or drank.  All of the medicines I take (including prescriptions, herbal supplements, and pills I can buy without a prescription (including street drugs/illegal medications). Failure to inform my anesthesiologist about these medicines may increase my risk of anesthetic complications.  If I am allergic to anything or have had a reaction to anesthesia before.  I understand how the anesthesia medicine will help me (benefits).  I understand that with any type of anesthesia medicine there are risks:  The most common risks are: nausea, vomiting, sore throat, muscle soreness, damage to my eyes, mouth, or teeth (from breathing tube placement).  Rare risks include: remembering what happened during my procedure, allergic reactions to medications, injury to my airway, heart, lungs, vision, nerves, or muscles and in extremely rare instances death.  My doctor has explained to me other choices available to me for my care (alternatives).  Pregnant Patients  (“epidural”):  I understand that the risks of having an epidural (medicine given into my back to help control pain during labor), include itching, low blood pressure, difficulty urinating, headache or slowing of the baby’s heart. Very rare risks include infection, bleeding, seizure, irregular heart rhythms and nerve injury.  Regional Anesthesia (“spinal”, “epidural”, & “nerve blocks”):  I understand that rare but potential complications include headache, bleeding, infection, seizure, irregular heart rhythms, and nerve injury.    I can change my mind about having anesthesia services at any time before I get the medicine.    _____________________________________________________________________________  Patient (or Representative) Signature/Relationship to Patient  Date   Time    _____________________________________________________________________________   Name (if used)    Language/Organization   Time    _____________________________________________________________________________  Anesthesiologist Signature     Date   Time  I have discussed the procedure and information above with the patient (or patient’s representative) and answered their questions. The patient or their representative has agreed to have anesthesia services.    _____________________________________________________________________________  Witness        Date   Time  I have verified that the signature is that of the patient or patient’s representative, and that it was signed before the procedure  Patient Name: Beba Mijares     : 1926                 Printed: 2025     Medical Record #: [id number]                     Page 2 of 2

## (undated) NOTE — LETTER
Patient Name: Beba Mijares  YOB: 1926          MRN number:  [id number]  Date:  4/23/2024  Referring Physician:  Ashish Mills       INITIAL EVALUATION   Referring Physician: Dr. Mills  Diagnosis: Unsteady gait (R26.81)  Recurrent falls (R29.6)     Date of Service: 4/23/2024     PATIENT SUMMARY/ASSESSMENT   Beba Mijares is a 89 year old y/o male who presents to therapy today with complaints of recent falls and poor balance. He reports he had a fall last year in November/December and had another fall approximately 1 month ago. He also has chronic shoulder pain and was being treated in physical therapy for his shoulder pain last month. Treatment was placed on hold as the patient had the fall noted above at home   There are 1 personal factors and co-morbidities influencing plan of care, including chronic nature of balance difficulties, making treatment more difficult  There are 3 or more Body Structures/Functions, Activity Limitations and Participation Restrictions, including poor balance, decreased LE flexibility, difficulty walking long distances  Clinical Presentation: Evolving   Beba describes prior level of function as independent with ambulation without an assistive device and independent with ADLs and self-care tasks. Pt goals include improving his balance.  Past medical history was reviewed with Beba. Significant findings include  has a past medical history of Atherosclerosis of coronary artery (9/21/16 ), BLOOD CLOTS, Colitis, Deaf, Heart attack (HCC) (9/18/16), High blood pressure, High cholesterol, Kidney stone, Pulmonary embolism (HCC) (2013), and Stroke ([facility]).  Beba would benefit from skilled Physical Therapy to address the above impairments to decrease complaints of shoulder pain and improve balance/gait      Positive History of Falls: yes   FES Score  Score: 73.44 % (4/23/2024  9:55 AM)    Score and level of concern: 47 - high concern (4/23/2024  9:55 AM)  Fall Risk:  yes      Precautions:  Fall Risk    OBJECTIVE:   Physical Exam:  Posture/Observation: Slightly forward flexed posture in standing    LE Strength: Hip flexors 4+/5, Quadriceps 5/5, Hamstrings 5/5, Ankle dorsiflexors 4/5, Ankle invertors 5/5, Ankle evertors 4/5  LE Flexibility: Moderate limitations in bilateral hamstrings               Postural Control:  4-Stage Balance Test:   - Feet together: >30 sec with increased sway   - Modified Tandem:  >15 sec with increased sway    - Tandem Stance: Unable sec Fall Risk: yes   - SLS: R 1 sec, L 1 sec Fall Risk: yes  [Full tandem stance <10 sec indicates increased risk of falling]  Age appropriate norms for SLS: 60-70 y/o mean = 27.0 sec      70-80 y/o mean = 17.2 sec      80-98 y/o mean = 8.5 sec     ROM: Seated AROM right shoulder flexion 80 degrees, Abduction 70 degrees with scapular elevation, Functional IR to L5, Functional ER to ear.     Functional Mobility:  30 sec sit<>stand: 11   Fall Risk: no  [Below average score indicates high risk for falls; norms by age > or = to...   60-65 y/o Men: 14, Women: 12   65-70 y/o Men: 12, Women: 11   70-75 y/o Men: 12, Women: 10   75-80 y/o Men: 11, Women: 10   80-83 y/o Men: 10, Women: 9   85-88 y/o Men: 8, Women: 8   90-93 y/o Men 7, Women: 4]    Gait The patient ambulates with a slightly wide ROBERT, decreased step length and decreased heel strike and push off     TUG (AD, time): 18 sec    Fall Risk: yes  [Performance exceeding the upper limit of confidence intervals are considered increased risk for falls; Lester, 2006...   60-70 y/o mean 8.1s (7.1-9.0s)   70-80 y/o mean 9.2s (8.2-10.2s)   80-98 y/o mean 11.3s (10.0-12.7s)]    4 Item Dynamic Gait Index Score: 6/12 Fall Risk: yes  [Scores of 10 or less indicate increased risk for falls]  Gait level surface: 1  Grading: Art the lowest category that applies.  (3)  Normal: Walks 20’, no assistive devices, good speed, no evidence of imbalance, normal gait pattern.  (2)  Mild Impairment:  Walks 20’, uses assistive devices, slower speed, mild gait deviations.  (1)  Moderate Impairment: Walks 20’, slow speed, abnormal gait pattern, evidence of imbalance.  (0)  Severe Impairment: Cannot walk 20’ without assistance, severe gait deviations or imbalance.     Change in gait speed: 1  Grading: Art the lowest category that applies.  (3)  Normal: Able to smoothly change walking speed without loss of balance or gait deviation. Shows a significant difference in walking speed between normal, fast and slow speeds.   (2)  Mild Impairment: Is able to change speed but demonstrates mild gait deviations, or no gait deviations but unable to achieve a significant change in velocity, or uses an assistive device.   (1)  Moderate Impairment: Makes only minor adjustments to walking speed, or accomplishes a change in speed with significant gait deviations, or changes speed but has significant gait deviations, or changes speed but loses balance but is able to recover and continue walking.  (0)  Severe Impairment: Cannot change speeds, or loses balance and has to reach for wall or be caught.    Gait with horizontal head turns: 2  (3)  Normal: Performs head turns smoothly with no change in gait.  (2)  Mild Impairment: Preforms head turns smoothly with slight change in gait velocity, i.e., minor disruption to smooth gait path or uses walking aid.  (1) Moderate Impairment: Preforms head turns with moderate change in gait velocity, slows down, staggers but recovers, can continue to walk.  (0)  Severe Impairment: Preform task with severe disruption of gait, i.e., staggers outside 15” path, loses balance, stops, reaches for wall.    Gait with vertical head turns: 2  Grading: Art the lowest category that applies.  (3) Normal: Preforms head turns smoothly with no change in gait.  (2) Mild Impairment: Preforms head turns smoothly with slight change in gait velocity, i.e., minor disruption to smooth gait path or uses walking  aid.  (1) Moderate Impairment: Preforms head turns with moderate change in gait velocity, slows down, staggers but recovers, can continue to walk.  (0) Severe Impairment: Preforms task with severe disruption of gait, i.e., staggers outside 15” path, loses balance, stops, reaches for wall.    Today's treatment: Reviewed plan of care and role of physical therapy.  : PT Eval Moderate complexity x1, TherEx x 1      Total Timed Treatment: 10 min     Total Treatment Time: 35 min     PLAN OF CARE:    Goals: ( To be met in 12 visits)  Pt will demonstrate improved SLS to >5 seconds JAYLYN to promote safety and decrease risk of falls on uneven surfaces such as grass   Pt will perform TUG in <12 seconds with least restrictive AD, demonstrating improved gait speed for improved participation in ADL such as community ambulation  Pt will perform 4-Item DGI with score of 10/12 or greater with least restrictive AD to demonstrate ability to ambulate safely in crowded and busy environments   Pt will demonstrate improved right shoulder flexion AROM to be greater than 120 degrees   Pt will be independent and compliant with comprehensive HEP to maintain progress achieved in PT    Frequency / Duration: Patient will be seen for 2 x/week or a total of 12 visits over a 90 day period. Treatment will include: Neuromuscular Re-education; Gait Training; Therapeutic Exercises; Manual Therapy;     Education or treatment limitation: None  Rehab Potential:good    Patient/Family/Caregiver was advised of these findings, precautions, and treatment options and has agreed to actively participate in planning and for this course of care.    Thank you for your referral. Please co-sign or sign and return this letter via fax as soon as possible to [phone]. If you have any questions, please contact me at Dept: [phone]    Sincerely,  Electronically signed by therapist: Jose Juan Lee, PT    Physician's certification required: Yes  I certify the need for  these services furnished under this plan of treatment and while under my care.    X___________________________________________________ Date____________________    Certification From: 4/23/2024  To:7/22/2024 21st Century Cures Act Notice to Patient: Medical documents like this are made available to patients in the interest of transparency. However, be advised this is a medical document and it is intended as gkrq-lg-rpec communication between your medical providers. This medical document may contain abbreviations, assessments, medical data, and results or other terms that are unfamiliar. Medical documents are intended to carry relevant information, facts as evident, and the clinical opinion of the practitioner. As such, this medical document may be written in language that appears blunt or direct. You are encouraged to contact your medical provider and/or [facility] Patient Experience if you have any questions about this medical document.

## (undated) NOTE — LETTER
Patient Name: Beba Mijares  YOB: 1926          MRN number:  [id number]  Date:  3/5/2024  Referring Physician:  Ashish Mills     INITIAL EVALUATION:   Referring Physician: Dr. Mills  Diagnosis: Chronic right shoulder pain (M25.511,G89.29)     Date of Service: 3/4/2024     PATIENT SUMMARY/ASSESSMENT   Beba Mijares is a 89 year old y/o male who presents to therapy today with complaints of right shoulder pain and decreased ROM. The patient reports initial onset of symptoms several months ago with no known mechanism of injury. HE reports his shoulder pain has progressively worsened and he had x-rays which showed some arthritis and possible rotator cuff injury    Pain: Present 5/10  Best 3/10  Worst 7/10  The patient reports pain in the right shoulder/upper arm. He reports his symptoms are aggravated with using the right arm more. He reports his symptoms are eased with resting    Beba Mijares presents with impaired joint mobility, motor function, muscle performance, and range of motion associated with right shoulder pain  There are 1 personal factors and co-morbidities influencing plan of care, including chronic nature of symptoms, making treatment more difficult  There are 3 or more Body Structures/Functions, Activity Limitations and Participation Restrictions, including poor posture, decreased shoulder AROM, decreased shoulder strength  Clinical Presentation: Evolving   Beba describes prior level of function as independent with ADLs and self-care tasks without significant limitations or complaints of shoulder pain. Pt goals include decreasing his complaints of shoulder pain.  Past medical history was reviewed with Beba. Significant findings include  has a past medical history of Atherosclerosis of coronary artery (9/21/16 ), BLOOD CLOTS, Colitis, Deaf, Heart attack (HCC) (9/18/16), High blood pressure, High cholesterol, Kidney stone, Pulmonary embolism (HCC) (2013), and Stroke ([facility]).  Beba would  benefit from skilled Physical Therapy to address the above impairments to decrease complaints of pain and facilitate return to previous level of function    Precautions:  None  OBJECTIVE:   Observation/Posture: The patient sits with poor posture with a slightly forward head and rounded shoulders    ROM: Seated AROM right shoulder flexion 80 degrees, Abduction 70 degrees with scapular elevation, Functional IR to L5, Functional ER to ear.   Supine PROM right shoulder flexion 120 degrees, ER 70 degrees at 90 degrees of abduction, IR 50 degrees at 90 degrees of abduction    Accessory motion: Hypomobility with GH joint AP/inferior glides    Flexibility: Moderate limitations in bilateral pectorals    Special tests: Drop Arm test painful    QuickDASH Outcome Score  Score: 38.64 % (3/4/2024  2:17 PM)       Today’s Treatment and Response: Reviewed plan of care and role of physical therapy. Performed supine PROM right shoulder as tolerated, Seated OH pulleys, supine wand shoulder flexion and provided handout for HEP and recommended the patient obtain shoulder pulleys for home    Charges: PT Eval Moderate complexity x1, TherEx x 1      Total Timed Treatment: 10 min     Total Treatment Time: 40 min     PLAN OF CARE:    Goals:  (To be met in 12 visits)  Patient to demonstrate independence and compliance with comprehensive home exercise program   Patient to demonstrate improved postural awareness, requiring no cueing during treatment session   Patient to improve QuickDash score to be better than 15%  Patient to demonstrate improved right shoulder AROM to be at least 120 degrees of flexion to facilitate performance of overhead tasks  Patient to report decreased complaints of pain at its worst to be less than or equal to 3/10 to facilitate performance of ADLs and self-care tasks    Frequency / Duration: Patient will be seen for 2 x/week or a total of 12 visits over a 90 day period. Treatment will include: Manual Therapy;  Therapeutic Exercises; Neuromuscular Re-education; Therapeutic Activity; Electrical Stim; Ultrasound;     Education or treatment limitation: None  Rehab Potential:fair      Patient/Family/Caregiver was advised of these findings, precautions, and treatment options and has agreed to actively participate in planning and for this course of care.    Thank you for your referral. Please co-sign or sign and return this letter via fax as soon as possible to [phone]. If you have any questions, please contact me at Dept: [phone]    Sincerely,  Electronically signed by therapist: Jose Juan Lee PT    Physician's certification required: Yes  I certify the need for these services furnished under this plan of treatment and while under my care.    X___________________________________________________ Date____________________    Certification From: 3/4/2024  To:6/2/2024 21st Century Cures Act Notice to Patient: Medical documents like this are made available to patients in the interest of transparency. However, be advised this is a medical document and it is intended as uagy-lv-kqum communication between your medical providers. This medical document may contain abbreviations, assessments, medical data, and results or other terms that are unfamiliar. Medical documents are intended to carry relevant information, facts as evident, and the clinical opinion of the practitioner. As such, this medical document may be written in language that appears blunt or direct. You are encouraged to contact your medical provider and/or [facility] Patient Experience if you have any questions about this medical document.